# Patient Record
Sex: FEMALE | Race: WHITE | NOT HISPANIC OR LATINO
[De-identification: names, ages, dates, MRNs, and addresses within clinical notes are randomized per-mention and may not be internally consistent; named-entity substitution may affect disease eponyms.]

---

## 2017-02-10 ENCOUNTER — APPOINTMENT (OUTPATIENT)
Dept: PULMONOLOGY | Facility: CLINIC | Age: 77
End: 2017-02-10

## 2017-02-10 VITALS
DIASTOLIC BLOOD PRESSURE: 70 MMHG | HEART RATE: 52 BPM | SYSTOLIC BLOOD PRESSURE: 122 MMHG | WEIGHT: 207 LBS | HEIGHT: 59 IN | OXYGEN SATURATION: 94 % | BODY MASS INDEX: 41.73 KG/M2

## 2017-04-17 ENCOUNTER — APPOINTMENT (OUTPATIENT)
Dept: INTERNAL MEDICINE | Facility: CLINIC | Age: 77
End: 2017-04-17

## 2017-04-17 VITALS
RESPIRATION RATE: 14 BRPM | HEART RATE: 62 BPM | OXYGEN SATURATION: 95 % | SYSTOLIC BLOOD PRESSURE: 136 MMHG | DIASTOLIC BLOOD PRESSURE: 72 MMHG | WEIGHT: 212 LBS | TEMPERATURE: 98.2 F | HEIGHT: 59 IN | BODY MASS INDEX: 42.74 KG/M2

## 2017-04-17 DIAGNOSIS — Z83.1 FAMILY HISTORY OF OTHER INFECTIOUS AND PARASITIC DISEASES: ICD-10-CM

## 2017-04-17 DIAGNOSIS — Z82.0 FAMILY HISTORY OF EPILEPSY AND OTHER DISEASES OF THE NERVOUS SYSTEM: ICD-10-CM

## 2017-04-17 DIAGNOSIS — Z86.79 PERSONAL HISTORY OF OTHER DISEASES OF THE CIRCULATORY SYSTEM: ICD-10-CM

## 2017-04-17 DIAGNOSIS — Z87.891 PERSONAL HISTORY OF NICOTINE DEPENDENCE: ICD-10-CM

## 2017-04-18 LAB
25(OH)D3 SERPL-MCNC: 22.9 NG/ML
ALBUMIN SERPL ELPH-MCNC: 3.7 G/DL
ALP BLD-CCNC: 64 U/L
ALT SERPL-CCNC: 8 U/L
ANION GAP SERPL CALC-SCNC: 20 MMOL/L
AST SERPL-CCNC: 12 U/L
BILIRUB SERPL-MCNC: 0.4 MG/DL
BUN SERPL-MCNC: 15 MG/DL
CALCIUM SERPL-MCNC: 9.3 MG/DL
CHLORIDE SERPL-SCNC: 95 MMOL/L
CHOLEST SERPL-MCNC: 170 MG/DL
CHOLEST/HDLC SERPL: 2.9 RATIO
CO2 SERPL-SCNC: 25 MMOL/L
CREAT SERPL-MCNC: 0.69 MG/DL
FERRITIN SERPL-MCNC: 143.1 NG/ML
FOLATE SERPL-MCNC: 18.1 NG/ML
GLUCOSE SERPL-MCNC: 68 MG/DL
HBV SURFACE AG SER QL: NONREACTIVE
HCV AB SER QL: NONREACTIVE
HCV S/CO RATIO: 0.15 S/CO
HDLC SERPL-MCNC: 59 MG/DL
LDLC SERPL CALC-MCNC: 95 MG/DL
MAGNESIUM SERPL-MCNC: 2.1 MG/DL
POTASSIUM SERPL-SCNC: 4.4 MMOL/L
PROT SERPL-MCNC: 7.4 G/DL
SODIUM SERPL-SCNC: 140 MMOL/L
TRIGL SERPL-MCNC: 78 MG/DL
TSH SERPL-ACNC: 1.58 UIU/ML
URATE SERPL-MCNC: 4.2 MG/DL
VIT B12 SERPL-MCNC: 372 PG/ML

## 2017-04-19 LAB
APPEARANCE: ABNORMAL
BACTERIA: NEGATIVE
BILIRUBIN URINE: NEGATIVE
BLOOD URINE: NEGATIVE
CALCIUM OXALATE CRYSTALS: ABNORMAL
COLOR: YELLOW
CREAT SPEC-SCNC: 62 MG/DL
GLUCOSE QUALITATIVE U: NORMAL MG/DL
HBA1C MFR BLD HPLC: 5.7 %
HYALINE CASTS: 0 /LPF
KETONES URINE: NEGATIVE
LEUKOCYTE ESTERASE URINE: NEGATIVE
MICROALBUMIN 24H UR DL<=1MG/L-MCNC: 0.9 MG/DL
MICROALBUMIN/CREAT 24H UR-RTO: 14 UG/MG
MICROSCOPIC-UA: NORMAL
NITRITE URINE: NEGATIVE
PH URINE: 6
PROTEIN URINE: NEGATIVE MG/DL
RED BLOOD CELLS URINE: 1 /HPF
SPECIFIC GRAVITY URINE: 1.01
SQUAMOUS EPITHELIAL CELLS: 1 /HPF
UROBILINOGEN URINE: NORMAL MG/DL
WHITE BLOOD CELLS URINE: 3 /HPF

## 2017-04-20 LAB
BASOPHILS # BLD AUTO: 0.01 K/UL
BASOPHILS NFR BLD AUTO: 0.2 %
EOSINOPHIL # BLD AUTO: 0.13 K/UL
EOSINOPHIL NFR BLD AUTO: 2.5 %
HCT VFR BLD CALC: 33.5 %
HGB BLD-MCNC: 10.1 G/DL
IMM GRANULOCYTES NFR BLD AUTO: 0.2 %
LYMPHOCYTES # BLD AUTO: 1.29 K/UL
LYMPHOCYTES NFR BLD AUTO: 25.1 %
MAN DIFF?: NORMAL
MCHC RBC-ENTMCNC: 27.5 PG
MCHC RBC-ENTMCNC: 30.1 GM/DL
MCV RBC AUTO: 91.3 FL
MONOCYTES # BLD AUTO: 0.44 K/UL
MONOCYTES NFR BLD AUTO: 8.6 %
NEUTROPHILS # BLD AUTO: 3.25 K/UL
NEUTROPHILS NFR BLD AUTO: 63.4 %
PLATELET # BLD AUTO: 127 K/UL
RBC # BLD: 3.67 M/UL
RBC # FLD: 17.1 %
WBC # FLD AUTO: 5.13 K/UL

## 2017-05-08 ENCOUNTER — APPOINTMENT (OUTPATIENT)
Dept: PULMONOLOGY | Facility: CLINIC | Age: 77
End: 2017-05-08

## 2017-05-08 VITALS
OXYGEN SATURATION: 95 % | WEIGHT: 210 LBS | DIASTOLIC BLOOD PRESSURE: 68 MMHG | SYSTOLIC BLOOD PRESSURE: 134 MMHG | HEIGHT: 59 IN | BODY MASS INDEX: 42.33 KG/M2 | HEART RATE: 57 BPM | RESPIRATION RATE: 22 BRPM

## 2017-05-25 ENCOUNTER — OUTPATIENT (OUTPATIENT)
Dept: OUTPATIENT SERVICES | Facility: HOSPITAL | Age: 77
LOS: 1 days | Discharge: HOME | End: 2017-05-25

## 2017-05-25 DIAGNOSIS — D64.9 ANEMIA, UNSPECIFIED: ICD-10-CM

## 2017-05-25 DIAGNOSIS — Z98.89 OTHER SPECIFIED POSTPROCEDURAL STATES: Chronic | ICD-10-CM

## 2017-05-25 DIAGNOSIS — Z90.710 ACQUIRED ABSENCE OF BOTH CERVIX AND UTERUS: Chronic | ICD-10-CM

## 2017-05-30 ENCOUNTER — RESULT REVIEW (OUTPATIENT)
Age: 77
End: 2017-05-30

## 2017-05-30 ENCOUNTER — APPOINTMENT (OUTPATIENT)
Dept: HEMATOLOGY ONCOLOGY | Facility: CLINIC | Age: 77
End: 2017-05-30

## 2017-05-30 VITALS
OXYGEN SATURATION: 97 % | TEMPERATURE: 98 F | HEART RATE: 60 BPM | DIASTOLIC BLOOD PRESSURE: 94 MMHG | HEIGHT: 59.02 IN | SYSTOLIC BLOOD PRESSURE: 179 MMHG | RESPIRATION RATE: 16 BRPM | WEIGHT: 219.36 LBS | BODY MASS INDEX: 44.22 KG/M2

## 2017-05-30 DIAGNOSIS — D69.6 THROMBOCYTOPENIA, UNSPECIFIED: ICD-10-CM

## 2017-05-30 DIAGNOSIS — D64.9 ANEMIA, UNSPECIFIED: ICD-10-CM

## 2017-05-30 DIAGNOSIS — R31.9 HEMATURIA, UNSPECIFIED: ICD-10-CM

## 2017-05-30 DIAGNOSIS — R30.9 PAINFUL MICTURITION, UNSPECIFIED: ICD-10-CM

## 2017-05-30 LAB
APPEARANCE: CLEAR
BACTERIA: ABNORMAL
BASOPHILS # BLD AUTO: 0 K/UL — SIGNIFICANT CHANGE UP (ref 0–0.2)
BASOPHILS NFR BLD AUTO: 0.8 % — SIGNIFICANT CHANGE UP (ref 0–2)
BILIRUBIN URINE: NEGATIVE
BLOOD URINE: ABNORMAL
COLOR: YELLOW
EOSINOPHIL # BLD AUTO: 0.2 K/UL — SIGNIFICANT CHANGE UP (ref 0–0.5)
EOSINOPHIL NFR BLD AUTO: 2.9 % — SIGNIFICANT CHANGE UP (ref 0–6)
GLUCOSE QUALITATIVE U: NORMAL MG/DL
HCT VFR BLD CALC: 32.5 % — LOW (ref 34.5–45)
HGB BLD-MCNC: 10.9 G/DL — LOW (ref 11.5–15.5)
HYALINE CASTS: 5 /LPF
KETONES URINE: NEGATIVE
LEUKOCYTE ESTERASE URINE: ABNORMAL
LYMPHOCYTES # BLD AUTO: 0.9 K/UL — LOW (ref 1–3.3)
LYMPHOCYTES # BLD AUTO: 16.4 % — SIGNIFICANT CHANGE UP (ref 13–44)
MCHC RBC-ENTMCNC: 29.9 PG — SIGNIFICANT CHANGE UP (ref 27–34)
MCHC RBC-ENTMCNC: 33.4 G/DL — SIGNIFICANT CHANGE UP (ref 32–36)
MCV RBC AUTO: 89.5 FL — SIGNIFICANT CHANGE UP (ref 80–100)
MICROSCOPIC-UA: NORMAL
MONOCYTES # BLD AUTO: 0.6 K/UL — SIGNIFICANT CHANGE UP (ref 0–0.9)
MONOCYTES NFR BLD AUTO: 10.2 % — SIGNIFICANT CHANGE UP (ref 2–14)
NEUTROPHILS # BLD AUTO: 3.8 K/UL — SIGNIFICANT CHANGE UP (ref 1.8–7.4)
NEUTROPHILS NFR BLD AUTO: 69.8 % — SIGNIFICANT CHANGE UP (ref 43–77)
NITRITE URINE: NEGATIVE
PH URINE: 7
PLATELET # BLD AUTO: 113 K/UL — LOW (ref 150–400)
PROTEIN URINE: NEGATIVE MG/DL
RBC # BLD: 3.63 M/UL — LOW (ref 3.8–5.2)
RBC # FLD: 13.6 % — SIGNIFICANT CHANGE UP (ref 10.3–14.5)
RED BLOOD CELLS URINE: 2 /HPF
SPECIFIC GRAVITY URINE: 1.01
SQUAMOUS EPITHELIAL CELLS: 1 /HPF
UROBILINOGEN URINE: 1 MG/DL
WBC # BLD: 5.4 K/UL — SIGNIFICANT CHANGE UP (ref 3.8–10.5)
WBC # FLD AUTO: 5.4 K/UL — SIGNIFICANT CHANGE UP (ref 3.8–10.5)
WHITE BLOOD CELLS URINE: 62 /HPF

## 2017-06-01 LAB — BACTERIA UR CULT: ABNORMAL

## 2017-06-21 ENCOUNTER — OUTPATIENT (OUTPATIENT)
Dept: OUTPATIENT SERVICES | Facility: HOSPITAL | Age: 77
LOS: 1 days | Discharge: HOME | End: 2017-06-21

## 2017-06-21 DIAGNOSIS — C64.9 MALIGNANT NEOPLASM OF UNSPECIFIED KIDNEY, EXCEPT RENAL PELVIS: ICD-10-CM

## 2017-06-21 DIAGNOSIS — Z90.710 ACQUIRED ABSENCE OF BOTH CERVIX AND UTERUS: Chronic | ICD-10-CM

## 2017-06-21 DIAGNOSIS — Z98.89 OTHER SPECIFIED POSTPROCEDURAL STATES: Chronic | ICD-10-CM

## 2017-06-22 ENCOUNTER — APPOINTMENT (OUTPATIENT)
Dept: INTERNAL MEDICINE | Facility: CLINIC | Age: 77
End: 2017-06-22

## 2017-06-22 ENCOUNTER — LABORATORY RESULT (OUTPATIENT)
Age: 77
End: 2017-06-22

## 2017-06-22 VITALS
TEMPERATURE: 98.6 F | DIASTOLIC BLOOD PRESSURE: 76 MMHG | HEART RATE: 60 BPM | WEIGHT: 217 LBS | RESPIRATION RATE: 12 BRPM | SYSTOLIC BLOOD PRESSURE: 134 MMHG | BODY MASS INDEX: 43.75 KG/M2 | HEIGHT: 59 IN | OXYGEN SATURATION: 95 %

## 2017-06-22 DIAGNOSIS — S09.90XA UNSPECIFIED INJURY OF HEAD, INITIAL ENCOUNTER: ICD-10-CM

## 2017-06-22 DIAGNOSIS — R53.83 OTHER FATIGUE: ICD-10-CM

## 2017-06-22 DIAGNOSIS — H81.10 BENIGN PAROXYSMAL VERTIGO, UNSPECIFIED EAR: ICD-10-CM

## 2017-06-22 RX ORDER — SACUBITRIL AND VALSARTAN 24; 26 MG/1; MG/1
24-26 TABLET, FILM COATED ORAL
Refills: 0 | Status: DISCONTINUED | COMMUNITY
End: 2017-06-22

## 2017-06-23 LAB
25(OH)D3 SERPL-MCNC: 32.8 NG/ML
ANION GAP SERPL CALC-SCNC: 14 MMOL/L
BASOPHILS # BLD AUTO: 0.02 K/UL
BASOPHILS NFR BLD AUTO: 0.5 %
BUN SERPL-MCNC: 10 MG/DL
CALCIUM SERPL-MCNC: 9.6 MG/DL
CHLORIDE SERPL-SCNC: 81 MMOL/L
CO2 SERPL-SCNC: 33 MMOL/L
CREAT SERPL-MCNC: 0.7 MG/DL
EOSINOPHIL # BLD AUTO: 0.08 K/UL
EOSINOPHIL NFR BLD AUTO: 1.9 %
FERRITIN SERPL-MCNC: 187 NG/ML
FOLATE SERPL-MCNC: >20 NG/ML
GLUCOSE SERPL-MCNC: 82 MG/DL
HCT VFR BLD CALC: 32.4 %
HGB BLD-MCNC: 10.4 G/DL
IMM GRANULOCYTES NFR BLD AUTO: 0.5 %
LYMPHOCYTES # BLD AUTO: 0.72 K/UL
LYMPHOCYTES NFR BLD AUTO: 17.1 %
MAGNESIUM SERPL-MCNC: 2.1 MG/DL
MAN DIFF?: NORMAL
MCHC RBC-ENTMCNC: 28.5 PG
MCHC RBC-ENTMCNC: 32.1 GM/DL
MCV RBC AUTO: 88.8 FL
MONOCYTES # BLD AUTO: 0.35 K/UL
MONOCYTES NFR BLD AUTO: 8.3 %
NEUTROPHILS # BLD AUTO: 3.01 K/UL
NEUTROPHILS NFR BLD AUTO: 71.7 %
PLATELET # BLD AUTO: NORMAL
POTASSIUM SERPL-SCNC: 5.6 MMOL/L
RBC # BLD: 3.65 M/UL
RBC # FLD: 14.9 %
SODIUM SERPL-SCNC: 128 MMOL/L
TSH SERPL-ACNC: 1.62 UIU/ML
VIT B12 SERPL-MCNC: 484 PG/ML
WBC # FLD AUTO: 4.2 K/UL

## 2017-06-27 ENCOUNTER — APPOINTMENT (OUTPATIENT)
Dept: HEMATOLOGY ONCOLOGY | Facility: CLINIC | Age: 77
End: 2017-06-27

## 2017-07-10 ENCOUNTER — APPOINTMENT (OUTPATIENT)
Dept: PULMONOLOGY | Facility: CLINIC | Age: 77
End: 2017-07-10

## 2017-07-10 VITALS
HEART RATE: 59 BPM | HEIGHT: 59 IN | SYSTOLIC BLOOD PRESSURE: 148 MMHG | TEMPERATURE: 98.8 F | RESPIRATION RATE: 14 BRPM | OXYGEN SATURATION: 96 % | BODY MASS INDEX: 40.32 KG/M2 | DIASTOLIC BLOOD PRESSURE: 60 MMHG | WEIGHT: 200 LBS

## 2017-08-16 ENCOUNTER — LABORATORY RESULT (OUTPATIENT)
Age: 77
End: 2017-08-16

## 2017-08-16 ENCOUNTER — APPOINTMENT (OUTPATIENT)
Dept: INTERNAL MEDICINE | Facility: CLINIC | Age: 77
End: 2017-08-16
Payer: MEDICARE

## 2017-08-16 VITALS
HEIGHT: 59 IN | HEART RATE: 54 BPM | WEIGHT: 204 LBS | RESPIRATION RATE: 13 BRPM | DIASTOLIC BLOOD PRESSURE: 67 MMHG | SYSTOLIC BLOOD PRESSURE: 122 MMHG | TEMPERATURE: 98.1 F | BODY MASS INDEX: 41.12 KG/M2

## 2017-08-16 DIAGNOSIS — I25.10 ATHEROSCLEROTIC HEART DISEASE OF NATIVE CORONARY ARTERY W/OUT ANGINA PECTORIS: ICD-10-CM

## 2017-08-16 DIAGNOSIS — E87.1 HYPO-OSMOLALITY AND HYPONATREMIA: ICD-10-CM

## 2017-08-16 PROCEDURE — 99214 OFFICE O/P EST MOD 30 MIN: CPT

## 2017-08-16 RX ORDER — MECLIZINE HYDROCHLORIDE 12.5 MG/1
12.5 TABLET ORAL
Qty: 15 | Refills: 3 | Status: DISCONTINUED | COMMUNITY
Start: 2017-06-22 | End: 2017-08-16

## 2017-08-17 LAB
ANION GAP SERPL CALC-SCNC: 13 MMOL/L
BASOPHILS # BLD AUTO: 0.05 K/UL
BASOPHILS NFR BLD AUTO: 1 %
BUN SERPL-MCNC: 16 MG/DL
CALCIUM SERPL-MCNC: 9.1 MG/DL
CHLORIDE SERPL-SCNC: 91 MMOL/L
CO2 SERPL-SCNC: 33 MMOL/L
CREAT SERPL-MCNC: 0.92 MG/DL
EOSINOPHIL # BLD AUTO: 0.11 K/UL
EOSINOPHIL NFR BLD AUTO: 2.3 %
FERRITIN SERPL-MCNC: 246 NG/ML
GLUCOSE SERPL-MCNC: 72 MG/DL
HCT VFR BLD CALC: 31.6 %
HGB BLD-MCNC: 9.6 G/DL
IMM GRANULOCYTES NFR BLD AUTO: 0.2 %
LYMPHOCYTES # BLD AUTO: 0.92 K/UL
LYMPHOCYTES NFR BLD AUTO: 19.1 %
MAGNESIUM SERPL-MCNC: 2.2 MG/DL
MAN DIFF?: NORMAL
MCHC RBC-ENTMCNC: 26.9 PG
MCHC RBC-ENTMCNC: 30.4 GM/DL
MCV RBC AUTO: 88.5 FL
MONOCYTES # BLD AUTO: 0.47 K/UL
MONOCYTES NFR BLD AUTO: 9.8 %
NEUTROPHILS # BLD AUTO: 3.25 K/UL
NEUTROPHILS NFR BLD AUTO: 67.6 %
PLATELET # BLD AUTO: 135 K/UL
POTASSIUM SERPL-SCNC: 5.2 MMOL/L
RBC # BLD: 3.57 M/UL
RBC # FLD: 15.3 %
SODIUM SERPL-SCNC: 137 MMOL/L
WBC # FLD AUTO: 4.81 K/UL

## 2017-09-15 ENCOUNTER — RX RENEWAL (OUTPATIENT)
Age: 77
End: 2017-09-15

## 2017-09-20 ENCOUNTER — APPOINTMENT (OUTPATIENT)
Dept: INTERNAL MEDICINE | Facility: CLINIC | Age: 77
End: 2017-09-20

## 2017-10-30 ENCOUNTER — APPOINTMENT (OUTPATIENT)
Dept: PULMONOLOGY | Facility: CLINIC | Age: 77
End: 2017-10-30
Payer: MEDICARE

## 2017-10-30 VITALS
OXYGEN SATURATION: 97 % | WEIGHT: 191 LBS | HEART RATE: 51 BPM | SYSTOLIC BLOOD PRESSURE: 170 MMHG | RESPIRATION RATE: 14 BRPM | TEMPERATURE: 98.2 F | DIASTOLIC BLOOD PRESSURE: 66 MMHG | BODY MASS INDEX: 38.58 KG/M2

## 2017-10-30 PROCEDURE — 99214 OFFICE O/P EST MOD 30 MIN: CPT

## 2017-12-18 ENCOUNTER — APPOINTMENT (OUTPATIENT)
Dept: INTERNAL MEDICINE | Facility: CLINIC | Age: 77
End: 2017-12-18

## 2017-12-18 ENCOUNTER — APPOINTMENT (OUTPATIENT)
Dept: INTERNAL MEDICINE | Facility: CLINIC | Age: 77
End: 2017-12-18
Payer: MEDICARE

## 2017-12-18 PROCEDURE — G0008: CPT

## 2017-12-18 PROCEDURE — 90686 IIV4 VACC NO PRSV 0.5 ML IM: CPT

## 2017-12-19 ENCOUNTER — MED ADMIN CHARGE (OUTPATIENT)
Age: 77
End: 2017-12-19

## 2018-01-16 ENCOUNTER — APPOINTMENT (OUTPATIENT)
Dept: INTERNAL MEDICINE | Facility: CLINIC | Age: 78
End: 2018-01-16

## 2018-02-12 ENCOUNTER — APPOINTMENT (OUTPATIENT)
Dept: PULMONOLOGY | Facility: CLINIC | Age: 78
End: 2018-02-12
Payer: MEDICARE

## 2018-02-12 VITALS
OXYGEN SATURATION: 99 % | DIASTOLIC BLOOD PRESSURE: 70 MMHG | SYSTOLIC BLOOD PRESSURE: 148 MMHG | TEMPERATURE: 97.9 F | HEART RATE: 61 BPM | RESPIRATION RATE: 16 BRPM

## 2018-02-12 PROCEDURE — 99215 OFFICE O/P EST HI 40 MIN: CPT

## 2018-03-05 ENCOUNTER — APPOINTMENT (OUTPATIENT)
Dept: INTERNAL MEDICINE | Facility: CLINIC | Age: 78
End: 2018-03-05

## 2018-03-06 ENCOUNTER — APPOINTMENT (OUTPATIENT)
Dept: WOUND CARE | Facility: CLINIC | Age: 78
End: 2018-03-06
Payer: MEDICARE

## 2018-03-06 PROCEDURE — 11042 DBRDMT SUBQ TIS 1ST 20SQCM/<: CPT

## 2018-03-13 ENCOUNTER — APPOINTMENT (OUTPATIENT)
Dept: WOUND CARE | Facility: CLINIC | Age: 78
End: 2018-03-13
Payer: MEDICARE

## 2018-03-13 PROCEDURE — 11042 DBRDMT SUBQ TIS 1ST 20SQCM/<: CPT

## 2018-03-14 RX ORDER — MUPIROCIN 20 MG/G
2 OINTMENT TOPICAL
Qty: 60 | Refills: 3 | Status: COMPLETED | COMMUNITY
Start: 2018-03-14

## 2018-03-23 ENCOUNTER — APPOINTMENT (OUTPATIENT)
Dept: WOUND CARE | Facility: CLINIC | Age: 78
End: 2018-03-23
Payer: MEDICARE

## 2018-03-23 PROCEDURE — 11042 DBRDMT SUBQ TIS 1ST 20SQCM/<: CPT

## 2018-03-31 ENCOUNTER — INPATIENT (INPATIENT)
Facility: HOSPITAL | Age: 78
LOS: 11 days | Discharge: HOME | DRG: 189 | End: 2018-04-12
Attending: HOSPITALIST | Admitting: HOSPITALIST
Payer: MEDICARE

## 2018-03-31 VITALS
RESPIRATION RATE: 19 BRPM | WEIGHT: 199.96 LBS | HEART RATE: 71 BPM | DIASTOLIC BLOOD PRESSURE: 73 MMHG | OXYGEN SATURATION: 97 % | SYSTOLIC BLOOD PRESSURE: 166 MMHG

## 2018-03-31 DIAGNOSIS — E87.2 ACIDOSIS: ICD-10-CM

## 2018-03-31 DIAGNOSIS — J96.21 ACUTE AND CHRONIC RESPIRATORY FAILURE WITH HYPOXIA: ICD-10-CM

## 2018-03-31 DIAGNOSIS — R53.1 WEAKNESS: ICD-10-CM

## 2018-03-31 DIAGNOSIS — Z98.89 OTHER SPECIFIED POSTPROCEDURAL STATES: Chronic | ICD-10-CM

## 2018-03-31 DIAGNOSIS — I25.118 ATHEROSCLEROTIC HEART DISEASE OF NATIVE CORONARY ARTERY WITH OTHER FORMS OF ANGINA PECTORIS: ICD-10-CM

## 2018-03-31 DIAGNOSIS — I50.22 CHRONIC SYSTOLIC (CONGESTIVE) HEART FAILURE: ICD-10-CM

## 2018-03-31 DIAGNOSIS — E87.1 HYPO-OSMOLALITY AND HYPONATREMIA: ICD-10-CM

## 2018-03-31 DIAGNOSIS — Z90.710 ACQUIRED ABSENCE OF BOTH CERVIX AND UTERUS: Chronic | ICD-10-CM

## 2018-03-31 DIAGNOSIS — I48.91 UNSPECIFIED ATRIAL FIBRILLATION: ICD-10-CM

## 2018-03-31 DIAGNOSIS — D64.9 ANEMIA, UNSPECIFIED: ICD-10-CM

## 2018-03-31 DIAGNOSIS — D69.6 THROMBOCYTOPENIA, UNSPECIFIED: ICD-10-CM

## 2018-03-31 DIAGNOSIS — J84.10 PULMONARY FIBROSIS, UNSPECIFIED: ICD-10-CM

## 2018-03-31 LAB
ALBUMIN SERPL ELPH-MCNC: 3.9 G/DL — SIGNIFICANT CHANGE UP (ref 3.3–5)
ALP SERPL-CCNC: 61 U/L — SIGNIFICANT CHANGE UP (ref 40–120)
ALT FLD-CCNC: 10 U/L RC — SIGNIFICANT CHANGE UP (ref 10–45)
ANION GAP SERPL CALC-SCNC: 6 MMOL/L — SIGNIFICANT CHANGE UP (ref 5–17)
ANION GAP SERPL CALC-SCNC: 6 MMOL/L — SIGNIFICANT CHANGE UP (ref 5–17)
APPEARANCE UR: CLEAR — SIGNIFICANT CHANGE UP
APTT BLD: 29.3 SEC — SIGNIFICANT CHANGE UP (ref 27.5–37.4)
AST SERPL-CCNC: 30 U/L — SIGNIFICANT CHANGE UP (ref 10–40)
BASE EXCESS BLDV CALC-SCNC: 15.4 MMOL/L — HIGH (ref -2–2)
BASE EXCESS BLDV CALC-SCNC: 15.7 MMOL/L — HIGH (ref -2–2)
BASOPHILS # BLD AUTO: 0 K/UL — SIGNIFICANT CHANGE UP (ref 0–0.2)
BASOPHILS NFR BLD AUTO: 0.3 % — SIGNIFICANT CHANGE UP (ref 0–2)
BILIRUB SERPL-MCNC: 0.6 MG/DL — SIGNIFICANT CHANGE UP (ref 0.2–1.2)
BILIRUB UR-MCNC: NEGATIVE — SIGNIFICANT CHANGE UP
BUN SERPL-MCNC: 12 MG/DL — SIGNIFICANT CHANGE UP (ref 7–23)
BUN SERPL-MCNC: 12 MG/DL — SIGNIFICANT CHANGE UP (ref 7–23)
CA-I SERPL-SCNC: 1.14 MMOL/L — SIGNIFICANT CHANGE UP (ref 1.12–1.3)
CA-I SERPL-SCNC: 1.16 MMOL/L — SIGNIFICANT CHANGE UP (ref 1.12–1.3)
CALCIUM SERPL-MCNC: 9.4 MG/DL — SIGNIFICANT CHANGE UP (ref 8.4–10.5)
CALCIUM SERPL-MCNC: 9.4 MG/DL — SIGNIFICANT CHANGE UP (ref 8.4–10.5)
CHLORIDE BLDV-SCNC: 83 MMOL/L — LOW (ref 96–108)
CHLORIDE BLDV-SCNC: 84 MMOL/L — LOW (ref 96–108)
CHLORIDE SERPL-SCNC: 85 MMOL/L — LOW (ref 96–108)
CHLORIDE SERPL-SCNC: 86 MMOL/L — LOW (ref 96–108)
CK MB BLD-MCNC: 2.1 % — SIGNIFICANT CHANGE UP (ref 0–3.5)
CK MB CFR SERPL CALC: 1.5 NG/ML — SIGNIFICANT CHANGE UP (ref 0–3.8)
CK SERPL-CCNC: 72 U/L — SIGNIFICANT CHANGE UP (ref 25–170)
CO2 BLDV-SCNC: 48 MMOL/L — HIGH (ref 22–30)
CO2 BLDV-SCNC: 48 MMOL/L — HIGH (ref 22–30)
CO2 SERPL-SCNC: 38 MMOL/L — HIGH (ref 22–31)
CO2 SERPL-SCNC: 41 MMOL/L — HIGH (ref 22–31)
COLOR SPEC: ABNORMAL
CREAT SERPL-MCNC: 0.63 MG/DL — SIGNIFICANT CHANGE UP (ref 0.5–1.3)
CREAT SERPL-MCNC: 0.67 MG/DL — SIGNIFICANT CHANGE UP (ref 0.5–1.3)
DIFF PNL FLD: NEGATIVE — SIGNIFICANT CHANGE UP
EOSINOPHIL # BLD AUTO: 0.1 K/UL — SIGNIFICANT CHANGE UP (ref 0–0.5)
EOSINOPHIL NFR BLD AUTO: 1.3 % — SIGNIFICANT CHANGE UP (ref 0–6)
EPI CELLS # UR: SIGNIFICANT CHANGE UP /HPF
GAS PNL BLDA: SIGNIFICANT CHANGE UP
GAS PNL BLDV: 124 MMOL/L — LOW (ref 136–145)
GAS PNL BLDV: 126 MMOL/L — LOW (ref 136–145)
GAS PNL BLDV: SIGNIFICANT CHANGE UP
GLUCOSE BLDV-MCNC: 92 MG/DL — SIGNIFICANT CHANGE UP (ref 70–99)
GLUCOSE BLDV-MCNC: 97 MG/DL — SIGNIFICANT CHANGE UP (ref 70–99)
GLUCOSE SERPL-MCNC: 100 MG/DL — HIGH (ref 70–99)
GLUCOSE SERPL-MCNC: 93 MG/DL — SIGNIFICANT CHANGE UP (ref 70–99)
GLUCOSE UR QL: NEGATIVE — SIGNIFICANT CHANGE UP
HCO3 BLDV-SCNC: 45 MMOL/L — HIGH (ref 21–29)
HCO3 BLDV-SCNC: 45 MMOL/L — HIGH (ref 21–29)
HCT VFR BLD CALC: 32.9 % — LOW (ref 34.5–45)
HCT VFR BLDA CALC: 30 % — LOW (ref 39–50)
HCT VFR BLDA CALC: 30 % — LOW (ref 39–50)
HGB BLD CALC-MCNC: 9.7 G/DL — LOW (ref 11.5–15.5)
HGB BLD CALC-MCNC: 9.9 G/DL — LOW (ref 11.5–15.5)
HGB BLD-MCNC: 10.5 G/DL — LOW (ref 11.5–15.5)
HOROWITZ INDEX BLDV+IHG-RTO: SIGNIFICANT CHANGE UP
HYALINE CASTS # UR AUTO: ABNORMAL
INR BLD: 1.37 RATIO — HIGH (ref 0.88–1.16)
KETONES UR-MCNC: NEGATIVE — SIGNIFICANT CHANGE UP
LACTATE BLDV-MCNC: 1.1 MMOL/L — SIGNIFICANT CHANGE UP (ref 0.7–2)
LACTATE BLDV-MCNC: 1.2 MMOL/L — SIGNIFICANT CHANGE UP (ref 0.7–2)
LEUKOCYTE ESTERASE UR-ACNC: NEGATIVE — SIGNIFICANT CHANGE UP
LYMPHOCYTES # BLD AUTO: 0.8 K/UL — LOW (ref 1–3.3)
LYMPHOCYTES # BLD AUTO: 19.1 % — SIGNIFICANT CHANGE UP (ref 13–44)
MCHC RBC-ENTMCNC: 29.3 PG — SIGNIFICANT CHANGE UP (ref 27–34)
MCHC RBC-ENTMCNC: 32.1 GM/DL — SIGNIFICANT CHANGE UP (ref 32–36)
MCV RBC AUTO: 91.3 FL — SIGNIFICANT CHANGE UP (ref 80–100)
MONOCYTES # BLD AUTO: 0.3 K/UL — SIGNIFICANT CHANGE UP (ref 0–0.9)
MONOCYTES NFR BLD AUTO: 7.7 % — SIGNIFICANT CHANGE UP (ref 2–14)
NEUTROPHILS # BLD AUTO: 2.8 K/UL — SIGNIFICANT CHANGE UP (ref 1.8–7.4)
NEUTROPHILS NFR BLD AUTO: 71.6 % — SIGNIFICANT CHANGE UP (ref 43–77)
NITRITE UR-MCNC: NEGATIVE — SIGNIFICANT CHANGE UP
NT-PROBNP SERPL-SCNC: 1948 PG/ML — HIGH (ref 0–300)
PCO2 BLDV: 98 MMHG — HIGH (ref 35–50)
PCO2 BLDV: 98 MMHG — HIGH (ref 35–50)
PH BLDV: 7.28 — LOW (ref 7.35–7.45)
PH BLDV: 7.29 — LOW (ref 7.35–7.45)
PH UR: 6.5 — SIGNIFICANT CHANGE UP (ref 5–8)
PLAT MORPH BLD: NORMAL — SIGNIFICANT CHANGE UP
PLATELET # BLD AUTO: 98 K/UL — LOW (ref 150–400)
PO2 BLDV: 40 MMHG — SIGNIFICANT CHANGE UP (ref 25–45)
PO2 BLDV: 40 MMHG — SIGNIFICANT CHANGE UP (ref 25–45)
POTASSIUM BLDV-SCNC: 5.1 MMOL/L — HIGH (ref 3.5–5)
POTASSIUM BLDV-SCNC: 6.3 MMOL/L — CRITICAL HIGH (ref 3.5–5)
POTASSIUM SERPL-MCNC: 5.2 MMOL/L — SIGNIFICANT CHANGE UP (ref 3.5–5.3)
POTASSIUM SERPL-MCNC: 5.8 MMOL/L — HIGH (ref 3.5–5.3)
POTASSIUM SERPL-SCNC: 5.2 MMOL/L — SIGNIFICANT CHANGE UP (ref 3.5–5.3)
POTASSIUM SERPL-SCNC: 5.8 MMOL/L — HIGH (ref 3.5–5.3)
PROT SERPL-MCNC: 7.6 G/DL — SIGNIFICANT CHANGE UP (ref 6–8.3)
PROT UR-MCNC: 30 MG/DL
PROTHROM AB SERPL-ACNC: 14.9 SEC — HIGH (ref 9.8–12.7)
RBC # BLD: 3.6 M/UL — LOW (ref 3.8–5.2)
RBC # FLD: 13.8 % — SIGNIFICANT CHANGE UP (ref 10.3–14.5)
RBC BLD AUTO: SIGNIFICANT CHANGE UP
RBC CASTS # UR COMP ASSIST: SIGNIFICANT CHANGE UP /HPF (ref 0–2)
SAO2 % BLDV: 70 % — SIGNIFICANT CHANGE UP (ref 67–88)
SAO2 % BLDV: 70 % — SIGNIFICANT CHANGE UP (ref 67–88)
SODIUM SERPL-SCNC: 129 MMOL/L — LOW (ref 135–145)
SODIUM SERPL-SCNC: 133 MMOL/L — LOW (ref 135–145)
SP GR SPEC: 1.02 — SIGNIFICANT CHANGE UP (ref 1.01–1.02)
TROPONIN T SERPL-MCNC: <0.01 NG/ML — SIGNIFICANT CHANGE UP (ref 0–0.06)
UROBILINOGEN FLD QL: 2 MG/DL
WBC # BLD: 4 K/UL — SIGNIFICANT CHANGE UP (ref 3.8–10.5)
WBC # FLD AUTO: 4 K/UL — SIGNIFICANT CHANGE UP (ref 3.8–10.5)
WBC UR QL: SIGNIFICANT CHANGE UP /HPF (ref 0–5)

## 2018-03-31 PROCEDURE — 99285 EMERGENCY DEPT VISIT HI MDM: CPT | Mod: 25,GC

## 2018-03-31 PROCEDURE — 93010 ELECTROCARDIOGRAM REPORT: CPT

## 2018-03-31 PROCEDURE — 99223 1ST HOSP IP/OBS HIGH 75: CPT

## 2018-03-31 PROCEDURE — 70450 CT HEAD/BRAIN W/O DYE: CPT | Mod: 26

## 2018-03-31 PROCEDURE — 71045 X-RAY EXAM CHEST 1 VIEW: CPT | Mod: 26

## 2018-03-31 RX ORDER — LOSARTAN POTASSIUM 100 MG/1
100 TABLET, FILM COATED ORAL DAILY
Qty: 0 | Refills: 0 | Status: DISCONTINUED | OUTPATIENT
Start: 2018-03-31 | End: 2018-04-12

## 2018-03-31 RX ORDER — RANOLAZINE 500 MG/1
500 TABLET, FILM COATED, EXTENDED RELEASE ORAL
Qty: 0 | Refills: 0 | Status: DISCONTINUED | OUTPATIENT
Start: 2018-03-31 | End: 2018-04-12

## 2018-03-31 RX ORDER — CARVEDILOL PHOSPHATE 80 MG/1
12.5 CAPSULE, EXTENDED RELEASE ORAL EVERY 12 HOURS
Qty: 0 | Refills: 0 | Status: DISCONTINUED | OUTPATIENT
Start: 2018-03-31 | End: 2018-04-12

## 2018-03-31 RX ORDER — SODIUM CHLORIDE 9 MG/ML
3 INJECTION INTRAMUSCULAR; INTRAVENOUS; SUBCUTANEOUS ONCE
Qty: 0 | Refills: 0 | Status: COMPLETED | OUTPATIENT
Start: 2018-03-31 | End: 2018-03-31

## 2018-03-31 RX ORDER — SODIUM CHLORIDE 9 MG/ML
500 INJECTION INTRAMUSCULAR; INTRAVENOUS; SUBCUTANEOUS
Qty: 0 | Refills: 0 | Status: DISCONTINUED | OUTPATIENT
Start: 2018-03-31 | End: 2018-04-01

## 2018-03-31 RX ORDER — APIXABAN 2.5 MG/1
2.5 TABLET, FILM COATED ORAL EVERY 12 HOURS
Qty: 0 | Refills: 0 | Status: DISCONTINUED | OUTPATIENT
Start: 2018-03-31 | End: 2018-04-12

## 2018-03-31 RX ADMIN — SODIUM CHLORIDE 3 MILLILITER(S): 9 INJECTION INTRAMUSCULAR; INTRAVENOUS; SUBCUTANEOUS at 10:10

## 2018-03-31 RX ADMIN — APIXABAN 2.5 MILLIGRAM(S): 2.5 TABLET, FILM COATED ORAL at 18:17

## 2018-03-31 RX ADMIN — SODIUM CHLORIDE 75 MILLILITER(S): 9 INJECTION INTRAMUSCULAR; INTRAVENOUS; SUBCUTANEOUS at 18:19

## 2018-03-31 RX ADMIN — SODIUM CHLORIDE 75 MILLILITER(S): 9 INJECTION INTRAMUSCULAR; INTRAVENOUS; SUBCUTANEOUS at 14:07

## 2018-03-31 RX ADMIN — CARVEDILOL PHOSPHATE 12.5 MILLIGRAM(S): 80 CAPSULE, EXTENDED RELEASE ORAL at 18:17

## 2018-03-31 RX ADMIN — RANOLAZINE 500 MILLIGRAM(S): 500 TABLET, FILM COATED, EXTENDED RELEASE ORAL at 18:17

## 2018-03-31 NOTE — H&P ADULT - NSHPLABSRESULTS_GEN_ALL_CORE
Labs and imaging data personally reviewed. Pertinent findings include:   - normocytic anemia w/ H&H = 10.5/32.9  - thrombocytopenia = 98  - hyponatremia = 129 --> 133  - hyperkalemia = 5.8 --> 5.2  - serum Cl = 85 --> 86  - serum bicarb = 38 --> 41  - mildly elevated coag panel as pt on NOAC  - WNL LFTs  - trops < 0.01  - pBNP = 1948  - VBG: pH = 7.29, pCO2 = 98, bicarb = 45  - UA/micro bland  - CXR 2 Views as reported: Small left pleural effusion with adjacent airspace disease; superimposed pneumonia not excluded. Mild right basilar opacities.  - CT Head w/o contrast as reported: No acute intracranial hemorrhage, mass effect, or evidence of acute territorial infarct. Foci of decreased attenuation throughout the cerebral white matter are nonspecific. Statistically, moderate to severe white matter microvascular ischemic disease is favored. 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

## 2018-03-31 NOTE — ED PROVIDER NOTE - OBJECTIVE STATEMENT
77F with PMH of CHF EF20% s/p AICD, afib on Eliquis, pulmonary fibrosis presenting with weakness 77F with PMH of CHF EF19% s/p AICD, afib on Eliquis, pulmonary fibrosis on 2.5L O2 at home presenting with weakness. States that she could not lift herself up this morning and slid to the floor. Endorses recent visit to cardiologist 2 weeks ago for routine follow up. Takes lasix 80mg at home for her CHF and may have taken extra doses for her edema. Denies fever, chills, cp, sob, n/v/d, dizziness, headache, dysuria 77F with PMH of CHF EF19% s/p AICD, afib on Eliquis, pulmonary fibrosis on 2.5L O2 at home presenting with weakness. States that she could not lift herself up this morning and slid to the floor. Endorses recent visit to cardiologist 2 weeks ago for routine follow up. Takes lasix 80mg at home for her CHF with no recent changes to dose. Denies fever, chills, cp, sob, n/v/d, dizziness, headache, dysuria

## 2018-03-31 NOTE — H&P ADULT - NEGATIVE NEUROLOGICAL SYMPTOMS
no transient paralysis/no loss of sensation/no difficulty walking/no confusion/no syncope/no vertigo/no loss of consciousness/no hemiparesis

## 2018-03-31 NOTE — ED PROVIDER NOTE - MEDICAL DECISION MAKING DETAILS
77F presenting with weakness, likely 2/2 over diuresis and deconditioning. No complaints of cp or worse sob from baseline, to suggest acute decompensation of CHF with lungs CTAB. No fever, or associated symptoms to suggest infectious etiology. No CP or ekg changes to suggest ACS. Will order cbc, cmp, bnp, vbg, trop, cxr, ua, uc and reassess

## 2018-03-31 NOTE — H&P ADULT - PROBLEM SELECTOR PROBLEM 6
Chronic systolic heart failure Coronary artery disease of native artery of native heart with stable angina pectoris Thrombocytopenia

## 2018-03-31 NOTE — ED PROVIDER NOTE - PMH
AICD (automatic cardioverter/defibrillator) present    Atrial fibrillation    CHF (congestive heart failure)    Pulmonary fibrosis    Stented coronary artery

## 2018-03-31 NOTE — H&P ADULT - PROBLEM SELECTOR PLAN 1
- unclear etiology, no focal neurologic deficits, CT Head unremarkable  - ?deconditioning in the setting of recent LLE ulcer management and decreased activity level vs. d/t aberrations explained below  - pt states she is feeling better than she did when she arrived  - PT consult - unclear etiology, no focal neurologic deficits, CT Head unremarkable  - will interrogate AICD by cardiology fellow  - ?deconditioning in the setting of recent LLE ulcer management and decreased activity level vs. d/t aberrations explained below  - pt states she is feeling better than she did when she arrived  - PT consult - unclear etiology, no focal neurologic deficits, CT Head unremarkable, ECG w/ rate controlled Afib  - will interrogate AICD by cardiology fellow  - ?deconditioning in the setting of recent LLE ulcer management and decreased activity level vs. d/t aberrations explained below  - pt states she is feeling better than she did when she arrived  - PT consult

## 2018-03-31 NOTE — ED ADULT NURSE NOTE - OBJECTIVE STATEMENT
Came in with c/o weakness for 2 weeks. s/p cellulitis of lower extremities treatment. Pt's lower extremities discolored and swollen. Daughter at bedside. Pt with home oxygen. Dyspnea on exertion noted. Pt anxious. Emotional support offered. Skin warm and dry to touch. No chills. No diaphoresis.

## 2018-03-31 NOTE — ED PROVIDER NOTE - GENITOURINARY NEGATIVE STATEMENT, MLM
Clinical:  Shortness of breath.

 

Comparison:  06/09/2016.

 

Findings:

There is a moderate left pleural effusion with left basilar and left upper

lobe/lingular atelectasis.  Cardiomegaly with mild pulmonary venous congestion is

appreciated.  No pericardial effusion.  No pneumothorax.  Tracheobronchial tree

is patent.  Limited evaluation of the upper abdomen demonstrates ascites.

Surrounding musculoskeletal structures demonstrate age-related degenerative

changes without focal osseous abnormality.

 

Impression:

Moderate left effusion with left upper lobe/lingular and basilar atelectasis.

Cardiomegaly and pulmonary venous congestion.

Upper abdominal ascites.

 

 

Signed by

Cameron Chang MD 11/27/2016 02:11 P no dysuria, no frequency, and no hematuria.

## 2018-03-31 NOTE — H&P ADULT - PROBLEM SELECTOR PLAN 9
- rate controlled  - will continue anticoagulation w/ Eliquis 2.5mg BID (as per pt, she tends to bleed, and was then placed on reduced dosing, despite no restrictions based on age/weight/renal function)  - continue beta blocker as above

## 2018-03-31 NOTE — H&P ADULT - NEGATIVE ENMT SYMPTOMS
no vertigo/no throat pain/no hearing difficulty/no sinus symptoms/no nasal discharge/no nasal congestion

## 2018-03-31 NOTE — ED PROVIDER NOTE - ATTENDING CONTRIBUTION TO CARE
****ATTENDING**** 76yo f hx listed BIB daughter for increasing progressive weakness. States she feels that she does not have any energy and today couldn't lift her self up. Denies any recent illness. Compliant w meds. Decreased appetite and diet. Pt treated for L foot ulcer completed augmented 10 d ago and healing.   On exam, Patient is awake,alert,oriented x 3. Patient is well appearing and in no acute distress. Patient's chest decreased BS at bases.+s1s2. Abdomen is soft nd/nt +BS. Extremity with 2+ edema b/l. L foot dorsum, healing wound. Pt moving all 4 extremities.  Check labs, UA, CT head and xray. Possibly dehydrated to diuresis, ro acs, infection.

## 2018-03-31 NOTE — H&P ADULT - PROBLEM SELECTOR PLAN 2
- on home O2 - 2.5L (as per pt, she notices feeling dizzy when her SpO2 = 100%)  - as per family, she is a chronic CO2 retainer, with her levels usually in the 50s  - as per family, she was instructed to use NIV (unclear if CPAP or BiPAP, pt never had a sleep study), prescribed by her pulmonologist, Dr. Arciniega, but she is very noncompliant as the machine exacerbates her dry mouth and she finds it intolerable  - will continue O2 supplementation via NC, and will contact pulmonologist re: NIV settings, etc before initiation, and have them see the pt during hospitalization - on home O2 - 2.5L (as per pt, she notices feeling dizzy when her SpO2 = 100%)  - as per family, she is a chronic CO2 retainer, with her levels usually in the 50s  - as per family, she was instructed to use NIV (unclear if CPAP or BiPAP, pt never had a sleep study), prescribed by her pulmonologist, Dr. Arciniega, but she is very noncompliant as the machine exacerbates her dry mouth and she finds it intolerable  - will continue O2 supplementation via NC, and will contact pulmonologist re: NIV settings, etc before initiation, and have them see the pt during hospitalization    ADDENDUM: Spoke with on-call pulmonologist, at [phone], the partner w/ Dr. 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 - on home O2 - 2.5L (as per pt, she notices feeling dizzy when her SpO2 = 100%)  - as per family, she is a chronic CO2 retainer, with her levels usually in the 50s  - as per family, she was instructed to use NIV (unclear if CPAP or BiPAP, pt never had a sleep study), prescribed by her pulmonologist, Dr. Arciniega, but she is very noncompliant as the machine exacerbates her dry mouth and she finds it intolerable  - will initiate BiPAP with serial ABG monitoring to assess for improvement of hypercarbia; will contact pulmonologist re: NIV settings, etc before initiation, and have them see the pt during hospitalization    ADDENDUM: Spoke with on-call pulmonologist, at [phone], the partner w/ Dr. 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 Patient and daughter in agreement with this plan as well.

## 2018-03-31 NOTE — H&P ADULT - PROBLEM SELECTOR PLAN 3
- etiology could be related to chronic lung disease vs. excessive supplemental O2 administration in chronic hypercarbic respiratory failure  - will obtain collateral from pulmonologist  - will obtain ABG and monitor closely

## 2018-03-31 NOTE — H&P ADULT - HISTORY OF PRESENT ILLNESS
77yoF w/ PMHx significant for HFrEF (LVEF = 10%) s/p AICD, CAD s/p stents w/ stable angina (pt on beta blocker and Ranolazine), Afib (on reduced dose Eliquis 2/2 "propensity for bleeding" as per family), pulmonary fibrosis on home O2 (2.5L via NC; and intermittent NIV, unclear whether CPAP vs. BiPAP, though pt noncompliant), AAA s/p repair, who presents from home where she lives with her , w/ complaints of generalized weakness. As per the pt, she woke up early in the AM to use the bathroom but was unable to lift herself up, and instead slid off of her bed and onto the floor. When she woke up later, she again, was feeling weak, and called her son-in-law, who recommended presenting to the ED. Patient denies associated dizziness/vertigo, LOC, falling/hitting her head, CP, palpitations, or focal neurologic deficits. She also denies recent illness/sick contacts, fevers/chills, cough, ZAMAN, abd pain/n/v/d, or have urinary complaints, though she does endorse dry mouth, chronic post nasal drip, and recent constipation relieved w/ bowel regimen w/ last BM yesterday. Of note, pt is under current treatment w/ regular wound care and s/p 10d course of Augmentin for LLE cellulitis/ulceration.     ED Presenting Vitals: 97F, 71bpm, 166/73, 19br/m, 95% on O2 supp  ED Course: labs, CXR 2 views, and CT Head performed; s/p 500cc NS at 75cc/hr 77yoF w/ PMHx significant for HFrEF (LVEF = 19%) s/p AICD, CAD s/p stents w/ stable angina (pt on beta blocker and Ranolazine), Afib (on reduced dose Eliquis 2/2 "propensity for bleeding" as per family), pulmonary fibrosis on home O2 (2.5L via NC; and intermittent NIV, unclear whether CPAP vs. BiPAP, though pt noncompliant), AAA s/p repair, who presents from home where she lives with her , w/ complaints of generalized weakness. As per the pt, she woke up early in the AM to use the bathroom but was unable to lift herself up, and instead slid off of her bed and onto the floor. When she woke up later, she again, was feeling weak, and called her son-in-law, who recommended presenting to the ED. Patient denies associated dizziness/vertigo, LOC, falling/hitting her head, CP, palpitations, or focal neurologic deficits. She also denies recent illness/sick contacts, fevers/chills, cough, ZAMAN, abd pain/n/v/d, or have urinary complaints, though she does endorse dry mouth, chronic post nasal drip, and recent constipation relieved w/ bowel regimen w/ last BM yesterday. Of note, pt is under current treatment w/ regular wound care and s/p 10d course of Augmentin for LLE cellulitis/ulceration.     ED Presenting Vitals: 89F, 71bpm, 166/73, 19br/m, 95% on O2 supp  ED Course: labs, CXR 2 views, and CT Head performed; s/p 500cc NS at 75cc/hr

## 2018-03-31 NOTE — H&P ADULT - PROBLEM SELECTOR PLAN 6
- pt does not appear to be in decompensated state, despite LE edema B/L  - will hold Lasix given signs/sx of hypovolemia as above but will closely monitor fluid status, renal function, and electrolytes  - continue home Losartan 100mg daily, and Carvedilol 12.5mg BID - continue home ARB, beta blocker as above, and Ranexa 500mg BID  - pt states she does not need to be on a statin despite hx of PCI w/ stents to RCA  - will obtain collateral from cardiologist, Dr. Vidal - lower than baseline of 110-130s  - no signs/sx of bleeding/bruising  - will continue to monitor

## 2018-03-31 NOTE — CHART NOTE - NSCHARTNOTEFT_GEN_A_CORE
ELECTROPHYSIOLOGY  Device Interrogation Performed                                   :         Hansel Sanderson                Model:      Teligen    100 [id number]              Mode:         VVI                  Lower Rate: 50        Total /BIV pacing:    Atrial Lead:  P wave amplitude:              0.6 mv          Impedence:      258 Ohms      Threshold:         Unable to obtain in a fib    Ventricular Lead(s):  RV Lead: R wave amplitude:              3.8mv          Impedence:    461 Ohms      Threshold:       0.8 V@ 0.5ms     Battery Status:                     Good       (3 years remaining)    Underlying Rhythm:   atrial fibrillation    Events/Observation: No events noted since previous interrogation in February     Impression/Plan:  Normal PPM / ICD function.   Normal sensing and pacing via iterative testing. Good battery status. Excellent threshold capture.  No reprogramming.

## 2018-03-31 NOTE — H&P ADULT - PROBLEM SELECTOR PLAN 5
- - pt does not appear to be in decompensated state, despite LE edema B/L  - will hold Lasix given signs/sx of hypovolemia as above but will closely monitor fluid status, renal function, and electrolytes  - continue home Losartan 100mg daily, and Carvedilol 12.5mg BID - chronic and at baseline as per review of HIE (Hgb in 9-10s)

## 2018-03-31 NOTE — H&P ADULT - PROBLEM SELECTOR PROBLEM 8
Atrial fibrillation Coronary artery disease of native artery of native heart with stable angina pectoris

## 2018-03-31 NOTE — H&P ADULT - PROBLEM SELECTOR PLAN 7
- continue home ARB, beta blocker as above, and Ranexa 500mg BID  - pt states she does not need to be on a statin despite hx of PCI w/ stents to RCA  - will obtain collateral from cardiologist, Dr. Vidal - rate controlled  - will continue anticoagulation w/ Eliquis 2.5mg BID (as per pt, she tends to bleed, and was then placed on reduced dosing, despite no restrictions based on age/weight/renal function)  - continue beta blocker as above - pt does not appear to be in decompensated state, despite LE edema B/L  - will hold Lasix given signs/sx of hypovolemia as above but will closely monitor fluid status, renal function, and electrolytes  - continue home Losartan 100mg daily, and Carvedilol 12.5mg BID

## 2018-03-31 NOTE — H&P ADULT - NSHPPHYSICALEXAM_GEN_ALL_CORE
Vital Signs Last 24 Hrs  T(F): 97.6 (31 Mar 2018 14:30), Max: 97.6 (31 Mar 2018 14:30)  HR: 88 (31 Mar 2018 14:30) (71 - 88)  BP: 121/76 (31 Mar 2018 14:30) (121/76 - 166/73)  RR: 24 (31 Mar 2018 14:30) (19 - 24)  SpO2: 95% (31 Mar 2018 14:30) (95% - 97%) Vital Signs Last 24 Hrs  T(F): 97.6 (31 Mar 2018 14:30), Max: 97.6 (31 Mar 2018 14:30)  HR: 88 (31 Mar 2018 14:30) (71 - 88)  BP: 121/76 (31 Mar 2018 14:30) (121/76 - 166/73)  RR: 24 (31 Mar 2018 14:30) (19 - 24)  SpO2: 95% (31 Mar 2018 14:30) (95% - 97%)    GEN: elderly obese female, sitting up in bed, in NAD   SKIN: intact, no e/o rash, except for LLE ulceration (healing w/ clear discharge, bandaged)  EYES: PERRL, anicteric  HEAD: NC/AT  NECK: supple, no e/o elevated JVP appreciated  RESPI: no accessory muscle use, pt able to speak in full sentences on 2L O2 via NC; B/L air entry, CTAB  CARDIO: irregular rhythm, B/L LE edema (2+, pitting)  ABD: soft, NT, ND, obese, +BS  NEURO: no focal deficits appreciated  EXT: pt able to move all extremities spontaneously  VASC: peripheral pulses palpated B/L

## 2018-03-31 NOTE — H&P ADULT - PROBLEM SELECTOR PROBLEM 7
Coronary artery disease of native artery of native heart with stable angina pectoris Atrial fibrillation Chronic systolic heart failure

## 2018-03-31 NOTE — H&P ADULT - NSHPSOCIALHISTORY_GEN_ALL_CORE
Patient lives at home with her , typically able to perform all ADLs and IADLs independently, though she has been asked to remain off of her feet as her LLE ulcer heals (as per the pt, per wound care physician's instructions).

## 2018-03-31 NOTE — H&P ADULT - PROBLEM SELECTOR PLAN 4
- likely hypovolemic hyponatremia given endorsed symptoms of dry mouth, and clinical findings  - uptrending w/ IVF administration in ED  - will hold diuretics and IVF at this time, and encourage PO intake of fluids w/ close monitoring of fluid status and Na trend

## 2018-03-31 NOTE — H&P ADULT - PROBLEM SELECTOR PLAN 8
- rate controlled  - will continue anticoagulation w/ Eliquis 2.5mg BID (as per pt, she tends to bleed, and was then placed on reduced dosing, despite no restrictions based on age/weight/renal function)  - continue beta blocker as above - continue home ARB, beta blocker as above, and Ranexa 500mg BID  - pt states she does not need to be on a statin despite hx of PCI w/ stents to RCA  - will obtain collateral from cardiologist, Dr. Vidal - continue home ARB, beta blocker as above, and Ranexa 500mg BID  - pt states she does not need to be on a statin despite hx of PCI w/ stents to RCA  - will obtain collateral from cardiologist, Dr. Vidal    ADDENDUM: Spoke with on-call cardiologist, Dr. Menjivar, at [phone], whose group (?Dr. Russell) will see the patient in the AM. She is in agreement with plan for AICD interrogation with determination for need for telemetry monitoring thereafter. Patient and her daughters are also in agreement with this plan. Hospital  [place], [place] [phone] made aware. - continue home ARB, beta blocker as above, and Ranexa 500mg BID  - pt states she does not need to be on a statin despite hx of PCI w/ stents to RCA  - will obtain collateral from cardiologist, Dr. Vidal    ADDENDUM: Spoke with on-call cardiologist, Dr. Menjivar, at [phone], whose group (?Dr. Russell) will see the patient in the AM. She is in agreement with plan for AICD interrogation with determination for need for telemetry monitoring thereafter. Patient and her daughters are also in agreement with this plan. Hospital  [place] [phone] made aware, as well as overnight NP.

## 2018-03-31 NOTE — H&P ADULT - ASSESSMENT
77yoF w/ PMHx significant for HFrEF (LVEF = 10%) s/p AICD, CAD s/p stents w/ stable angina (pt on beta blocker and Ranolazine), Afib (on reduced dose Eliquis 2/2 "propensity for bleeding" as per family), pulmonary fibrosis on home O2 (2.5L via NC; and intermittent NIV, unclear whether CPAP vs. BiPAP, though pt noncompliant), AAA s/p repair, who presents from home where she lives with her , w/ complaints of generalized weakness. She is being admitted for her complaints, along w/ acute on chronic respiratory failure / respiratory acidosis, and hyponatremia.

## 2018-03-31 NOTE — H&P ADULT - NEGATIVE OPHTHALMOLOGIC SYMPTOMS
no discharge R/no diplopia/no discharge L/no pain L/no pain R/no loss of vision L/no loss of vision R

## 2018-04-01 LAB
ANION GAP SERPL CALC-SCNC: 8 MMOL/L — SIGNIFICANT CHANGE UP (ref 5–17)
BASOPHILS # BLD AUTO: 0 K/UL — SIGNIFICANT CHANGE UP (ref 0–0.2)
BASOPHILS NFR BLD AUTO: 0 % — SIGNIFICANT CHANGE UP (ref 0–2)
BUN SERPL-MCNC: 10 MG/DL — SIGNIFICANT CHANGE UP (ref 7–23)
CALCIUM SERPL-MCNC: 9.4 MG/DL — SIGNIFICANT CHANGE UP (ref 8.4–10.5)
CHLORIDE SERPL-SCNC: 84 MMOL/L — LOW (ref 96–108)
CO2 SERPL-SCNC: 37 MMOL/L — HIGH (ref 22–31)
CREAT SERPL-MCNC: 0.62 MG/DL — SIGNIFICANT CHANGE UP (ref 0.5–1.3)
CULTURE RESULTS: NO GROWTH — SIGNIFICANT CHANGE UP
EOSINOPHIL # BLD AUTO: 0 K/UL — SIGNIFICANT CHANGE UP (ref 0–0.5)
EOSINOPHIL NFR BLD AUTO: 0 % — SIGNIFICANT CHANGE UP (ref 0–6)
GLUCOSE SERPL-MCNC: 177 MG/DL — HIGH (ref 70–99)
HCT VFR BLD CALC: 31.4 % — LOW (ref 34.5–45)
HGB BLD-MCNC: 9.4 G/DL — LOW (ref 11.5–15.5)
LYMPHOCYTES # BLD AUTO: 0.31 K/UL — LOW (ref 1–3.3)
LYMPHOCYTES # BLD AUTO: 8 % — LOW (ref 13–44)
MAGNESIUM SERPL-MCNC: 1.9 MG/DL — SIGNIFICANT CHANGE UP (ref 1.6–2.6)
MCHC RBC-ENTMCNC: 26.9 PG — LOW (ref 27–34)
MCHC RBC-ENTMCNC: 29.9 GM/DL — LOW (ref 32–36)
MCV RBC AUTO: 90 FL — SIGNIFICANT CHANGE UP (ref 80–100)
MONOCYTES # BLD AUTO: 0.16 K/UL — SIGNIFICANT CHANGE UP (ref 0–0.9)
MONOCYTES NFR BLD AUTO: 4 % — SIGNIFICANT CHANGE UP (ref 2–14)
NEUTROPHILS # BLD AUTO: 3.41 K/UL — SIGNIFICANT CHANGE UP (ref 1.8–7.4)
NEUTROPHILS NFR BLD AUTO: 88 % — HIGH (ref 43–77)
PHOSPHATE SERPL-MCNC: 2.8 MG/DL — SIGNIFICANT CHANGE UP (ref 2.5–4.5)
PLATELET # BLD AUTO: 93 K/UL — LOW (ref 150–400)
POTASSIUM SERPL-MCNC: 4.2 MMOL/L — SIGNIFICANT CHANGE UP (ref 3.5–5.3)
POTASSIUM SERPL-SCNC: 4.2 MMOL/L — SIGNIFICANT CHANGE UP (ref 3.5–5.3)
RBC # BLD: 3.49 M/UL — LOW (ref 3.8–5.2)
RBC # FLD: 15.2 % — HIGH (ref 10.3–14.5)
SODIUM SERPL-SCNC: 129 MMOL/L — LOW (ref 135–145)
SPECIMEN SOURCE: SIGNIFICANT CHANGE UP
WBC # BLD: 3.88 K/UL — SIGNIFICANT CHANGE UP (ref 3.8–10.5)
WBC # FLD AUTO: 3.88 K/UL — SIGNIFICANT CHANGE UP (ref 3.8–10.5)

## 2018-04-01 PROCEDURE — 99233 SBSQ HOSP IP/OBS HIGH 50: CPT

## 2018-04-01 RX ORDER — SODIUM CHLORIDE 9 MG/ML
500 INJECTION INTRAMUSCULAR; INTRAVENOUS; SUBCUTANEOUS
Qty: 0 | Refills: 0 | Status: DISCONTINUED | OUTPATIENT
Start: 2018-04-01 | End: 2018-04-01

## 2018-04-01 RX ADMIN — CARVEDILOL PHOSPHATE 12.5 MILLIGRAM(S): 80 CAPSULE, EXTENDED RELEASE ORAL at 06:34

## 2018-04-01 RX ADMIN — RANOLAZINE 500 MILLIGRAM(S): 500 TABLET, FILM COATED, EXTENDED RELEASE ORAL at 06:34

## 2018-04-01 RX ADMIN — APIXABAN 2.5 MILLIGRAM(S): 2.5 TABLET, FILM COATED ORAL at 06:34

## 2018-04-01 RX ADMIN — CARVEDILOL PHOSPHATE 12.5 MILLIGRAM(S): 80 CAPSULE, EXTENDED RELEASE ORAL at 18:53

## 2018-04-01 RX ADMIN — RANOLAZINE 500 MILLIGRAM(S): 500 TABLET, FILM COATED, EXTENDED RELEASE ORAL at 18:53

## 2018-04-01 RX ADMIN — APIXABAN 2.5 MILLIGRAM(S): 2.5 TABLET, FILM COATED ORAL at 18:53

## 2018-04-01 RX ADMIN — LOSARTAN POTASSIUM 100 MILLIGRAM(S): 100 TABLET, FILM COATED ORAL at 06:34

## 2018-04-01 NOTE — PROGRESS NOTE ADULT - ASSESSMENT
77yoF w/ PMHx significant for HFrEF (LVEF = 10%) s/p AICD, CAD s/p stents w/ stable angina (pt on beta blocker and Ranolazine), Afib (on reduced dose Eliquis 2/2 "propensity for bleeding" as per family), pulmonary fibrosis on home O2 (2.5L via NC; and intermittent NIV, unclear whether CPAP vs. BiPAP, though pt noncompliant), AAA s/p repair, who presents from home where she lives with her , w/ complaints of generalized weakness. She is being admitted for her complaints, along w/ acute on chronic respiratory failure / respiratory acidosis, and hyponatremia. 77yoF w/ PMHx significant for HFrEF (LVEF = 19%) s/p AICD, CAD s/p stents w/ stable angina (pt on beta blocker and Ranolazine), Afib (on reduced dose Eliquis 2/2 "propensity for bleeding" as per family), interstitial fibrosis d/t Amiodarone toxicity, on home O2 (2.5L via NC; and intermittent NIV, unclear whether CPAP vs. BiPAP, though pt noncompliant), AAA s/p repair, who presents from home where she lives with her , w/ complaints of generalized weakness. She is being admitted for her complaints, along w/ acute on chronic respiratory failure / respiratory acidosis, and hyponatremia.

## 2018-04-01 NOTE — PROGRESS NOTE ADULT - PROBLEM SELECTOR PLAN 3
- improved with NIV as above  - etiology could be related to chronic lung disease vs. excessive supplemental O2 administration in chronic hypercarbic respiratory failure  - will obtain ABGs and monitor closely as above

## 2018-04-01 NOTE — PROGRESS NOTE ADULT - PROBLEM SELECTOR PLAN 2
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 Arciniega, but she is very noncompliant as the machine exacerbates her dry mouth and she finds it intolerable  - will continue AVAPS as serial ABG monitoring with improvements in pCO2 and pH; will attempt to wean daytime use with continued encouragement for adherence to nighttime use  - pulmonology's recommendations appreciated

## 2018-04-01 NOTE — PROGRESS NOTE ADULT - PROBLEM SELECTOR PLAN 7
- pt does not appear to be in decompensated state, despite LE edema B/L  - will hold Lasix given signs/sx of hypovolemia as above but will closely monitor fluid status (strict Is/Os, daily weights, PEx), renal function, and electrolytes  - continue home Losartan 100mg daily, and Carvedilol 12.5mg BID

## 2018-04-01 NOTE — PROGRESS NOTE ADULT - PROBLEM SELECTOR PLAN 4
- likely hypovolemic hyponatremia given endorsed symptoms of dry mouth, and clinical findings  - uptrending w/ IVF administration in ED; will monitor trend w/ AM BMP  - will hold diuretics and IVF at this time, and encourage PO intake of fluids w/ close monitoring of fluid status and Na trend

## 2018-04-01 NOTE — PROGRESS NOTE ADULT - PROBLEM SELECTOR PLAN 1
- unclear etiology, no focal neurologic deficits, CT Head unremarkable, ECG w/ rate controlled Afib  - AICD by cardiology fellow overnight; no events since last interrogation in February  - ?d/t hypercarbia as below +/- deconditioning in the setting of recent LLE ulcer management and decreased activity level  - pt states she is feeling significantly better this AM  - awaiting PT consult

## 2018-04-01 NOTE — PROVIDER CONTACT NOTE (CRITICAL VALUE NOTIFICATION) - ACTION/TREATMENT ORDERED:
Provider aware, spoke with respiratory . Will continue to monitor for safety and comfort
PA aware and will/ visited pt. Respiratory made aware and at bedside

## 2018-04-01 NOTE — CHART NOTE - NSCHARTNOTEFT_GEN_A_CORE
Pt noted to be hypercarbic on initial ABG's, range of 81-85, in respiratory acidosis.   Pt was initially on BiPAP, transitioned to AVAPS, & settings adjusted accordingly.  Last ABG on AVAPS FiO2 28% is Blood Gas Arterial, Lactate (04.01.18 @ 04:54)    Blood Gas Arterial, Lactate: 0.5  Blood Gas Profile - Arterial (04.01.18 @ 04:54)    pH, Arterial: 7.43    pCO2, Arterial: 62 mmHg    pO2, Arterial: 70 mmHg    HCO3, Arterial: 40 mmol/L    Base Excess, Arterial: 14.3 mmol/L    Oxygen Saturation, Arterial: 96 %    Total CO2, Arterial: 42 mmoL/L    FIO2, Arterial: 28    Blood Gas Source Arterial: Arterial  Pt seen & examined, reported feeling well, having no cp, sob, dizziness, n/v or palpitations.   PHYSICAL EXAM:      Constitutional: WD, NAD  Respiratory: Decreased BS charlie, no wheezing or rales appreciated  Cardiovascular: S1 S2, no murmurs  Gastrointestinal: Soft +BS, non tender, non distended  Extremities: Pedal pulses palpable charlie; Charlie. 2-3 + pitting edema noted  Neurological: A & O  Musculoskeletal: MARTINEZ charlie.    Assessment & Plan:  77yoF w/ PMHx significant for HFrEF (LVEF = 10%) s/p AICD, CAD s/p stents w/ stable angina (pt on beta blocker and Ranolazine), Afib (on reduced dose Eliquis 2/2 "propensity for bleeding" as per family), pulmonary fibrosis on home O2 (2.5L via NC; and intermittent NIV, unclear whether CPAP vs. BiPAP, though pt noncompliant), AAA s/p repair, who presents from home where she lives with her , w/ complaints of generalized weakness. She is being admitted for her complaints, along w/ acute on chronic respiratory failure / respiratory acidosis, and hyponatremia.   Pt begun on AVAPS overnight for hypercarbia, now has resolved.    PLAN:  Continue AVAPS on current settings  Will endorse to day team    Follow up with Attending in VIVIEN Laws PA-C  #[id number]

## 2018-04-01 NOTE — PROGRESS NOTE ADULT - PROBLEM SELECTOR PLAN 8
- continue home ARB, beta blocker as above, and Ranexa 500mg BID  - pt states she does not need to be on a statin despite hx of PCI w/ stents to RCA  - cardiology consulted on admission

## 2018-04-01 NOTE — PROGRESS NOTE ADULT - SUBJECTIVE AND OBJECTIVE BOX
Patient is a 77y old  Female who presents with a chief complaint of generalized weakness (31 Mar 2018 15:30)    SUBJECTIVE / OVERNIGHT EVENTS: Patient seen and examined. Overnight as serial ABGs revealed uptrending pCO2 while pt was on BiPAP, respiratory mode was switched to AVAPS with resultant decrease pCO2, and improved tolerance of NIV as endorsed by patient, and marked improvement in pt's energy level this morning. Additionally, patient's AICD was interrogated by cardiology revealing no events since previous interrogation in February, underlying rhythm: atrial fibrillation, normal sensing and pacing, good battery status, excellent threshold capture. Patient does not endorse any complaints this morning.     MEDICATIONS  (STANDING):  apixaban 2.5 milliGRAM(s) Oral every 12 hours  carvedilol 12.5 milliGRAM(s) Oral every 12 hours  losartan 100 milliGRAM(s) Oral daily  ranolazine 500 milliGRAM(s) Oral two times a day    Vital Signs Last 24 Hrs  T(F): 98 (2018 06:00), Max: 98.2 (31 Mar 2018 16:47)  HR: 97 (2018 06:45) (60 - 97)  BP: 131/75 (2018 06:00) (121/76 - 178/78)  RR: 22 (2018 06:00) (18 - 24)  SpO2: 92% (2018 06:45) (83% - 99%)    I&O's Summary  31 Mar 2018 07:01  -  2018 07:00  --------------------------------------------------------  IN: 705 mL / OUT: 100 mL / NET: 605 mL    PHYSICAL EXAM:      LABS:                     10.5   4.0   )-----------( 98       ( 31 Mar 2018 10:16 )             32.9     03-31    133<L>  |  86<L>  |  12  ----------------------------<  93  5.2   |  41<H>  |  0.67    Ca    9.4      31 Mar 2018 12:24    TPro  7.6  /  Alb  3.9  /  TBili  0.6  /  DBili  x   /  AST  30  /  ALT  10  /  AlkPhos  61  03-31    PT/INR - ( 31 Mar 2018 10:16 )   PT: 14.9 sec;   INR: 1.37 ratio    PTT - ( 31 Mar 2018 10:16 )  PTT:29.3 sec    CARDIAC MARKERS ( 31 Mar 2018 10:16 )  x     / <0.01 ng/mL / 72 U/L / x     / 1.5 ng/mL    Urinalysis Basic - ( 31 Mar 2018 13:18 )  Color: Joy / Appearance: Clear / S.019 / pH: x  Gluc: x / Ketone: Negative  / Bili: Negative / Urobili: 2 mg/dL   Blood: x / Protein: 30 mg/dL / Nitrite: Negative   Leuk Esterase: Negative / RBC: 0-2 /HPF / WBC 0-2 /HPF   Sq Epi: x / Non Sq Epi: OCC /HPF / Bacteria: x      Consultant(s) Notes Reviewed:  Pulmonology Patient is a 77y old  Female who presents with a chief complaint of generalized weakness (31 Mar 2018 15:30)    SUBJECTIVE / OVERNIGHT EVENTS: Patient seen and examined. Overnight as serial ABGs revealed uptrending pCO2 while pt was on BiPAP, respiratory mode was switched to AVAPS with resultant decrease pCO2, and improved tolerance of NIV as endorsed by patient, and marked improvement in pt's energy level this morning. Additionally, patient's AICD was interrogated by cardiology revealing no events since previous interrogation in February, underlying rhythm: atrial fibrillation, normal sensing and pacing, good battery status, excellent threshold capture. Patient does not endorse any complaints this morning.     MEDICATIONS  (STANDING):  apixaban 2.5 milliGRAM(s) Oral every 12 hours  carvedilol 12.5 milliGRAM(s) Oral every 12 hours  losartan 100 milliGRAM(s) Oral daily  ranolazine 500 milliGRAM(s) Oral two times a day    Vital Signs Last 24 Hrs  T(F): 98 (01 Apr 2018 06:00), Max: 98.2 (31 Mar 2018 16:47)  HR: 97 (01 Apr 2018 06:45) (60 - 97)  BP: 131/75 (01 Apr 2018 06:00) (121/76 - 178/78)  RR: 22 (01 Apr 2018 06:00) (18 - 24)  SpO2: 92% (01 Apr 2018 06:45) (83% - 99%)    I&O's Summary  31 Mar 2018 07:01  -  01 Apr 2018 07:00  --------------------------------------------------------  IN: 705 mL / OUT: 100 mL / NET: 605 mL    PHYSICAL EXAM:  GEN: elderly obese female, sitting up in bed, in NAD   SKIN: intact, no e/o rash, except for LLE ulceration (healing w/ clear discharge, bandaged)  NECK: supple, no e/o elevated JVP appreciated  RESPI: no accessory muscle use, pt able to speak in full sentences back on 2L O2 via NC awaiting breakfast; B/L air entry, CTAB  CARDIO: irregular rhythm, B/L LE edema (2+, pitting)  ABD: soft, NT, ND, obese, +BS  NEURO: no focal deficits appreciated  PSYCH: A&Ox3  EXT: pt able to move all extremities spontaneously  VASC: peripheral pulses palpated B/L    LABS:          Pending CBC / BMP for 4/1/18.    Blood Gas Profile - Arterial (04.01.18 @ 04:54)    pH, Arterial: 7.43    pCO2, Arterial: 62 mmHg    pO2, Arterial: 70 mmHg    HCO3, Arterial: 40 mmol/L    Base Excess, Arterial: 14.3 mmol/L    Oxygen Saturation, Arterial: 96 %    Total CO2, Arterial: 42 mmoL/L    FIO2, Arterial: 28    Blood Gas Source Arterial: Arterial    Blood Gas Profile - Arterial (04.01.18 @ 03:14)    pH, Arterial: 7.31    pCO2, Arterial: 85 mmHg    pO2, Arterial: 116 mmHg    HCO3, Arterial: 41 mmol/L    Base Excess, Arterial: 13.1 mmol/L    Oxygen Saturation, Arterial: 99 %    Total CO2, Arterial: 44 mmoL/L    Blood Gas Source Arterial: Arterial    Blood Gas Profile - Arterial (03.31.18 @ 22:43)    pH, Arterial: 7.33    pCO2, Arterial: 81 mmHg    pO2, Arterial: 111 mmHg    HCO3, Arterial: 41 mmol/L    Base Excess, Arterial: 13.4 mmol/L    Oxygen Saturation, Arterial: 99 %    Total CO2, Arterial: 44 mmoL/L    FIO2, Arterial: 40    Consultant(s) Notes Reviewed:  Pulmonology

## 2018-04-01 NOTE — CONSULT NOTE ADULT - SUBJECTIVE AND OBJECTIVE BOX
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 Mild PRN productive cough,without chest pain, fever or hemoptysis.    PAST MEDICAL & SURGICAL HISTORY:  Pulmonary fibrosis  CHF (congestive heart failure)  AICD (automatic cardioverter/defibrillator) present  Atrial fibrillation  Stented coronary artery  H/O hernia repair  H/O abdominal hysterectomy  Admitted for hyponatremia last year      FAMILY HISTORY:  No pertinent family history in first degree relatives      SOCIAL HISTORY:  Smoking: [ x] Never Smoked [ ] Former Smoker (__ packs x ___ years) [ ] Current Smoker  (__ packs x ___ years)  Substance Use: [x ] Never Used [ ] Used ____  EtOH Use: None  Marital Status: [ ] Single [x ]  [ ]  [ ]       Allergies    latex (Unknown)  No Known Drug Allergies          REVIEW OF SYSTEMS:  Constitutional: [ ] fevers [ ] chills [ ] weight loss [ ] weight gain  HEENT: [ ] dry eyes [ ] eye irritation [ ] postnasal drip [ ] nasal congestion  CV: [ ] chest pain [ ] orthopnea [ ] palpitations [x ] murmur  Resp: [x ] cough [ ] shortness of breath [ ] dyspnea [ ] wheezing [x ] sputum [ ] hemoptysis  GI: [ ] nausea [ ] vomiting [ ] diarrhea [ ] constipation [ ] abd pain [ ] dysphagia   : [ ] dysuria [ ] nocturia [ ] hematuria [ ] increased urinary frequency  Musculoskeletal: [ ] back pain [ ] myalgias [ ] arthralgias [ ] fracture  Skin: [ ] rash [ ] itch  Neurological: [ ] headache [ ] dizziness [ ] syncope [ x] weakness [ ] numbness  Psychiatric: [ ] anxiety [ ] depression  Endocrine: [ ] diabetes [ ] thyroid problem    OBJECTIVE:  T(C): 36.7 (2018 06:00), Max: 36.8 (31 Mar 2018 16:47)  T(F): 98 (2018 06:00), Max: 98.2 (31 Mar 2018 16:47)  HR: 97 (2018 06:45) (60 - 97)  BP: 131/75 (2018 06:00) (121/76 - 178/78)  RR: 22 (2018 06:00) (18 - 24)  SpO2: 92% (2018 06:45) (83% - 99%)         @ 07:01  -  04-01 @ 07:00  --------------------------------------------------------  IN: 225 mL / OUT: 100 mL / NET: 125 mL            PHYSICAL EXAM:  General: A&O  NAD sitting up on nasal O2  Neck: Supple JVD present  Respiratory: Mild inspiratory crackles at bases  Cardiovascular: Irr R&R  2/6 DC  Abdomen: obese non tender  Extremities: pitting edema with chronic stasis changes  Neurological: no focal findings      HOSPITAL MEDICATIONS:  MEDICATIONS  (STANDING):  apixaban 2.5 milliGRAM(s) Oral every 12 hours  carvedilol 12.5 milliGRAM(s) Oral every 12 hours  losartan 100 milliGRAM(s) Oral daily  ranolazine 500 milliGRAM(s) Oral two times a day  sodium chloride 0.9%. 500 milliLiter(s) (75 mL/Hr) IV Continuous <Continuous>    MEDICATIONS  (PRN):      LABS:                        10.5   4.0   )-----------( 98       ( 31 Mar 2018 10:16 )             32.9     Hgb Trend: 10.5<--      133<L>  |  86<L>  |  12  ----------------------------<  93  5.2   |  41<H>  |  0.67    Ca    9.4      31 Mar 2018 12:24    TPro  7.6  /  Alb  3.9  /  TBili  0.6  /  DBili  x   /  AST  30  /  ALT  10  /  AlkPhos  61  31    Creatinine Trend: 0.67<--, 0.63<--  PT/INR - ( 31 Mar 2018 10:16 )   PT: 14.9 sec;   INR: 1.37 ratio         PTT - ( 31 Mar 2018 10:16 )  PTT:29.3 sec  Urinalysis Basic - ( 31 Mar 2018 13:18 )    Color: Joy / Appearance: Clear / S.019 / pH: x  Gluc: x / Ketone: Negative  / Bili: Negative / Urobili: 2 mg/dL   Blood: x / Protein: 30 mg/dL / Nitrite: Negative   Leuk Esterase: Negative / RBC: 0-2 /HPF / WBC 0-2 /HPF   Sq Epi: x / Non Sq Epi: OCC /HPF / Bacteria: x      Arterial Blood Gas:   @ 04:54  7.43/62/70/40/96/14.3  ABG lactate: --  Arterial Blood Gas:   @ 03:14  7.31/85/116/41/99/13.1  ABG lactate: --  Arterial Blood Gas:   @ 22:43  7.33/81/111/41/99/13.4  ABG lactate: --    Venous Blood Gas:   @ 12:43  7.28/98/40/45/70  VBG Lactate: 1.2  Venous Blood Gas:   @ 10:16  7.29/98/40/45/70  VBG Lactate: 1.1      RADIOLOGY:  [ x] Reviewed and interpreted by me

## 2018-04-02 LAB
ANION GAP SERPL CALC-SCNC: 5 MMOL/L — SIGNIFICANT CHANGE UP (ref 5–17)
BUN SERPL-MCNC: 10 MG/DL — SIGNIFICANT CHANGE UP (ref 7–23)
CALCIUM SERPL-MCNC: 9.1 MG/DL — SIGNIFICANT CHANGE UP (ref 8.4–10.5)
CHLORIDE SERPL-SCNC: 85 MMOL/L — LOW (ref 96–108)
CO2 SERPL-SCNC: 39 MMOL/L — HIGH (ref 22–31)
CREAT SERPL-MCNC: 0.63 MG/DL — SIGNIFICANT CHANGE UP (ref 0.5–1.3)
GLUCOSE SERPL-MCNC: 86 MG/DL — SIGNIFICANT CHANGE UP (ref 70–99)
MAGNESIUM SERPL-MCNC: 2 MG/DL — SIGNIFICANT CHANGE UP (ref 1.6–2.6)
POTASSIUM SERPL-MCNC: 4.3 MMOL/L — SIGNIFICANT CHANGE UP (ref 3.5–5.3)
POTASSIUM SERPL-SCNC: 4.3 MMOL/L — SIGNIFICANT CHANGE UP (ref 3.5–5.3)
SODIUM SERPL-SCNC: 129 MMOL/L — LOW (ref 135–145)

## 2018-04-02 PROCEDURE — 99233 SBSQ HOSP IP/OBS HIGH 50: CPT | Mod: GC

## 2018-04-02 RX ORDER — FUROSEMIDE 40 MG
40 TABLET ORAL
Qty: 0 | Refills: 0 | Status: DISCONTINUED | OUTPATIENT
Start: 2018-04-02 | End: 2018-04-03

## 2018-04-02 RX ORDER — FUROSEMIDE 40 MG
40 TABLET ORAL ONCE
Qty: 0 | Refills: 0 | Status: COMPLETED | OUTPATIENT
Start: 2018-04-02 | End: 2018-04-02

## 2018-04-02 RX ADMIN — APIXABAN 2.5 MILLIGRAM(S): 2.5 TABLET, FILM COATED ORAL at 21:55

## 2018-04-02 RX ADMIN — Medication 40 MILLIGRAM(S): at 18:25

## 2018-04-02 RX ADMIN — LOSARTAN POTASSIUM 100 MILLIGRAM(S): 100 TABLET, FILM COATED ORAL at 05:03

## 2018-04-02 RX ADMIN — Medication 40 MILLIGRAM(S): at 09:06

## 2018-04-02 RX ADMIN — CARVEDILOL PHOSPHATE 12.5 MILLIGRAM(S): 80 CAPSULE, EXTENDED RELEASE ORAL at 21:54

## 2018-04-02 RX ADMIN — CARVEDILOL PHOSPHATE 12.5 MILLIGRAM(S): 80 CAPSULE, EXTENDED RELEASE ORAL at 05:03

## 2018-04-02 RX ADMIN — RANOLAZINE 500 MILLIGRAM(S): 500 TABLET, FILM COATED, EXTENDED RELEASE ORAL at 05:03

## 2018-04-02 RX ADMIN — APIXABAN 2.5 MILLIGRAM(S): 2.5 TABLET, FILM COATED ORAL at 05:03

## 2018-04-02 RX ADMIN — RANOLAZINE 500 MILLIGRAM(S): 500 TABLET, FILM COATED, EXTENDED RELEASE ORAL at 21:55

## 2018-04-02 NOTE — PHYSICAL THERAPY INITIAL EVALUATION ADULT - IMPAIRMENTS FOUND, PT EVAL
ventilation and respiration/gas exchange/joint integrity and mobility/muscle strength/edema le's mod ; pulse ox drop during amb with RW w/assist 84 % on 2 L nc o2 with rest 96% on 2 L rn Sofie informed/gait, locomotion, and balance/aerobic capacity/endurance

## 2018-04-02 NOTE — CONSULT NOTE ADULT - SUBJECTIVE AND OBJECTIVE BOX
CHIEF COMPLAINT:Patient is a 77y old  Female who presents with a chief complaint of generalized weakness (31 Mar 2018 15:30)      HISTORY OF PRESENT ILLNESS:HPI:  77yoF w/ PMHx significant for HFrEF (LVEF = 19%) s/p AICD, CAD s/p stents w/ stable angina (pt on beta blocker and Ranolazine), Afib (on reduced dose Eliquis 2/2 "propensity for bleeding" as per family), pulmonary fibrosis on home O2 (2.5L via NC; and intermittent NIV, unclear whether CPAP vs. BiPAP, though pt noncompliant), AAA s/p repair, who presents from home where she lives with her , w/ complaints of generalized weakness. As per the pt, she woke up early in the AM to use the bathroom but was unable to lift herself up, and instead slid off of her bed and onto the floor. When she woke up later, she again, was feeling weak, and called her son-in-law, who recommended presenting to the ED. Patient denies associated dizziness/vertigo, LOC, falling/hitting her head, CP, palpitations, or focal neurologic deficits. She also denies recent illness/sick contacts, fevers/chills, cough, ZAMAN, abd pain/n/v/d, or have urinary complaints, though she does endorse dry mouth, chronic post nasal drip, and recent constipation relieved w/ bowel regimen w/ last BM yesterday. Of note, pt is under current treatment w/ regular wound care and s/p 10d course of Augmentin for LLE cellulitis/ulceration.     ED Presenting Vitals: 97F, 71bpm, 166/73, 19br/m, 95% on O2 supp  ED Course: labs, CXR 2 views, and CT Head performed; s/p 500cc NS at 75cc/hr (31 Mar 2018 15:30)      PAST MEDICAL & SURGICAL HISTORY:  Pulmonary fibrosis  CHF (congestive heart failure)  AICD (automatic cardioverter/defibrillator) present  Atrial fibrillation  Stented coronary artery  H/O hernia repair  H/O abdominal hysterectomy          MEDICATIONS:  apixaban 2.5 milliGRAM(s) Oral every 12 hours  carvedilol 12.5 milliGRAM(s) Oral every 12 hours  furosemide   Injectable 40 milliGRAM(s) IV Push two times a day  losartan 100 milliGRAM(s) Oral daily  ranolazine 500 milliGRAM(s) Oral two times a day        FAMILY HISTORY:  No pertinent family history in first degree relatives      Non-contributory    SOCIAL HISTORY:    not a smoker    Allergies    latex (Unknown)  No Known Drug Allergies    Intolerances    	    REVIEW OF SYSTEMS:  CONSTITUTIONAL: No fever  EYES: No eye pain, visual disturbances, or discharge  ENMT:  No difficulty hearing, tinnitus  NECK: No pain or stiffness  RESPIRATORY: No cough, wheezing,  CARDIOVASCULAR: No chest pain, palpitations, passing out, dizziness, + worsening leg swelling  GASTROINTESTINAL:  No nausea, vomiting, diarrhea or constipation. No melena.  GENITOURINARY: No dysuria, hematuria  NEUROLOGICAL: No stroke like symptoms  SKIN: No burning or lesions   ENDOCRINE: No heat or cold intolerance  MUSCULOSKELETAL: No joint pain or swelling  PSYCHIATRIC: No  anxiety, mood swings  HEME/LYMPH: No bleeding gums  ALLERY AND IMMUNOLOGIC: No hives or eczema	    All other ROS negative    PHYSICAL EXAM:  T(C): 36.8 (04-02-18 @ 08:31), Max: 37 (04-01-18 @ 19:48)  HR: 62 (04-02-18 @ 08:31) (54 - 99)  BP: 150/75 (04-02-18 @ 08:31) (120/63 - 150/75)  RR: 20 (04-02-18 @ 08:31) (18 - 20)  SpO2: 96% (04-02-18 @ 08:31) (90% - 96%)  Wt(kg): --  I&O's Summary    01 Apr 2018 07:01  -  02 Apr 2018 07:00  --------------------------------------------------------  IN: 1080 mL / OUT: 600 mL / NET: 480 mL    02 Apr 2018 07:01  -  02 Apr 2018 12:57  --------------------------------------------------------  IN: 60 mL / OUT: 550 mL / NET: -490 mL        Appearance: Normal	  HEENT:   Normal oral mucosa, EOMI	  Lymphatic: No lymphadenopathy  Cardiovascular: Normal S1 S2, No JVD, No murmurs, No edema  Respiratory: Lungs clear to auscultation	  Psychiatry: Alert, Mood & affect appropriate  Gastrointestinal:  Soft, Non-tender, + BS	  Skin: No rashes, No ecchymoses, No cyanosis	  Neurologic: Non-focal  Extremities:  + wheeping edema and chronic changes  Vascular: Peripheral pulses palpable 2+ bilaterally  	    	  	  CARDIAC MARKERS:  Troponin T, Serum: <0.01 ng/mL (03-31 @ 10:16)                                  9.4    3.88  )-----------( 93       ( 01 Apr 2018 11:45 )             31.4     04-02    129<L>  |  85<L>  |  10  ----------------------------<  86  4.3   |  39<H>  |  0.63    Ca    9.1      02 Apr 2018 06:49  Phos  2.8     04-01  Mg     2.0     04-02      proBNP: Serum Pro-Brain Natriuretic Peptide: 1948 pg/mL (03-31 @ 10:16)    Lipid Profile:   HgA1c:   TSH:     Xray Chest 1 View AP/PA (03.31.18 @ 10:58) >  IMPRESSION:  Small left pleural effusion with adjacentairspace disease; superimposed   pneumonia not excluded. Mild right basilar opacities.      EKG:      Assessment and Plan:   · Assessment		  77yoF w/ PMHx significant for HFrEF (LVEF = 19%) s/p AICD, CAD s/p stents w/ stable angina (pt on beta blocker and Ranolazine), Afib (on reduced dose Eliquis 2/2 "propensity for bleeding" as per family), interstitial fibrosis d/t Amiodarone toxicity, on home O2 (2.5L via NC; and intermittent NIV, unclear whether CPAP vs. BiPAP, though pt noncompliant), AAA s/p repair, who presents from home where she lives with her , w/ complaints of generalized weakness. She is being admitted for her complaints, along w/ acute on chronic respiratory failure / respiratory acidosis, and hyponatremia.      Problem/Plan - 1:  ·  Problem: Generalized weakness.  Plan: - unclear etiology, was likely multifactorial from ? Co2 retention, ?CHF exacerbation;   no focal neurologic deficits, CT Head unremarkable, ECG w/ rate controlled Afib  - c/w Oxygen and NIV as needed, serial ABGS with co2 monitoring  - Appreciate pulmonary recommendations.   - Appreciate cardiology recommendations. Patient will need transfer to Telemetry for congestive heart failure optimization.      Problem/Plan - 2:  ·  Problem: Acute on chronic respiratory failure with hypoxia and hypercapnia.  Plan: - on home O2 - 2.5L -  - pt chronic CO2 retainer with bicarb of 35 as outpt   - as per family, she was instructed to use NIV (BiPAP at night, I/E:12/6 at a rate of 12br/m for interstitial fibrosis related to Amiodarone toxicity), prescribed by her pulmonologist, Dr. Arciniega, but she is very noncompliant as the machine exacerbates her dry mouth and she finds it intolerable  - will continue AVAPS as needed.  -serial ABG monitoring with improvements in pCO2 and pH;   -will attempt to wean daytime use with continued encouragement for adherence to nighttime use  - pulmonology's recommendations appreciated.      Problem/Plan - 3:  ·  Problem: Respiratory acidosis.  Plan: - improved with NIV as above  - etiology could be related to chronic lung disease vs. excessive supplemental O2 administration in chronic hypercarbic respiratory failure  - will obtain ABGs and monitor closely as above.      Problem/Plan - 4:  ·  Problem: Hyponatremia.  Plan: Hypervolemic hyponatremia  C/w Diuresis.      Problem/Plan - 5:  ·  Problem: Normocytic anemia.  Plan: - chronic and at baseline as per review of HIE (Hgb in 9-10s).      Problem/Plan - 6:  Problem: Thrombocytopenia. Plan: - lower than baseline of 110-130s  - no signs/sx of bleeding/bruising  - will continue to monitor.     Problem/Plan - 7:  ·  Problem: Chronic systolic heart failure.  Plan: - Appreciate cardiology consult  - Start Lasix 40mg IVP  - closely monitor fluid status (strict Is/Os, daily weights, PEx), renal function, and electrolytes  - continue home Losartan 100mg daily, and Carvedilol 12.5mg BID.      Problem/Plan - 8:  ·  Problem: Coronary artery disease of native artery of native heart with stable angina pectoris.  Plan: - continue home ARB, beta blocker as above, and Ranexa 500mg BID  - pt states she does not need to be on a statin despite hx of PCI w/ stents to RCA  - cardiology consulted on admission.      Problem/Plan - 9:  ·  Problem: Atrial fibrillation.  Plan: - rate controlled  - will continue anticoagulation w/ Eliquis 2.5mg BID (as per pt, she tends to bleed, and was then placed on reduced dosing, despite no restrictions based on age/weight/renal function)  - continue beta blocker as above.           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 BiPAP, though pt noncompliant), AAA s/p repair, who presents from home where she lives with her , w/ complaints of generalized weakness. As per the pt, she woke up early in the AM to use the bathroom but was unable to lift herself up, and instead slid off of her bed and onto the floor. When she woke up later, she again, was feeling weak, and called her son-in-law, who recommended presenting to the ED. Patient denies associated dizziness/vertigo, LOC, falling/hitting her head, CP, palpitations, or focal neurologic deficits. She also denies recent illness/sick contacts, fevers/chills, cough, ZAMAN, abd pain/n/v/d, or have urinary complaints, though she does endorse dry mouth, chronic post nasal drip, and recent constipation relieved w/ bowel regimen w/ last BM yesterday. Of note, pt is under current treatment w/ regular wound care and s/p 10d course of Augmentin for LLE cellulitis/ulceration.     ED Presenting Vitals: 97F, 71bpm, 166/73, 19br/m, 95% on O2 supp  ED Course: labs, CXR 2 views, and CT Head performed; s/p 500cc NS at 75cc/hr (31 Mar 2018 15:30)      PAST MEDICAL & SURGICAL HISTORY:  Pulmonary fibrosis  CHF (congestive heart failure)  AICD (automatic cardioverter/defibrillator) present  Atrial fibrillation  Stented coronary artery  H/O hernia repair  H/O abdominal hysterectomy          MEDICATIONS:  apixaban 2.5 milliGRAM(s) Oral every 12 hours  carvedilol 12.5 milliGRAM(s) Oral every 12 hours  furosemide   Injectable 40 milliGRAM(s) IV Push two times a day  losartan 100 milliGRAM(s) Oral daily  ranolazine 500 milliGRAM(s) Oral two times a day        FAMILY HISTORY:  No pertinent family history in first degree relatives      Non-contributory    SOCIAL HISTORY:    not a smoker    Allergies    latex (Unknown)  No Known Drug Allergies    Intolerances    	    REVIEW OF SYSTEMS:  CONSTITUTIONAL: No fever  EYES: No eye pain, visual disturbances, or discharge  ENMT:  No difficulty hearing, tinnitus  NECK: No pain or stiffness  RESPIRATORY: No cough, wheezing,  CARDIOVASCULAR: No chest pain, palpitations, passing out, dizziness, + worsening leg swelling  GASTROINTESTINAL:  No nausea, vomiting, diarrhea or constipation. No melena.  GENITOURINARY: No dysuria, hematuria  NEUROLOGICAL: No stroke like symptoms  SKIN: No burning or lesions   ENDOCRINE: No heat or cold intolerance  MUSCULOSKELETAL: No joint pain or swelling  PSYCHIATRIC: No  anxiety, mood swings  HEME/LYMPH: No bleeding gums  ALLERY AND IMMUNOLOGIC: No hives or eczema	    All other ROS negative    PHYSICAL EXAM:  T(C): 36.8 (04-02-18 @ 08:31), Max: 37 (04-01-18 @ 19:48)  HR: 62 (04-02-18 @ 08:31) (54 - 99)  BP: 150/75 (04-02-18 @ 08:31) (120/63 - 150/75)  RR: 20 (04-02-18 @ 08:31) (18 - 20)  SpO2: 96% (04-02-18 @ 08:31) (90% - 96%)  Wt(kg): --  I&O's Summary    01 Apr 2018 07:01  -  02 Apr 2018 07:00  --------------------------------------------------------  IN: 1080 mL / OUT: 600 mL / NET: 480 mL    02 Apr 2018 07:01  -  02 Apr 2018 12:57  --------------------------------------------------------  IN: 60 mL / OUT: 550 mL / NET: -490 mL        Appearance: Normal	  HEENT:   Normal oral mucosa, EOMI	  Lymphatic: No lymphadenopathy  Cardiovascular: Normal S1 S2, No JVD, No murmurs, No edema  Respiratory: Lungs clear to auscultation	  Psychiatry: Alert, Mood & affect appropriate  Gastrointestinal:  Soft, Non-tender, + BS	  Skin: No rashes, No ecchymoses, No cyanosis	  Neurologic: Non-focal  Extremities:  + wheeping edema and chronic changes  Vascular: Peripheral pulses palpable 2+ bilaterally  	    	  	  CARDIAC MARKERS:  Troponin T, Serum: <0.01 ng/mL (03-31 @ 10:16)                                  9.4    3.88  )-----------( 93       ( 01 Apr 2018 11:45 )             31.4     04-02    129<L>  |  85<L>  |  10  ----------------------------<  86  4.3   |  39<H>  |  0.63    Ca    9.1      02 Apr 2018 06:49  Phos  2.8     04-01  Mg     2.0     04-02      proBNP: Serum Pro-Brain Natriuretic Peptide: 1948 pg/mL (03-31 @ 10:16)    Lipid Profile:   HgA1c:   TSH:     Xray Chest 1 View AP/PA (03.31.18 @ 10:58) >  IMPRESSION:  Small left pleural effusion with adjacentairspace disease; superimposed   pneumonia not excluded. Mild right basilar opacities.      EKG:      Assessment and Plan:   · Assessment		  77yoF w/ PMHx significant for HFrEF (LVEF = 19%) s/p AICD, CAD s/p stents w/ stable angina (pt on beta blocker and Ranolazine), Afib (on reduced dose Eliquis 2/2 "propensity for bleeding" as per family), interstitial fibrosis d/t Amiodarone toxicity, on home O2 (2.5L via NC; and intermittent NIV, unclear whether CPAP vs. BiPAP, though pt noncompliant), AAA s/p repair, who presents from home where she lives with her , w/ complaints of generalized weakness. She is being admitted for her complaints, along w/ acute on chronic respiratory failure / respiratory acidosis, and hyponatremia.      Problem/Plan - 1:  ·  Problem: Chronic systolic heart failure.  Plan: -   - Start Lasix 40mg IV BID  - closely monitor fluid status (strict Is/Os, daily weights, PEx), renal function, and electrolytes  - continue home Losartan 100mg daily, and Carvedilol 12.5mg BID.   - Transfer to tele       Problem/Plan - 2:  ·  Problem: Acute on chronic respiratory failure with hypoxia and hypercapnia.  Plan: - on home O2 - 2.5L -  - pt chronic CO2 retainer with bicarb of 35 as outpt one month ago  - pt appears grossly volume overloaded  - Trial of diuresis with Lasix 40mg IV BID     Problem/Plan - 3:  ·  Problem: Hyponatremia.  Plan: likely Hypervolemic hyponatremia  C/w Diuresis and trend Na.   - renal consulted Dr Sylvester       Problem/Plan - 4:  ·  Problem: Coronary artery disease of native artery of native heart with stable angina pectoris.  Plan: - continue home ARB, beta blocker as above, and Ranexa 500mg BID       Problem/Plan - 5:  ·  Problem: Atrial fibrillation.  Plan: - rate controlled  - will continue anticoagulation w/ Eliquis 2.5mg BID (home dose)  - continue beta blocker as above.   - ICD interrogated with no events           Gamaliel Russell DO [facility]  [phone]

## 2018-04-02 NOTE — PHYSICAL THERAPY INITIAL EVALUATION ADULT - IMPAIRMENTS CONTRIBUTING TO GAIT DEVIATIONS, PT EVAL
decreased strength/decrease endurance , slow intermittent min unsteady , fear of falling ,pt given emotional support/decreased sensation/impaired postural control/impaired balance/impaired sensory feedback

## 2018-04-02 NOTE — PHYSICAL THERAPY INITIAL EVALUATION ADULT - GENERAL OBSERVATIONS, REHAB EVAL
pt received in bedside recliner wheels locked call bell,phone and table , dtr Ilana present at b/s ; le's mod edema brownish discoloration lower legs and feet ; dressing L LE dorsal ankle and top foot c,d I (podiatry following per note wound healed with scab and dressing applied )pt on 2 L nc o2 humidified  pulse ox at rest 96%

## 2018-04-02 NOTE — PROGRESS NOTE ADULT - PROBLEM SELECTOR PLAN 8
- continue home ARB, beta blocker as above, and Ranexa 500mg BID  - pt states she does not need to be on a statin despite hx of PCI w/ stents to RCA  - cardiology consulted on admission - continue home ARB, beta blocker as above, and Ranexa 500mg BID  - pt states she does not need to be on a statin despite hx of PCI w/ stents to RCA  - cardiology follow up

## 2018-04-02 NOTE — PROGRESS NOTE ADULT - SUBJECTIVE AND OBJECTIVE BOX
ILAN JAIMES  MRN-[id number]    Chief Complaint: Patient is a 77y old  Female who presents with a chief complaint of generalized weakness (31 Mar 2018 15:30)      SUBJECTIVE / OVERNIGHT EVENTS:    Review of Systems: ***  14 point ROS negative in detail except for the above.  Unable to evaluate ROS due to: cognitive deficits / altered mental status    MEDICATIONS  (STANDING):  apixaban 2.5 milliGRAM(s) Oral every 12 hours  carvedilol 12.5 milliGRAM(s) Oral every 12 hours  furosemide   Injectable 40 milliGRAM(s) IV Push two times a day  losartan 100 milliGRAM(s) Oral daily  ranolazine 500 milliGRAM(s) Oral two times a day    MEDICATIONS  (PRN):      T(C): 36.8 (18 @ 08:31), Max: 37 (18 @ 19:48)  HR: 62 (18 @ 08:31) (54 - 99)  BP: 150/75 (18 @ 08:31) (120/63 - 150/75)  RR: 20 (18 @ 08:31) (18 - 20)  SpO2: 96% (18 @ 08:31) (90% - 96%)    CAPILLARY BLOOD GLUCOSE          I&O's Summary    2018 07:  -  2018 07:00  --------------------------------------------------------  IN: 1080 mL / OUT: 600 mL / NET: 480 mL    2018 07:  -  2018 10:39  --------------------------------------------------------  IN: 0 mL / OUT: 400 mL / NET: -400 mL        Allergies    latex (Unknown)  No Known Drug Allergies    Intolerances        PHYSICAL EXAM:  GENERAL: Obese female, sitting on a chair, in no acute distress  HEAD:  Atraumatic, Normocephalic  EYES: EOMI, PERRLA, conjunctiva and sclera clear  NECK: Supple, No JVD  CHEST/LUNG: CTAB   HEART: Irregular rythm, b/l  extremity edema   ABDOMEN: Soft, Nontender, Nondistended; Bowel sounds present  EXTREMITIES: b/l extremity edema   PSYCH: Normal mood and affect, alert and oriented  NEUROLOGY: No Focal neurological deficit  SKIN: No rashes or lesions    LABS:                        9.4    3.88  )-----------( 93       ( 2018 11:45 )             31.4     04-02    129<L>  |  85<L>  |  10  ----------------------------<  86  4.3   |  39<H>  |  0.63    Ca    9.1      2018 06:49  Phos  2.8     04-  Mg     2.0     -              Urinalysis Basic - ( 31 Mar 2018 13:18 )    Color: Joy / Appearance: Clear / S.019 / pH: x  Gluc: x / Ketone: Negative  / Bili: Negative / Urobili: 2 mg/dL   Blood: x / Protein: 30 mg/dL / Nitrite: Negative   Leuk Esterase: Negative / RBC: 0-2 /HPF / WBC 0-2 /HPF   Sq Epi: x / Non Sq Epi: OCC /HPF / Bacteria: x      Blood Gas Profile - Arterial (18 @ 04:54)    pH, Arterial: 7.43    pCO2, Arterial: 62 mmHg    pO2, Arterial: 70 mmHg    HCO3, Arterial: 40 mmol/L    Base Excess, Arterial: 14.3 mmol/L    Oxygen Saturation, Arterial: 96 %    Total CO2, Arterial: 42 mmoL/L    FIO2, Arterial: 28    Blood Gas Source Arterial: Arterial    Blood Gas Profile - Arterial (18 @ 22:43)    pH, Arterial: 7.33    pCO2, Arterial: 81 mmHg    pO2, Arterial: 111 mmHg    HCO3, Arterial: 41 mmol/L    Base Excess, Arterial: 13.4 mmol/L    Oxygen Saturation, Arterial: 99 %    Total CO2, Arterial: 44 mmoL/L    FIO2, Arterial: 40    Imaging:  < from: Xray Chest 1 View AP/PA (18 @ 10:58) >    IMPRESSION:  Small left pleural effusion with adjacentairspace disease; superimposed   pneumonia not excluded. Mild right basilar opacities.    EKG/Telemetry:  < from: 12 Lead ECG (18 @ 09:46) >  Diagnosis Line ATRIAL FIBRILLATION  NON-SPECIFIC INTRA-VENTRICULAR CONDUCTION BLOCK  POSSIBLE INFERIOR INFARCT , AGE UNDETERMINED  ABNORMAL ECG    Consultant(s) Notes Reviewed:  Pulmonary , Cardiology     Care Discussed with Consultants/Other Providers:  YES   The above recommendations were discussed with the patient/family. The patient/family had all questions answered and expressed understanding of the plan. ILAN JAIMES  MRN-[id number]    Chief Complaint: Patient is a 77y old  Female who presents with a chief complaint of generalized weakness (31 Mar 2018 15:30)  Reported feeling weak and tired.     SUBJECTIVE / OVERNIGHT EVENTS:    Review of Systems:   14 point ROS negative in detail except for the above.  Unable to evaluate ROS due to: cognitive deficits / altered mental status    MEDICATIONS  (STANDING):  apixaban 2.5 milliGRAM(s) Oral every 12 hours  carvedilol 12.5 milliGRAM(s) Oral every 12 hours  furosemide   Injectable 40 milliGRAM(s) IV Push two times a day  losartan 100 milliGRAM(s) Oral daily  ranolazine 500 milliGRAM(s) Oral two times a day    MEDICATIONS  (PRN):      T(C): 36.8 (18 @ 08:31), Max: 37 (18 @ 19:48)  HR: 62 (18 @ 08:31) (54 - 99)  BP: 150/75 (18 @ 08:31) (120/63 - 150/75)  RR: 20 (18 @ 08:31) (18 - 20)  SpO2: 96% (18 @ 08:31) (90% - 96%)    CAPILLARY BLOOD GLUCOSE          I&O's Summary    2018 07:  -  2018 07:00  --------------------------------------------------------  IN: 1080 mL / OUT: 600 mL / NET: 480 mL    2018 07:  -  2018 10:39  --------------------------------------------------------  IN: 0 mL / OUT: 400 mL / NET: -400 mL        Allergies    latex (Unknown)  No Known Drug Allergies    Intolerances        PHYSICAL EXAM:  GENERAL: Obese female, sitting on a chair, in no acute distress  HEAD:  Atraumatic, Normocephalic  EYES: EOMI, PERRLA, conjunctiva and sclera clear  NECK: Supple, No JVD  CHEST/LUNG: CTAB, diminished  HEART: Irregular rythm, b/l  extremity edema   ABDOMEN: Soft, Nontender, Nondistended; Bowel sounds present  EXTREMITIES: b/l extremity edema   PSYCH: Normal mood and affect, alert and oriented  NEUROLOGY: No Focal neurological deficit  SKIN: No rashes or lesions    LABS:                        9.4    3.88  )-----------( 93       ( 2018 11:45 )             31.4     04-02    129<L>  |  85<L>  |  10  ----------------------------<  86  4.3   |  39<H>  |  0.63    Ca    9.1      2018 06:49  Phos  2.8     04-  Mg     2.0     -      Urinalysis Basic - ( 31 Mar 2018 13:18 )    Color: Joy / Appearance: Clear / S.019 / pH: x  Gluc: x / Ketone: Negative  / Bili: Negative / Urobili: 2 mg/dL   Blood: x / Protein: 30 mg/dL / Nitrite: Negative   Leuk Esterase: Negative / RBC: 0-2 /HPF / WBC 0-2 /HPF   Sq Epi: x / Non Sq Epi: OCC /HPF / Bacteria: x      Blood Gas Profile - Arterial (18 @ 04:54)    pH, Arterial: 7.43    pCO2, Arterial: 62 mmHg    pO2, Arterial: 70 mmHg    HCO3, Arterial: 40 mmol/L    Base Excess, Arterial: 14.3 mmol/L    Oxygen Saturation, Arterial: 96 %    Total CO2, Arterial: 42 mmoL/L    FIO2, Arterial: 28    Blood Gas Source Arterial: Arterial    Blood Gas Profile - Arterial (18 @ 22:43)    pH, Arterial: 7.33    pCO2, Arterial: 81 mmHg    pO2, Arterial: 111 mmHg    HCO3, Arterial: 41 mmol/L    Base Excess, Arterial: 13.4 mmol/L    Oxygen Saturation, Arterial: 99 %    Total CO2, Arterial: 44 mmoL/L    FIO2, Arterial: 40    Imaging:  < from: Xray Chest 1 View AP/PA (18 @ 10:58) >    IMPRESSION:  Small left pleural effusion with adjacentairspace disease; superimposed   pneumonia not excluded. Mild right basilar opacities.    EKG/Telemetry:  < from: 12 Lead ECG (18 @ 09:46) >  Diagnosis Line ATRIAL FIBRILLATION  NON-SPECIFIC INTRA-VENTRICULAR CONDUCTION BLOCK  POSSIBLE INFERIOR INFARCT , AGE UNDETERMINED  ABNORMAL ECG    Consultant(s) Notes Reviewed:  Pulmonary , Cardiology     Care Discussed with Consultants/Other Providers:  YES   The above recommendations were discussed with the patient/family. The patient/family had all questions answered and expressed understanding of the plan. ILAN JAIMES  MRN-[id number]    Chief Complaint: Patient is a 77y old  Female who presents with a chief complaint of generalized weakness (31 Mar 2018 15:30)  Reported feeling weak and tired.     SUBJECTIVE / OVERNIGHT EVENTS: Patient seen and examined at bedside - patient doing well, feels better, no longer having respiratory distress, still feels weak, hard for her to get into the chair    Review of Systems:   14 point ROS negative in detail except for the above.    MEDICATIONS  (STANDING):  apixaban 2.5 milliGRAM(s) Oral every 12 hours  carvedilol 12.5 milliGRAM(s) Oral every 12 hours  furosemide   Injectable 40 milliGRAM(s) IV Push two times a day  losartan 100 milliGRAM(s) Oral daily  ranolazine 500 milliGRAM(s) Oral two times a day    MEDICATIONS  (PRN):      T(C): 36.8 (18 @ 08:31), Max: 37 (18 @ 19:48)  HR: 62 (18 @ 08:31) (54 - 99)  BP: 150/75 (18 @ 08:31) (120/63 - 150/75)  RR: 20 (18 @ 08:31) (18 - 20)  SpO2: 96% (18 @ 08:31) (90% - 96%)    CAPILLARY BLOOD GLUCOSE          I&O's Summary    2018 07:  -  2018 07:00  --------------------------------------------------------  IN: 1080 mL / OUT: 600 mL / NET: 480 mL    2018 07:  -  2018 10:39  --------------------------------------------------------  IN: 0 mL / OUT: 400 mL / NET: -400 mL        Allergies    latex (Unknown)  No Known Drug Allergies    Intolerances        PHYSICAL EXAM:  GENERAL: Obese female, sitting on a chair, in no acute distress  HEAD:  Atraumatic, Normocephalic  EYES: EOMI, PERRLA, conjunctiva and sclera clear  NECK: Supple, No JVD  CHEST/LUNG: CTAB, diminished  HEART: Irregular rythm, b/l  extremity edema   ABDOMEN: Soft, Nontender, Nondistended; Bowel sounds present  EXTREMITIES: b/l 2+ extremity edema   PSYCH: Normal mood and affect, alert and oriented  NEUROLOGY: No Focal neurological deficit  SKIN: No rashes or lesions    LABS:                        9.4    3.88  )-----------( 93       ( 2018 11:45 )             31.4     04-02    129<L>  |  85<L>  |  10  ----------------------------<  86  4.3   |  39<H>  |  0.63    Ca    9.1      2018 06:49  Phos  2.8     04-01  Mg     2.0     04-02      Urinalysis Basic - ( 31 Mar 2018 13:18 )    Color: Joy / Appearance: Clear / S.019 / pH: x  Gluc: x / Ketone: Negative  / Bili: Negative / Urobili: 2 mg/dL   Blood: x / Protein: 30 mg/dL / Nitrite: Negative   Leuk Esterase: Negative / RBC: 0-2 /HPF / WBC 0-2 /HPF   Sq Epi: x / Non Sq Epi: OCC /HPF / Bacteria: x      Blood Gas Profile - Arterial (18 @ 04:54)    pH, Arterial: 7.43    pCO2, Arterial: 62 mmHg    pO2, Arterial: 70 mmHg    HCO3, Arterial: 40 mmol/L    Base Excess, Arterial: 14.3 mmol/L    Oxygen Saturation, Arterial: 96 %    Total CO2, Arterial: 42 mmoL/L    FIO2, Arterial: 28    Blood Gas Source Arterial: Arterial    Blood Gas Profile - Arterial (18 @ 22:43)    pH, Arterial: 7.33    pCO2, Arterial: 81 mmHg    pO2, Arterial: 111 mmHg    HCO3, Arterial: 41 mmol/L    Base Excess, Arterial: 13.4 mmol/L    Oxygen Saturation, Arterial: 99 %    Total CO2, Arterial: 44 mmoL/L    FIO2, Arterial: 40    Imaging:  < from: Xray Chest 1 View AP/PA (18 @ 10:58) >    IMPRESSION:  Small left pleural effusion with adjacentairspace disease; superimposed   pneumonia not excluded. Mild right basilar opacities.    EKG/Telemetry:  < from: 12 Lead ECG (18 @ 09:46) >  Diagnosis Line ATRIAL FIBRILLATION  NON-SPECIFIC INTRA-VENTRICULAR CONDUCTION BLOCK  POSSIBLE INFERIOR INFARCT , AGE UNDETERMINED  ABNORMAL ECG    Consultant(s) Notes Reviewed:  Pulmonary , Cardiology     Care Discussed with Consultants/Other Providers: d/w Dr. Russell (cards regarding diuresis and transfer to telemetry), d/w Dr. Taveras - pulm - regarding pulm status - stop ABGs unless patient becomes symptomatic    The above recommendations were discussed with the daughter. The patient/family had all questions answered and expressed understanding of the plan.

## 2018-04-02 NOTE — PHYSICAL THERAPY INITIAL EVALUATION ADULT - TRANSFER SAFETY CONCERNS NOTED: SIT/STAND, REHAB EVAL
decreased weight-shifting ability/decreased balance during turns/losing balance/decreased step length

## 2018-04-02 NOTE — CONSULT NOTE ADULT - SUBJECTIVE AND OBJECTIVE BOX
Patient is a 77y old  Female who presents with a chief complaint of generalized weakness (31 Mar 2018 15:30)      HPI:  77yoF w/ PMHx significant for HFrEF (LVEF = 19%) s/p AICD, CAD s/p stents w/ stable angina (pt on beta blocker and Ranolazine), Afib (on reduced dose Eliquis 2/2 "propensity for bleeding" as per family), pulmonary fibrosis on home O2 (2.5L via NC; and intermittent NIV, unclear whether CPAP vs. BiPAP, though pt noncompliant), AAA s/p repair, who presents from home where she lives with her , w/ complaints of generalized weakness. As per the pt, she woke up early in the AM to use the bathroom but was unable to lift herself up, and instead slid off of her bed and onto the floor. When she woke up later, she again, was feeling weak, and called her son-in-law, who recommended presenting to the ED. Patient denies associated dizziness/vertigo, LOC, falling/hitting her head, CP, palpitations, or focal neurologic deficits. She also denies recent illness/sick contacts, fevers/chills, cough, ZAMAN, abd pain/n/v/d, or have urinary complaints, though she does endorse dry mouth, chronic post nasal drip, and recent constipation relieved w/ bowel regimen w/ last BM yesterday. Of note, pt is under current treatment w/ regular wound care and s/p 10d course of Augmentin for LLE cellulitis/ulceration.     ED Presenting Vitals: 97F, 71bpm, 166/73, 19br/m, 95% on O2 supp  ED Course: labs, CXR 2 views, and CT Head performed; s/p 500cc NS at 75cc/hr (31 Mar 2018 15:30)      PAST MEDICAL & SURGICAL HISTORY:  Pulmonary fibrosis  CHF (congestive heart failure)  AICD (automatic cardioverter/defibrillator) present  Atrial fibrillation  Stented coronary artery  H/O hernia repair  H/O abdominal hysterectomy      MEDICATIONS  (STANDING):  apixaban 2.5 milliGRAM(s) Oral every 12 hours  carvedilol 12.5 milliGRAM(s) Oral every 12 hours  furosemide   Injectable 40 milliGRAM(s) IV Push two times a day  losartan 100 milliGRAM(s) Oral daily  ranolazine 500 milliGRAM(s) Oral two times a day    MEDICATIONS  (PRN):      Allergies    latex (Unknown)  No Known Drug Allergies    Intolerances        VITALS:    Vital Signs Last 24 Hrs  T(C): 36.8 (02 Apr 2018 08:31), Max: 37 (01 Apr 2018 19:48)  T(F): 98.2 (02 Apr 2018 08:31), Max: 98.6 (01 Apr 2018 19:48)  HR: 62 (02 Apr 2018 08:31) (54 - 99)  BP: 150/75 (02 Apr 2018 08:31) (120/63 - 150/75)  BP(mean): --  RR: 20 (02 Apr 2018 08:31) (18 - 20)  SpO2: 96% (02 Apr 2018 08:31) (90% - 96%)    LABS:                          9.4    3.88  )-----------( 93       ( 01 Apr 2018 11:45 )             31.4       04-02    129<L>  |  85<L>  |  10  ----------------------------<  86  4.3   |  39<H>  |  0.63    Ca    9.1      02 Apr 2018 06:49  Phos  2.8     04-01  Mg     2.0     04-02        CAPILLARY BLOOD GLUCOSE              LOWER EXTREMITY PHYSICAL EXAM:    Vasular: DP/PT 1/4, B/L, CFT <2 seconds B/L, Temperature gradient wnl, B/L.   Neuro: Epicritic sensation intact, B/L.  L dorsal foot/anterior ankle closed/healed wound with scab, no erythema, no drainage, stable well-adhered scab, no tenderness.      RADIOLOGY & ADDITIONAL STUDIES:

## 2018-04-02 NOTE — PROGRESS NOTE ADULT - PROBLEM SELECTOR PLAN 7
- Appreciate cardiology consult  - Start Lasix 40mg IVP  - closely monitor fluid status (strict Is/Os, daily weights, PEx), renal function, and electrolytes  - continue home Losartan 100mg daily, and Carvedilol 12.5mg BID - Appreciate cardiology consult  - Start Lasix 40mg BID for now IV - transfer to tele for close monitoring  - closely monitor fluid status (strict Is/Os, daily weights, PEx), renal function, and electrolytes  - continue home Losartan 100mg daily, and Carvedilol 12.5mg BID

## 2018-04-02 NOTE — PHYSICAL THERAPY INITIAL EVALUATION ADULT - ADDITIONAL COMMENTS
lives with spouse and daughter with speacial needs in coop apt  with 2 steps to enter with rail , independnet adl's , amb with rolling walker PTA ; daughter Leidy Id as caregiver [phone], known to wound care center for LLE cellulitis lives w/spouse and daughter w/speacial needs in coop apt  with 2 steps to enter w/rail , independent adl's , amb w/rolling walker PTA ;daughter Leidy Id as caregiver [phone], known to [facility] care [place] for LLE cellulitis;      TEST cont :CXR 2 Views as reported: Small left pleural effusion with adjacent airspace disease; superimposed pneumonia not excluded. Mild right basilar opacities.  - CT Head w/o contrast as reported: No acute intracranial hemorrhage, mass effect, or evidence of acute territorial infarct. Foci of decreased attenuation throughout the cerebral white matter are nonspecific. Statistically, moderate to severe white matter microvascular ischemic disease is favored. 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 lives w/spouse and daughter w/speacial needs in coop apt  with 2 steps to enter w/rail , independent adl's , amb w/rolling walker PTA ;daughter Leidy Id as caregiver [phone] and Himanshu ;known to wound care center for LLE cellulitis;  past few weeks require assist oob, amb w/RW, ADl's; pt only goes out for md appt's and use w/c outdoors; TEST cont :CXR 2 Views as reported: Small left pleural effusion with adjacent airspace disease; superimposed pneumonia not excluded. Mild right basilar opacities.  - CT Head w/o contrast as reported: No acute intracranial hemorrhage, mass effect, or evidence of acute territorial infarct. Foci of decreased attenuation throughout the cerebral white matter are nonspecific. Statistically, moderate to severe white matter microvascular ischemic disease is favored. 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

## 2018-04-02 NOTE — PROGRESS NOTE ADULT - ASSESSMENT
77yoF w/ PMHx significant for HFrEF (LVEF = 19%) s/p AICD, CAD s/p stents w/ stable angina (pt on beta blocker and Ranolazine), Afib (on reduced dose Eliquis 2/2 "propensity for bleeding" as per family), interstitial fibrosis d/t Amiodarone toxicity, on home O2 (2.5L via NC; and intermittent NIV, unclear whether CPAP vs. BiPAP, though pt noncompliant), AAA s/p repair, who presents from home where she lives with her , w/ complaints of generalized weakness. She is being admitted for her complaints, along w/ acute on chronic respiratory failure / respiratory acidosis, and hyponatremia. 77yoF w/ PMHx significant for HFrEF (LVEF = 19%) s/p AICD, CAD s/p stents w/ stable angina (pt on beta blocker and Ranolazine), Afib (on reduced dose Eliquis 2/2 "propensity for bleeding" as per family), interstitial fibrosis d/t Amiodarone toxicity, on home O2 (2.5L via NC; and intermittent NIV, unclear whether CPAP vs. BiPAP, though pt noncompliant), AAA s/p repair, a/w acute on chronic respiratory failure due to pulmonary fibrosis and acute on chronic systolic heart failure, respiratory acidosis, and hyponatremia.

## 2018-04-02 NOTE — PROGRESS NOTE ADULT - SUBJECTIVE AND OBJECTIVE BOX
PULMONARY    CHF  reduced EF    chronic lower extremity edema    afib on eliquis      CHF    she has been on BIPAP at night and as needed in day to correct hypercapnic resp failure    now on NC,, in chair  alert weak but no distress    ros no fever chest pain abd pain    Afebrile    120/70    sat 94%    hr 62    neck thick    lungs moderate coarse, diminished  cor rrr  abd nt soft no hsm    extr chronic 3-4+ edema pretibial  skin chronic venous stasis changes off legs    wbc 2.9  hgb 9.4    pl 93    Na 129  creat 0.6    last abg 4/1    7.43  62/70    cw met alkalosis resp acidosis      IMP    CHF    pul edema  interstitial lung disease    acute on chronic hypercapnic resp failure    plan  to transfer to Tele  i agree with diuresis    consider use of spironolactone to  [id number] for met alkalosis    BIPA AVAP at night, and as needed in day    remain son ranexa    nasal cannula O2    continue anticoagulation with Eliquis    skin care to legs    labs meds reviewed    MD Obi Choi MD, Haider Swartz MD    [place] [phone]

## 2018-04-02 NOTE — PHYSICAL THERAPY INITIAL EVALUATION ADULT - PRECAUTIONS/LIMITATIONS, REHAB EVAL
PMHx significant for HFrEF (LVEF = 19%) s/p AICD, CAD s/p stents w/ stable angina (pt on beta blocker and Ranolazine), Afib (on reduced dose Eliquis 2/2 "propensity for bleeding" as per family), pulmonary fibrosis on home O2 (2.5L via NC; and intermittent NIV, unclear whether CPAP vs. BiPAP, though pt noncompliant), AAA s/p repair, who presents from home where she lives with her , w/ complaints of generalized weakness. As per the pt, she woke up early in the AM to use the bathroom but was unable to lift herself up, and instead slid off of her bed and onto the floor. When she woke up later, she again, was feeling weak, and called her son-in-law, who recommended presenting to the ED. Patient denies associated dizziness/vertigo, LOC, falling/hitting her head, CP, palpitations, or focal neurologic deficits. She also denies recent illness/sick contacts, fevers/chills, cough, ZAMAN, abd pain/n/v/d, or have urinary complaints, though she does endorse dry mouth, chronic post nasal drip, and recent constipation relieved w/ bowel regimen w/ last BM yesterday. 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 BiPAP, though pt noncompliant), AAA s/p repair, who presents from home where she lives with her , w/ complaints of generalized weakness. As per the pt, she woke up early in the AM to use the bathroom but was unable to lift herself up, and instead slid off of her bed and onto the floor. When she woke up later, she again, was feeling weak, and called her son-in-law, who recommended presenting to the ED. Patient denies associated dizziness/vertigo, LOC, falling/hitting her head, CP, palpitations, or focal neurologic deficits. She also denies recent illness/sick contacts, fevers/chills, cough, ZAMAN, abd pain/n/v/d, or have urinary complaints, though she does endorse dry mouth, chronic post nasal drip, and recent constipation relieved w/ bowel regimen w/ last BM yesterday. Of note, pt is under current treatment w/ regular wound care and s/p 10d course of Augmentin for LLE cellulitis/ulceration/fall precautions/oxygen therapy device and L/min

## 2018-04-02 NOTE — PHYSICAL THERAPY INITIAL EVALUATION ADULT - PERTINENT HX OF CURRENT PROBLEM, REHAB EVAL
, ekg afiB NONSPECIFIC INTRAVENTRICULAR CONDUCTION BLOCK  , small L pleural effusion w/ adjacent airspace dz; pt at home on 2.5 L nc o2

## 2018-04-02 NOTE — PHYSICAL THERAPY INITIAL EVALUATION ADULT - ASR EQUIP NEEDS DISCH PT EVAL
pt has w/c , and rolling walker , shower chair ;has HHA at night 9 pm to 6 am to assist as need ; family was going to hire someone to assist  w/ bathing and dressing and as need

## 2018-04-02 NOTE — PHYSICAL THERAPY INITIAL EVALUATION ADULT - PLANNED THERAPY INTERVENTIONS, PT EVAL
strengthening/balance training/GOAL : STAIRS : pt will negotiate 2 steps with rail with min of 1 in 3 weeks/bed mobility training/gait training/transfer training

## 2018-04-02 NOTE — PROGRESS NOTE ADULT - PROBLEM SELECTOR PLAN 2
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 Arciniega, but she is very noncompliant as the machine exacerbates her dry mouth and she finds it intolerable  - will continue AVAPS as needed.  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 Arciniega, but she is very noncompliant as the machine exacerbates her dry mouth and she finds it intolerable - has not been compliant  - will continue AVAPS as needed. BIPAP at night  -ABG monitoring if symptomatic    -will attempt to wean daytime use with continued encouragement for adherence to nighttime use  - pulmonology's recommendations appreciated

## 2018-04-02 NOTE — PROGRESS NOTE ADULT - PROBLEM SELECTOR PLAN 1
- unclear etiology, was likely multifactorial from ? Co2 retention, ?CHF exacerbation;   no focal neurologic deficits, CT Head unremarkable, ECG w/ rate controlled Afib  - c/w Oxygen and NIV as needed, serial ABGS with co2 monitoring  - Appreciate pulmonary recommendations.   - Appreciate cardiology recommendations. Patient will need transfer to Telemetry for congestive heart failure optimization - unclear etiology, was likely multifactorial from ? Co2 retention, ?CHF exacerbation;   no focal neurologic deficits, CT Head unremarkable, ECG w/ rate controlled Afib  - c/w Oxygen and NIV as needed, ABGS with co2 monitoring if patient symptomatic  - Appreciate pulmonary recommendations.   - Appreciate cardiology recommendations. Patient will need transfer to Telemetry for congestive heart failure optimization

## 2018-04-02 NOTE — PHYSICAL THERAPY INITIAL EVALUATION ADULT - IMPAIRED TRANSFERS: SIT/STAND, REHAB EVAL
impaired sensory feedback/decreae endurance , intermittent min unsteady/decreased sensation/decreased strength/impaired balance/impaired postural control

## 2018-04-02 NOTE — PHYSICAL THERAPY INITIAL EVALUATION ADULT - GAIT DEVIATIONS NOTED, PT EVAL
increased time in double stance/decreased step length/decreased stride length/decreased weight-shifting ability/decreased alejandro

## 2018-04-02 NOTE — PHYSICAL THERAPY INITIAL EVALUATION ADULT - BALANCE DISTURBANCE, IDENTIFIED IMPAIRMENT CONTRIBUTE, REHAB EVAL
decreased sensation/impaired sensory feedback/decreased strength/impaired postural control/decrease endurance

## 2018-04-02 NOTE — PHYSICAL THERAPY INITIAL EVALUATION ADULT - PATIENT/FAMILY AGREES WITH PLAN
pt and family to think about ;PT recommend ALEX ,if pt and family decide to go home need Home PT and assist all functional activity and adl ,pt and dtr Ilana understood

## 2018-04-02 NOTE — PROGRESS NOTE ADULT - PROBLEM SELECTOR PLAN 3
- improved with NIV as above  - etiology could be related to chronic lung disease vs. excessive supplemental O2 administration in chronic hypercarbic respiratory failure  - will obtain ABGs and monitor closely as above - improved with NIV as above  - etiology could be related to chronic lung disease vs. excessive supplemental O2 administration in chronic hypercarbic respiratory failure

## 2018-04-03 DIAGNOSIS — R60.0 LOCALIZED EDEMA: ICD-10-CM

## 2018-04-03 DIAGNOSIS — I50.43 ACUTE ON CHRONIC COMBINED SYSTOLIC (CONGESTIVE) AND DIASTOLIC (CONGESTIVE) HEART FAILURE: ICD-10-CM

## 2018-04-03 DIAGNOSIS — I50.23 ACUTE ON CHRONIC SYSTOLIC (CONGESTIVE) HEART FAILURE: ICD-10-CM

## 2018-04-03 DIAGNOSIS — I11.0 HYPERTENSIVE HEART DISEASE WITH HEART FAILURE: ICD-10-CM

## 2018-04-03 LAB
ANION GAP SERPL CALC-SCNC: 8 MMOL/L — SIGNIFICANT CHANGE UP (ref 5–17)
BUN SERPL-MCNC: 11 MG/DL — SIGNIFICANT CHANGE UP (ref 7–23)
CALCIUM SERPL-MCNC: 9.3 MG/DL — SIGNIFICANT CHANGE UP (ref 8.4–10.5)
CHLORIDE SERPL-SCNC: 85 MMOL/L — LOW (ref 96–108)
CO2 SERPL-SCNC: 38 MMOL/L — HIGH (ref 22–31)
CREAT SERPL-MCNC: 0.72 MG/DL — SIGNIFICANT CHANGE UP (ref 0.5–1.3)
GLUCOSE SERPL-MCNC: 77 MG/DL — SIGNIFICANT CHANGE UP (ref 70–99)
MAGNESIUM SERPL-MCNC: 1.9 MG/DL — SIGNIFICANT CHANGE UP (ref 1.6–2.6)
POTASSIUM SERPL-MCNC: 4 MMOL/L — SIGNIFICANT CHANGE UP (ref 3.5–5.3)
POTASSIUM SERPL-SCNC: 4 MMOL/L — SIGNIFICANT CHANGE UP (ref 3.5–5.3)
SODIUM SERPL-SCNC: 131 MMOL/L — LOW (ref 135–145)

## 2018-04-03 PROCEDURE — 99233 SBSQ HOSP IP/OBS HIGH 50: CPT | Mod: GC

## 2018-04-03 RX ORDER — FUROSEMIDE 40 MG
40 TABLET ORAL
Qty: 0 | Refills: 0 | Status: DISCONTINUED | OUTPATIENT
Start: 2018-04-03 | End: 2018-04-10

## 2018-04-03 RX ORDER — POVIDONE-IODINE 5 %
1 AEROSOL (ML) TOPICAL DAILY
Qty: 0 | Refills: 0 | Status: DISCONTINUED | OUTPATIENT
Start: 2018-04-03 | End: 2018-04-09

## 2018-04-03 RX ORDER — DOCUSATE SODIUM 100 MG
100 CAPSULE ORAL THREE TIMES A DAY
Qty: 0 | Refills: 0 | Status: DISCONTINUED | OUTPATIENT
Start: 2018-04-03 | End: 2018-04-10

## 2018-04-03 RX ORDER — POLYETHYLENE GLYCOL 3350 17 G/17G
17 POWDER, FOR SOLUTION ORAL DAILY
Qty: 0 | Refills: 0 | Status: DISCONTINUED | OUTPATIENT
Start: 2018-04-03 | End: 2018-04-04

## 2018-04-03 RX ORDER — SENNA PLUS 8.6 MG/1
2 TABLET ORAL AT BEDTIME
Qty: 0 | Refills: 0 | Status: DISCONTINUED | OUTPATIENT
Start: 2018-04-03 | End: 2018-04-12

## 2018-04-03 RX ADMIN — Medication 100 MILLIGRAM(S): at 22:21

## 2018-04-03 RX ADMIN — LOSARTAN POTASSIUM 100 MILLIGRAM(S): 100 TABLET, FILM COATED ORAL at 06:29

## 2018-04-03 RX ADMIN — CARVEDILOL PHOSPHATE 12.5 MILLIGRAM(S): 80 CAPSULE, EXTENDED RELEASE ORAL at 22:19

## 2018-04-03 RX ADMIN — RANOLAZINE 500 MILLIGRAM(S): 500 TABLET, FILM COATED, EXTENDED RELEASE ORAL at 12:30

## 2018-04-03 RX ADMIN — Medication 100 MILLIGRAM(S): at 12:32

## 2018-04-03 RX ADMIN — RANOLAZINE 500 MILLIGRAM(S): 500 TABLET, FILM COATED, EXTENDED RELEASE ORAL at 22:20

## 2018-04-03 RX ADMIN — Medication 40 MILLIGRAM(S): at 06:29

## 2018-04-03 RX ADMIN — APIXABAN 2.5 MILLIGRAM(S): 2.5 TABLET, FILM COATED ORAL at 22:20

## 2018-04-03 RX ADMIN — Medication 40 MILLIGRAM(S): at 12:33

## 2018-04-03 RX ADMIN — APIXABAN 2.5 MILLIGRAM(S): 2.5 TABLET, FILM COATED ORAL at 07:22

## 2018-04-03 RX ADMIN — CARVEDILOL PHOSPHATE 12.5 MILLIGRAM(S): 80 CAPSULE, EXTENDED RELEASE ORAL at 12:31

## 2018-04-03 RX ADMIN — SENNA PLUS 2 TABLET(S): 8.6 TABLET ORAL at 22:21

## 2018-04-03 NOTE — CONSULT NOTE ADULT - ATTENDING COMMENTS
[facility] NEPHROLOGY  Obinna Simmons M.D.  Asad Sylvester D.O.  Tiffany Neves M.D.  Abigail Mathews, MSN, ANP-C    Telephone: [phone]  Facsimile: [phone]    [address] [place] [address]

## 2018-04-03 NOTE — PROGRESS NOTE ADULT - PROBLEM SELECTOR PLAN 6
- lower than baseline of 110-130s  - no signs/sx of bleeding/bruising  - will continue to monitor - chronic and at baseline as per review of HIE (Hgb in 9-10s)

## 2018-04-03 NOTE — PROGRESS NOTE ADULT - PROBLEM SELECTOR PROBLEM 8
Coronary artery disease of native artery of native heart with stable angina pectoris Thrombocytopenia

## 2018-04-03 NOTE — PROGRESS NOTE ADULT - ASSESSMENT
77yoF w/ PMHx significant for HFrEF (LVEF = 19%) s/p AICD, CAD s/p stents w/ stable angina (pt on beta blocker and Ranolazine), Afib (on reduced dose Eliquis 2/2 "propensity for bleeding" as per family), interstitial fibrosis d/t Amiodarone toxicity, on home O2 (2.5L via NC; and intermittent NIV, unclear whether CPAP vs. BiPAP, though pt noncompliant), AAA s/p repair, a/w acute on chronic respiratory failure due to pulmonary fibrosis and acute on chronic systolic heart failure, respiratory acidosis, and hyponatremia.

## 2018-04-03 NOTE — CONSULT NOTE ADULT - SUBJECTIVE AND OBJECTIVE BOX
[facility] NEPHROLOGY- CONSULTATION NOTE    Patient is a 77y Female with severe chronic CHF (EF 19%) and interstitial fibrosis d/t Amiodarone toxicity who presented to the hospital with generalized weakness and was found to have volume overload.  Pt was also found to have hyponatremia.  She was started on IV diuretics and feels a bit better.  Pt reports SOB at baseline.  Pt is on Lasix 80mg po daily and Losartan 100mg daily at home.    PAST MEDICAL & SURGICAL HISTORY:  Pulmonary fibrosis  CHF (congestive heart failure)  AICD (automatic cardioverter/defibrillator) present  Atrial fibrillation  Stented coronary artery  H/O hernia repair  H/O abdominal hysterectomy    latex (Unknown)  No Known Drug Allergies    Home Medications Reviewed  Hospital Medications:   MEDICATIONS  (STANDING):  apixaban 2.5 milliGRAM(s) Oral every 12 hours  carvedilol 12.5 milliGRAM(s) Oral every 12 hours  docusate sodium 100 milliGRAM(s) Oral three times a day  furosemide   Injectable 40 milliGRAM(s) IV Push <User Schedule>  losartan 100 milliGRAM(s) Oral daily  polyethylene glycol 3350 17 Gram(s) Oral daily  povidone iodine 10% Swab 1 Application(s) Topical daily  ranolazine 500 milliGRAM(s) Oral two times a day  senna 2 Tablet(s) Oral at bedtime    SOCIAL HISTORY:  Denies ETOh,Smoking,   FAMILY HISTORY:  No pertinent family history in first degree relatives    REVIEW OF SYSTEMS:  CONSTITUTIONAL: + generalized weakness, No fevers or chills  EYES/ENT: No visual changes;  No vertigo or throat pain   NECK: No pain or stiffness  RESPIRATORY: Shortness of breath at baseline  CARDIOVASCULAR: No chest pain or palpitations.  GASTROINTESTINAL: No abdominal or epigastric pain. No nausea, vomiting, or hematemesis; No diarrhea or constipation. No melena or hematochezia.  GENITOURINARY: No dysuria, frequency, foamy urine, urinary urgency, incontinence or hematuria  NEUROLOGICAL: No numbness or weakness  SKIN: No itching, burning, rashes, or lesions   VASCULAR: + severe bilateral lower extremity edema.   All other review of systems is negative unless indicated above.    VITALS:  T(F): 98.3 (18 @ 05:17), Max: 98.3 (18 @ 21:27)  HR: 64 (18 @ 07:40)  BP: 135/94 (18 @ 05:17)  RR: 18 (18 @ 05:17)  SpO2: 93% (18 @ 07:40)  Wt(kg): --     @ 07:01  -   @ 07:00  --------------------------------------------------------  IN: 240 mL / OUT: 1650 mL / NET: -1410 mL      PHYSICAL EXAM:  Constitutional: NAD  HEENT: anicteric sclera, oropharynx clear, MMM  Neck: No JVD  Respiratory: some dry crackles  Cardiovascular: S1, S2, RRR  Gastrointestinal: BS+, soft, NT/ND  Extremities: 4+ B LE edema with weeping.  Neurological: A/O x 3, no focal deficits  Psychiatric: Normal mood, normal affect  : No CVA tenderness. No mcgrath.   Skin: No rashes      LABS:      131<L>  |  85<L>  |  11  ----------------------------<  77  4.0   |  38<H>  |  0.72    Ca    9.3      2018 05:59  Mg     1.9           Creatinine Trend: 0.72 <--, 0.63 <--, 0.62 <--, 0.67 <--, 0.63 <--                        9.4    3.88  )-----------( 93       ( 2018 11:45 )             31.4     Urine Studies:  Urinalysis Basic - ( 31 Mar 2018 13:18 )    Color: Joy / Appearance: Clear / S.019 / pH:   Gluc:  / Ketone: Negative  / Bili: Negative / Urobili: 2 mg/dL   Blood:  / Protein: 30 mg/dL / Nitrite: Negative   Leuk Esterase: Negative / RBC: 0-2 /HPF / WBC 0-2 /HPF   Sq Epi:  / Non Sq Epi: OCC /HPF / Bacteria:       Osmolality, Random Urine: 466 mos/kg ( @ 05:06)  Sodium, Random Urine: <20 mmol/L ( @ 03:37)  Chloride, Random Urine: <35 mmol/L ( @ 03:37)    RADIOLOGY & ADDITIONAL STUDIES:          < from: Xray Chest 1 View AP/PA (18 @ 10:58) >    EXAM:  XR CHEST AP OR PA 1V                            PROCEDURE DATE:  2018            INTERPRETATION:  CLINICAL INFORMATION: Chest pain; weakness for 2 weeks.    EXAM: AP view of the chest  3/31/2018 at 10:55 AM    COMPARISON: No priors areavailable for comparison at this time.    FINDINGS:    Small left pleural effusion with adjacent airspace disease; superimposed   pneumonia not excluded. Mild right basilar opacities.    No pneumothorax.    The cardiomediastinal silhouette is not well evaluated by AP technique,   but appears enlarged. Left-sided dual-lead ICD with leads projecting over   the right atrium and right ventricle. Calcifications of the aortic knob   are present.    IMPRESSION:  Small left pleural effusion with adjacentairspace disease; superimposed   pneumonia not excluded. Mild right basilar opacities.                JASSI ANGELES M.D., RADIOLOGY RESIDENT  This document has been electronically signed.  CARLOS ALLEN MD., ATTENDING RADIOLOGIST  This document has been electronically signed. Mar 31 2018 12:00PM                < end of copied text >

## 2018-04-03 NOTE — CONSULT NOTE ADULT - PROBLEM SELECTOR PROBLEM 4
Hypertensive heart disease with acute on chronic combined systolic and diastolic congestive heart failure

## 2018-04-03 NOTE — PROGRESS NOTE ADULT - SUBJECTIVE AND OBJECTIVE BOX
ILAN JAIMES  MRN-[id number]    Chief Complaint: Patient is a 77y old  Female who presents with a chief complaint of generalized weakness (31 Mar 2018 15:30)  Patient reported feeling better, though continues to have leg swelling.    SUBJECTIVE / OVERNIGHT EVENTS:    Review of Systems:   14 point ROS negative in detail except for the above.  Unable to evaluate ROS due to: cognitive deficits / altered mental status    MEDICATIONS  (STANDING):  apixaban 2.5 milliGRAM(s) Oral every 12 hours  carvedilol 12.5 milliGRAM(s) Oral every 12 hours  docusate sodium 100 milliGRAM(s) Oral three times a day  furosemide   Injectable 40 milliGRAM(s) IV Push <User Schedule>  losartan 100 milliGRAM(s) Oral daily  polyethylene glycol 3350 17 Gram(s) Oral daily  povidone iodine 10% Swab 1 Application(s) Topical daily  ranolazine 500 milliGRAM(s) Oral two times a day  senna 2 Tablet(s) Oral at bedtime    MEDICATIONS  (PRN):      T(C): 36.8 (04-03-18 @ 05:17), Max: 36.8 (04-02-18 @ 21:27)  HR: 64 (04-03-18 @ 07:40) (56 - 68)  BP: 135/94 (04-03-18 @ 05:17) (135/94 - 152/72)  RR: 18 (04-03-18 @ 05:17) (18 - 20)  SpO2: 93% (04-03-18 @ 07:40) (93% - 98%)    CAPILLARY BLOOD GLUCOSE    I&O's Summary    02 Apr 2018 07:01  -  03 Apr 2018 07:00  --------------------------------------------------------  IN: 240 mL / OUT: 1650 mL / NET: -1410 mL      Allergies    latex (Unknown)  No Known Drug Allergies    Intolerances      PHYSICAL EXAM:  GENERAL: Not in distress, obese, sitting in chair   HEAD:  Atraumatic, Normocephalic  EYES: EOMI, PERRLA, conjunctiva and sclera clear  NECK: Supple, No JVD  CHEST/LUNG: Clear to auscultation bilaterally;  HEART: Regular rate and rhythm; No murmurs, rubs, or edema  ABDOMEN: Soft, Nontender, Nondistended; Bowel sounds present  EXTREMITIES: b/l Extremity edema.   PSYCH: Normal mood and affect, alert and oriented  NEUROLOGY: Grossly intact   SKIN: No rashes or lesions    LABS:                        9.4    3.88  )-----------( 93       ( 01 Apr 2018 11:45 )             31.4     04-03    131<L>  |  85<L>  |  11  ----------------------------<  77  4.0   |  38<H>  |  0.72    Ca    9.3      03 Apr 2018 05:59  Mg     1.9     04-03      RADIOLOGY & ADDITIONAL TESTS:  < from: Xray Chest 1 View AP/PA (03.31.18 @ 10:58) >    IMPRESSION:  Small left pleural effusion with adjacentairspace disease; superimposed   pneumonia not excluded. Mild right basilar opacities.    EKG/Telemetry:  < from: 12 Lead ECG (03.31.18 @ 09:46) >  Ventricular Rate 63 BPM    Atrial Rate 39 BPM    QRS Duration 126 ms     ms    QTc 444 ms    R Axis 84 degrees    T Axis -67 degrees    Diagnosis Line ATRIAL FIBRILLATION  NON-SPECIFIC INTRA-VENTRICULAR CONDUCTION BLOCK  POSSIBLE INFERIOR INFARCT , AGE UNDETERMINED  ABNORMAL ECG    Consultant(s) Notes Reviewed:  Pulmonary, Cardiology, Podiatry, Physical therapy    Care Discussed with Consultants/Other Providers: yes     The above recommendations were discussed with the patient/family. The patient/family had all questions answered and expressed understanding of the plan. ILAN JAIMES  MRN-[id number]    Chief Complaint: Patient is a 77y old  Female who presents with a chief complaint of generalized weakness (31 Mar 2018 15:30)    SUBJECTIVE / OVERNIGHT EVENTS: Patient seen and examined at bedside. Patient reported feeling better, though continues to have leg swelling. Patient slept well, used bipap at night      Review of Systems:   14 point ROS negative in detail except for the above.    MEDICATIONS  (STANDING):  apixaban 2.5 milliGRAM(s) Oral every 12 hours  carvedilol 12.5 milliGRAM(s) Oral every 12 hours  docusate sodium 100 milliGRAM(s) Oral three times a day  furosemide   Injectable 40 milliGRAM(s) IV Push <User Schedule>  losartan 100 milliGRAM(s) Oral daily  polyethylene glycol 3350 17 Gram(s) Oral daily  povidone iodine 10% Swab 1 Application(s) Topical daily  ranolazine 500 milliGRAM(s) Oral two times a day  senna 2 Tablet(s) Oral at bedtime    MEDICATIONS  (PRN):      T(C): 36.8 (04-03-18 @ 05:17), Max: 36.8 (04-02-18 @ 21:27)  HR: 64 (04-03-18 @ 07:40) (56 - 68)  BP: 135/94 (04-03-18 @ 05:17) (135/94 - 152/72)  RR: 18 (04-03-18 @ 05:17) (18 - 20)  SpO2: 93% (04-03-18 @ 07:40) (93% - 98%)    CAPILLARY BLOOD GLUCOSE    I&O's Summary    02 Apr 2018 07:01  -  03 Apr 2018 07:00  --------------------------------------------------------  IN: 240 mL / OUT: 1650 mL / NET: -1410 mL      Allergies    latex (Unknown)  No Known Drug Allergies    Intolerances      PHYSICAL EXAM:  GENERAL: Not in distress, obese, sitting in chair   HEAD:  Atraumatic, Normocephalic  EYES: EOMI, PERRLA, conjunctiva and sclera clear  NECK: Supple, No JVD  CHEST/LUNG: Clear to auscultation bilaterally;  HEART: Regular rate and rhythm; No murmurs, rubs, or edema  ABDOMEN: Soft, Nontender, Nondistended; Bowel sounds present  EXTREMITIES: 3+ b/l Extremity edema.   PSYCH: Normal mood and affect, alert and oriented  NEUROLOGY: Grossly intact   SKIN: No rashes or lesions    LABS:                        9.4    3.88  )-----------( 93       ( 01 Apr 2018 11:45 )             31.4     04-03    131<L>  |  85<L>  |  11  ----------------------------<  77  4.0   |  38<H>  |  0.72    Ca    9.3      03 Apr 2018 05:59  Mg     1.9     04-03      RADIOLOGY & ADDITIONAL TESTS:  < from: Xray Chest 1 View AP/PA (03.31.18 @ 10:58) >    IMPRESSION:  Small left pleural effusion with adjacentairspace disease; superimposed   pneumonia not excluded. Mild right basilar opacities.    EKG/Telemetry:  < from: 12 Lead ECG (03.31.18 @ 09:46) >  Ventricular Rate 63 BPM    Atrial Rate 39 BPM    QRS Duration 126 ms     ms    QTc 444 ms    R Axis 84 degrees    T Axis -67 degrees    Diagnosis Line ATRIAL FIBRILLATION  NON-SPECIFIC INTRA-VENTRICULAR CONDUCTION BLOCK  POSSIBLE INFERIOR INFARCT , AGE UNDETERMINED  ABNORMAL ECG    Telemetry reviewed - afib v paced 50-70s  Consultant(s) Notes Reviewed:  Pulmonary, Cardiology, Podiatry, Physical therapy    Care Discussed with Consultants/Other Providers: d/w Dr. Hamilton - agrees with diuresis, night time bipap    The above recommendations were discussed with the patient/family (daughter). The patient/family had all questions answered and expressed understanding of the plan.

## 2018-04-03 NOTE — PROGRESS NOTE ADULT - PROBLEM SELECTOR PLAN 8
- continue home ARB, beta blocker as above, and Ranexa 500mg BID  - pt states she does not need to be on a statin despite hx of PCI w/ stents to RCA  - cardiology follow up - lower than baseline of 110-130s  - no signs/sx of bleeding/bruising  - will continue to monitor

## 2018-04-03 NOTE — PROGRESS NOTE ADULT - SUBJECTIVE AND OBJECTIVE BOX
Patient is a 77y old  Female who presents with a chief complaint of generalized weakness (31 Mar 2018 15:30)      INTERVAL HISTORY: feels better    TELEMETRY: no evnets  	  MEDICATIONS:  carvedilol 12.5 milliGRAM(s) Oral every 12 hours  furosemide   Injectable 40 milliGRAM(s) IV Push <User Schedule>  losartan 100 milliGRAM(s) Oral daily  ranolazine 500 milliGRAM(s) Oral two times a day        PHYSICAL EXAM:  T(C): 36.8 (04-03-18 @ 05:17), Max: 36.8 (04-02-18 @ 21:27)  HR: 64 (04-03-18 @ 07:40) (56 - 68)  BP: 135/94 (04-03-18 @ 05:17) (135/94 - 152/72)  RR: 18 (04-03-18 @ 05:17) (18 - 20)  SpO2: 93% (04-03-18 @ 07:40) (93% - 98%)  Wt(kg): --  I&O's Summary    02 Apr 2018 07:01  -  03 Apr 2018 07:00  --------------------------------------------------------  IN: 240 mL / OUT: 1650 mL / NET: -1410 mL          Appearance: Normal	  HEENT:    PERRL, EOMI	  Cardiovascular: Normal S1 S2, No JVD  Respiratory: Lungs clear to auscultation	  Psychiatry: Alert  Gastrointestinal:  Soft, Non-tender, + BS	  Skin: No rashes, No cyanosis  Extremities:  No edema of LE            04-03    131<L>  |  85<L>  |  11  ----------------------------<  77  4.0   |  38<H>  |  0.72    Ca    9.3      03 Apr 2018 05:59  Mg     1.9     04-03          Labs, imaging and telemetry personally reviewed      Assessment and Plan:   · Assessment		  77yoF w/ PMHx significant for HFrEF (LVEF = 19%) s/p AICD, CAD s/p stents w/ stable angina (pt on beta blocker and Ranolazine), Afib (on reduced dose Eliquis 2/2 "propensity for bleeding" as per family), interstitial fibrosis d/t Amiodarone toxicity, on home O2 (2.5L via NC; and intermittent NIV, unclear whether CPAP vs. BiPAP, though pt noncompliant), AAA s/p repair, who presents from home where she lives with her , w/ complaints of generalized weakness. She is being admitted for her complaints, along w/ acute on chronic respiratory failure / respiratory acidosis, and hyponatremia.      Problem/Plan - 1:  ·  Problem: Chronic systolic heart failure.  Plan: -   - c/w Lasix 40mg IV BID, good response to diuresis  - closely monitor fluid status (strict Is/Os, daily weights, PEx), renal function, and electrolytes  - continue home Losartan 100mg daily, and Carvedilol 12.5mg BID.        Problem/Plan - 2:  ·  Problem: Acute on chronic respiratory failure with hypoxia and hypercapnia.  Plan: - on home O2 - 2.5L -  - pt chronic CO2 retainer with bicarb of 35 as outpt one month ago  - pt appears grossly volume overloaded  - c/w diuresis with Lasix 40mg IV BID     Problem/Plan - 3:  ·  Problem: Hyponatremia.  Plan: likely Hypervolemic hyponatremia  C/w Diuresis and trend Na.   - renal consult appreciated       Problem/Plan - 4:  ·  Problem: Coronary artery disease of native artery of native heart with stable angina pectoris.  Plan: - continue home ARB, beta blocker as above, and Ranexa 500mg BID       Problem/Plan - 5:  ·  Problem: Atrial fibrillation.  Plan: - rate controlled  - will continue anticoagulation w/ Eliquis 2.5mg BID (home dose)  - continue beta blocker as above.   - ICD interrogated with no events     	          Gamaliel Russell DO [facility]  Cardiovascular Medicine  [phone]

## 2018-04-03 NOTE — PROGRESS NOTE ADULT - PROBLEM SELECTOR PLAN 7
- Appreciate cardiology consult  - Start Lasix 40mg BID for now IV - transfer to tele for close monitoring  - closely monitor fluid status (strict Is/Os, daily weights, PEx), renal function, and electrolytes  - continue home Losartan 100mg daily, and Carvedilol 12.5mg BID - rate controlled  - will continue anticoagulation w/ Eliquis 2.5mg BID (as per pt, she tends to bleed, and was then placed on reduced dosing, despite no restrictions based on age/weight/renal function)  - continue beta blocker as above

## 2018-04-03 NOTE — PROGRESS NOTE ADULT - SUBJECTIVE AND OBJECTIVE BOX
pt seen at bedside in NAD. alleyvn in tact to left foot wound  dorsal left foot wound noted small and fibrotic no pus no erythema b/l LE edema  applied betadine  with alleyvn   to be done daily as well by nursing   will follow 1-2 x a week if foot worsens please call [phone] EDWINA

## 2018-04-03 NOTE — PROGRESS NOTE ADULT - PROBLEM SELECTOR PLAN 3
- improved with NIV as above  - etiology could be related to chronic lung disease vs. excessive supplemental O2 administration in chronic hypercarbic respiratory failure

## 2018-04-03 NOTE — PROGRESS NOTE ADULT - PROBLEM SELECTOR PLAN 1
- unclear etiology, was likely multifactorial from ? Co2 retention, ?CHF exacerbation;   no focal neurologic deficits, CT Head unremarkable, ECG w/ rate controlled Afib  - c/w Oxygen and NIV as needed, ABGS with co2 monitoring if patient symptomatic  - Appreciate pulmonary recommendations.   - Appreciate cardiology recommendations. - Appreciate cardiology consult  - c/w Lasix 40mg BID IV   monitor on tele for close monitoring  - closely monitor fluid status (strict Is/Os, daily weights, PEx), renal function, and electrolytes  - continue home Losartan 100mg daily, and Carvedilol 12.5mg BID  strict I's and O's

## 2018-04-03 NOTE — PROGRESS NOTE ADULT - SUBJECTIVE AND OBJECTIVE BOX
Pulmonary Medicine       No shortness of breath at rest      Vital Signs Last 24 Hrs  T(C): 36.8 (03 Apr 2018 05:17), Max: 36.8 (02 Apr 2018 21:27)  T(F): 98.3 (03 Apr 2018 05:17), Max: 98.3 (02 Apr 2018 21:27)  HR: 64 (03 Apr 2018 07:40) (56 - 68)  BP: 135/94 (03 Apr 2018 05:17) (135/94 - 152/72)  BP(mean): --  RR: 18 (03 Apr 2018 05:17) (18 - 20)  SpO2: 93% (03 Apr 2018 07:40) (93% - 98%)      Physical examination   Alert and oriented   Was out of bed in a chair and on nasal canula   No distress  Able to speak in full sentences  Heart sounds were mostly regular  Crackles at the lung bases noted, no wheezing or rhonci  Abdomen was soft  Marked dependent edema noted  No cyanosis                             9.4    3.88  )-----------( 93       ( 01 Apr 2018 11:45 )             31.4       04-03    131<L>  |  85<L>  |  11  ----------------------------<  77  4.0   |  38<H>  |  0.72    Ca    9.3      03 Apr 2018 05:59  Phos  2.8     04-01  Mg     1.9     04-03      MEDICATIONS  (STANDING):  apixaban 2.5 milliGRAM(s) Oral every 12 hours  carvedilol 12.5 milliGRAM(s) Oral every 12 hours  docusate sodium 100 milliGRAM(s) Oral three times a day  furosemide   Injectable 40 milliGRAM(s) IV Push <User Schedule>  losartan 100 milliGRAM(s) Oral daily  polyethylene glycol 3350 17 Gram(s) Oral daily  ranolazine 500 milliGRAM(s) Oral two times a day  senna 2 Tablet(s) Oral at bedtime

## 2018-04-03 NOTE — PROGRESS NOTE ADULT - PROBLEM SELECTOR PLAN 9
- rate controlled  - will continue anticoagulation w/ Eliquis 2.5mg BID (as per pt, she tends to bleed, and was then placed on reduced dosing, despite no restrictions based on age/weight/renal function)  - continue beta blocker as above - continue home ARB, beta blocker as above, and Ranexa 500mg BID  - pt states she does not need to be on a statin despite hx of PCI w/ stents to RCA  - cardiology follow up

## 2018-04-03 NOTE — PROGRESS NOTE ADULT - PROBLEM SELECTOR PLAN 4
Hypervolemic hyponatremia, improving   C/w Diuresis. - likely multifactorial from respiratory failure Co2 retention, and acute on chronic CHF exacerbation;   no focal neurologic deficits, CT Head unremarkable, ECG w/ rate controlled Afib  - c/w Oxygen and NIV as needed, ABGS with co2 monitoring if patient symptomatic  - Appreciate pulmonary recommendations.   - Appreciate cardiology recommendations.

## 2018-04-03 NOTE — PROGRESS NOTE ADULT - PROBLEM SELECTOR PLAN 2
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 Arciniega, but she is very noncompliant as the machine exacerbates her dry mouth and she finds it intolerable - has not been compliant  - will continue AVAPS as needed. BIPAP at night  -ABG monitoring if symptomatic    -will attempt to wean daytime use with continued encouragement for adherence to nighttime use  - pulmonology' s recommendations appreciated

## 2018-04-03 NOTE — PROGRESS NOTE ADULT - ASSESSMENT
Impression :   CHF  Mild interstitial lung disease presumed to be due to previous amiodarone use        Recommend :   Continue diuresis as per Cardiology and primary team  Maintain on oxygen during the day and NIV at night      Obi Arciniega MD, FCCP  [facility]  Pulmonary Medicine

## 2018-04-03 NOTE — CONSULT NOTE ADULT - ASSESSMENT
77F L dorsal foot/anterior ankle healed ulcer with scab  - Pt seen and evaluated  - No acute findings on exam, wound is now healed and closed with well-adhered scab  - No acute SOI  - Cover with allevyn dressing to protect from friction/sheer  - No pod sx intervention at this time, reconsult as needed  - F/u out pt with Dr. Arango  - d/w attending
78 yo female with significant cardiopulmonary dz with "weakness" on admission; acute on chronic hypercapnic resp failure; improved ABG post BIPAP use  Biventricular failure  Chest xray without acute infiltrate      Decrease BIPAP use as tolerated; must use at night  Supplemental O2  As per cardiology  F/u labs    Discussed with pt's daughter at bedside    Will follow,  Thanks,    Haider Swartz MD [facility]    [phone]
77 year-old woman with hyponatremia in the setting of hypervolemia. CHF acute on chronic systolic and diastolic.  Severe B LE edema.  HTN with BP controlled.

## 2018-04-04 DIAGNOSIS — K59.00 CONSTIPATION, UNSPECIFIED: ICD-10-CM

## 2018-04-04 LAB
ANION GAP SERPL CALC-SCNC: 5 MMOL/L — SIGNIFICANT CHANGE UP (ref 5–17)
BUN SERPL-MCNC: 13 MG/DL — SIGNIFICANT CHANGE UP (ref 7–23)
CALCIUM SERPL-MCNC: 9.5 MG/DL — SIGNIFICANT CHANGE UP (ref 8.4–10.5)
CHLORIDE SERPL-SCNC: 87 MMOL/L — LOW (ref 96–108)
CO2 SERPL-SCNC: 41 MMOL/L — HIGH (ref 22–31)
CREAT SERPL-MCNC: 0.72 MG/DL — SIGNIFICANT CHANGE UP (ref 0.5–1.3)
GLUCOSE SERPL-MCNC: 79 MG/DL — SIGNIFICANT CHANGE UP (ref 70–99)
HCT VFR BLD CALC: 31 % — LOW (ref 34.5–45)
HGB BLD-MCNC: 9.6 G/DL — LOW (ref 11.5–15.5)
MCHC RBC-ENTMCNC: 26.7 PG — LOW (ref 27–34)
MCHC RBC-ENTMCNC: 31 GM/DL — LOW (ref 32–36)
MCV RBC AUTO: 86.4 FL — SIGNIFICANT CHANGE UP (ref 80–100)
NRBC # BLD: 0 /100 WBCS — SIGNIFICANT CHANGE UP (ref 0–0)
PLATELET # BLD AUTO: 97 K/UL — LOW (ref 150–400)
POTASSIUM SERPL-MCNC: 3.8 MMOL/L — SIGNIFICANT CHANGE UP (ref 3.5–5.3)
POTASSIUM SERPL-SCNC: 3.8 MMOL/L — SIGNIFICANT CHANGE UP (ref 3.5–5.3)
RBC # BLD: 3.59 M/UL — LOW (ref 3.8–5.2)
RBC # FLD: 15.2 % — HIGH (ref 10.3–14.5)
SODIUM SERPL-SCNC: 133 MMOL/L — LOW (ref 135–145)
WBC # BLD: 4.5 K/UL — SIGNIFICANT CHANGE UP (ref 3.8–10.5)
WBC # FLD AUTO: 4.5 K/UL — SIGNIFICANT CHANGE UP (ref 3.8–10.5)

## 2018-04-04 PROCEDURE — 99233 SBSQ HOSP IP/OBS HIGH 50: CPT | Mod: GC

## 2018-04-04 RX ORDER — POLYETHYLENE GLYCOL 3350 17 G/17G
17 POWDER, FOR SOLUTION ORAL
Qty: 0 | Refills: 0 | Status: DISCONTINUED | OUTPATIENT
Start: 2018-04-04 | End: 2018-04-12

## 2018-04-04 RX ORDER — SODIUM CHLORIDE 9 MG/ML
3 INJECTION INTRAMUSCULAR; INTRAVENOUS; SUBCUTANEOUS
Qty: 0 | Refills: 0 | Status: DISCONTINUED | OUTPATIENT
Start: 2018-04-04 | End: 2018-04-12

## 2018-04-04 RX ORDER — SPIRONOLACTONE 25 MG/1
25 TABLET, FILM COATED ORAL DAILY
Qty: 0 | Refills: 0 | Status: DISCONTINUED | OUTPATIENT
Start: 2018-04-04 | End: 2018-04-12

## 2018-04-04 RX ORDER — POLYETHYLENE GLYCOL 3350 17 G/17G
17 POWDER, FOR SOLUTION ORAL ONCE
Qty: 0 | Refills: 0 | Status: COMPLETED | OUTPATIENT
Start: 2018-04-04 | End: 2018-04-04

## 2018-04-04 RX ADMIN — Medication 40 MILLIGRAM(S): at 15:08

## 2018-04-04 RX ADMIN — POLYETHYLENE GLYCOL 3350 17 GRAM(S): 17 POWDER, FOR SOLUTION ORAL at 09:57

## 2018-04-04 RX ADMIN — RANOLAZINE 500 MILLIGRAM(S): 500 TABLET, FILM COATED, EXTENDED RELEASE ORAL at 09:06

## 2018-04-04 RX ADMIN — Medication 40 MILLIGRAM(S): at 06:33

## 2018-04-04 RX ADMIN — Medication 1 APPLICATION(S): at 11:57

## 2018-04-04 RX ADMIN — LOSARTAN POTASSIUM 100 MILLIGRAM(S): 100 TABLET, FILM COATED ORAL at 06:33

## 2018-04-04 RX ADMIN — Medication 100 MILLIGRAM(S): at 06:33

## 2018-04-04 RX ADMIN — RANOLAZINE 500 MILLIGRAM(S): 500 TABLET, FILM COATED, EXTENDED RELEASE ORAL at 21:20

## 2018-04-04 RX ADMIN — CARVEDILOL PHOSPHATE 12.5 MILLIGRAM(S): 80 CAPSULE, EXTENDED RELEASE ORAL at 21:11

## 2018-04-04 RX ADMIN — POLYETHYLENE GLYCOL 3350 17 GRAM(S): 17 POWDER, FOR SOLUTION ORAL at 09:06

## 2018-04-04 RX ADMIN — CARVEDILOL PHOSPHATE 12.5 MILLIGRAM(S): 80 CAPSULE, EXTENDED RELEASE ORAL at 09:06

## 2018-04-04 RX ADMIN — SODIUM CHLORIDE 3 MILLILITER(S): 9 INJECTION INTRAMUSCULAR; INTRAVENOUS; SUBCUTANEOUS at 22:00

## 2018-04-04 RX ADMIN — APIXABAN 2.5 MILLIGRAM(S): 2.5 TABLET, FILM COATED ORAL at 06:33

## 2018-04-04 RX ADMIN — APIXABAN 2.5 MILLIGRAM(S): 2.5 TABLET, FILM COATED ORAL at 17:29

## 2018-04-04 RX ADMIN — SPIRONOLACTONE 25 MILLIGRAM(S): 25 TABLET, FILM COATED ORAL at 13:17

## 2018-04-04 NOTE — PROGRESS NOTE ADULT - PROBLEM SELECTOR PLAN 5
Hypervolemic hyponatremia, improving   C/w Diuresis. - chronic and at baseline as per review of HIE (Hgb in 9-10s)

## 2018-04-04 NOTE — PROGRESS NOTE ADULT - PROBLEM SELECTOR PLAN 3
- improved with NIV as above  - etiology could be related to chronic lung disease vs. excessive supplemental O2 administration in chronic hypercarbic respiratory failure - likely multifactorial from respiratory failure Co2 retention, and acute on chronic CHF exacerbation, deconditioning   no focal neurologic deficits, CT Head unremarkable, ECG w/ rate controlled Afib  - c/w Oxygen and NIV as needed, ABGS with co2 monitoring if patient symptomatic

## 2018-04-04 NOTE — PROGRESS NOTE ADULT - PROBLEM SELECTOR PLAN 9
- continue home ARB, beta blocker as above, and Ranexa 500mg BID  - pt states she does not need to be on a statin despite hx of PCI w/ stents to RCA  - cardiology follow up C/w Colace. Miralax x1 dose, if no BM by tonight will give dulcolax, if no BM by tomorrow will need suppository

## 2018-04-04 NOTE — PROGRESS NOTE ADULT - PROBLEM SELECTOR PLAN 2
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 Arciniega, but she is very noncompliant as the machine exacerbates her dry mouth and she finds it intolerable - has not been compliant  - will continue AVAPS as needed. 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 Arciniega  - will continue AVAPS as needed. BIPAP at night  -ABG monitoring if symptomatic    - pulm f/u

## 2018-04-04 NOTE — PROGRESS NOTE ADULT - SUBJECTIVE AND OBJECTIVE BOX
ILAN JAIMES  MRN-[id number]    Chief Complaint: Patient is a 77y old  Female who presents with a chief complaint of generalized weakness (31 Mar 2018 15:30)  Patient reports feeling better overall.     SUBJECTIVE / OVERNIGHT EVENTS:    Review of Systems:   14 point ROS negative in detail except for the above.  Unable to evaluate ROS due to: cognitive deficits / altered mental status    MEDICATIONS  (STANDING):  apixaban 2.5 milliGRAM(s) Oral every 12 hours  carvedilol 12.5 milliGRAM(s) Oral every 12 hours  docusate sodium 100 milliGRAM(s) Oral three times a day  furosemide   Injectable 40 milliGRAM(s) IV Push <User Schedule>  losartan 100 milliGRAM(s) Oral daily  polyethylene glycol 3350 17 Gram(s) Oral daily  polyethylene glycol 3350 17 Gram(s) Oral once  povidone iodine 10% Swab 1 Application(s) Topical daily  ranolazine 500 milliGRAM(s) Oral two times a day  senna 2 Tablet(s) Oral at bedtime    MEDICATIONS  (PRN):      T(C): 36.4 (04-04-18 @ 04:50), Max: 36.7 (04-03-18 @ 12:55)  HR: 66 (04-04-18 @ 05:30) (54 - 66)  BP: 140/66 (04-04-18 @ 06:00) (114/78 - 159/75)  RR: 18 (04-04-18 @ 04:50) (18 - 19)  SpO2: 96% (04-04-18 @ 05:30) (95% - 97%)    CAPILLARY BLOOD GLUCOSE    I&O's Summary    03 Apr 2018 07:01  -  04 Apr 2018 07:00  --------------------------------------------------------  IN: 500 mL / OUT: 2400 mL / NET: -1900 mL    04 Apr 2018 07:01  -  04 Apr 2018 09:12  --------------------------------------------------------  IN: 0 mL / OUT: 400 mL / NET: -400 mL    Allergies    latex (Unknown)  No Known Drug Allergies    Intolerances    PHYSICAL EXAM:  GENERAL: Obese, elderly female  HEAD:  Atraumatic, Normocephalic  EYES: EOMI, PERRLA, conjunctiva and sclera clear  NECK: Supple, No JVD  CHEST/LUNG: Clear to auscultation bilaterally; No wheeze  HEART: Regular rate and rhythm; No murmurs, rubs, or edema  ABDOMEN: Soft, Nontender, Nondistended; Bowel sounds present  EXTREMITIES: Marked edema, dorsal left foot wound   PSYCH: Normal mood and affect, alert and oriented  NEUROLOGY: Grossly intact   SKIN: No rashes or lesions    LABS:    04-04    133<L>  |  87<L>  |  13  ----------------------------<  79  3.8   |  41<H>  |  0.72    Ca    9.5      04 Apr 2018 06:24  Mg     1.9     04-03                Blood Cultures        RADIOLOGY & ADDITIONAL TESTS:    Imaging:    < from: Xray Chest 1 View AP/PA (03.31.18 @ 10:58) >  IMPRESSION:  Small left pleural effusion with adjacentairspace disease; superimposed   pneumonia not excluded. Mild right basilar opacities.    EKG/Telemetry:  < from: 12 Lead ECG (03.31.18 @ 09:46) >    Ventricular Rate 63 BPM    Atrial Rate 39 BPM    QRS Duration 126 ms     ms    QTc 444 ms    R Axis 84 degrees    T Axis -67 degrees    Diagnosis Line ATRIAL FIBRILLATION  NON-SPECIFIC INTRA-VENTRICULAR CONDUCTION BLOCK  POSSIBLE INFERIOR INFARCT , AGE UNDETERMINED  ABNORMAL ECG      Consultant(s) Notes Reviewed:  Cardiology, Pulmonology, Physical therapy, nephrology     Care Discussed with Consultants/Other Providers: yes     The above recommendations were discussed with the patient/family. The patient/family had all questions answered and expressed understanding of the plan. ILAN JAIMES  MRN-[id number]    Chief Complaint: Patient is a 77y old  Female who presents with a chief complaint of generalized weakness (31 Mar 2018 15:30)  Patient reports feeling better overall. Has not had a bowel movement since Saturday.     SUBJECTIVE / OVERNIGHT EVENTS:    Review of Systems:   14 point ROS negative in detail except for the above.  Unable to evaluate ROS due to: cognitive deficits / altered mental status    MEDICATIONS  (STANDING):  apixaban 2.5 milliGRAM(s) Oral every 12 hours  carvedilol 12.5 milliGRAM(s) Oral every 12 hours  docusate sodium 100 milliGRAM(s) Oral three times a day  furosemide   Injectable 40 milliGRAM(s) IV Push <User Schedule>  losartan 100 milliGRAM(s) Oral daily  polyethylene glycol 3350 17 Gram(s) Oral daily  polyethylene glycol 3350 17 Gram(s) Oral once  povidone iodine 10% Swab 1 Application(s) Topical daily  ranolazine 500 milliGRAM(s) Oral two times a day  senna 2 Tablet(s) Oral at bedtime    MEDICATIONS  (PRN):      T(C): 36.4 (04-04-18 @ 04:50), Max: 36.7 (04-03-18 @ 12:55)  HR: 66 (04-04-18 @ 05:30) (54 - 66)  BP: 140/66 (04-04-18 @ 06:00) (114/78 - 159/75)  RR: 18 (04-04-18 @ 04:50) (18 - 19)  SpO2: 96% (04-04-18 @ 05:30) (95% - 97%)    CAPILLARY BLOOD GLUCOSE    I&O's Summary    03 Apr 2018 07:01  -  04 Apr 2018 07:00  --------------------------------------------------------  IN: 500 mL / OUT: 2400 mL / NET: -1900 mL    04 Apr 2018 07:01  -  04 Apr 2018 09:12  --------------------------------------------------------  IN: 0 mL / OUT: 400 mL / NET: -400 mL    Allergies    latex (Unknown)  No Known Drug Allergies    Intolerances    PHYSICAL EXAM:  GENERAL: Obese, elderly female  HEAD:  Atraumatic, Normocephalic  EYES: EOMI, PERRLA, conjunctiva and sclera clear  NECK: Supple, No JVD  CHEST/LUNG: Clear to auscultation bilaterally; No wheeze  HEART: Regular rate and rhythm; No murmurs, rubs, or edema  ABDOMEN: Soft, Nontender, Nondistended; Bowel sounds present  EXTREMITIES: Marked edema, dorsal left foot wound   PSYCH: Normal mood and affect, alert and oriented  NEUROLOGY: Grossly intact   SKIN: No rashes or lesions    LABS:    04-04    133<L>  |  87<L>  |  13  ----------------------------<  79  3.8   |  41<H>  |  0.72    Ca    9.5      04 Apr 2018 06:24  Mg     1.9     04-03                Blood Cultures        RADIOLOGY & ADDITIONAL TESTS:    Imaging:    < from: Xray Chest 1 View AP/PA (03.31.18 @ 10:58) >  IMPRESSION:  Small left pleural effusion with adjacentairspace disease; superimposed   pneumonia not excluded. Mild right basilar opacities.    EKG/Telemetry:  < from: 12 Lead ECG (03.31.18 @ 09:46) >    Ventricular Rate 63 BPM    Atrial Rate 39 BPM    QRS Duration 126 ms     ms    QTc 444 ms    R Axis 84 degrees    T Axis -67 degrees    Diagnosis Line ATRIAL FIBRILLATION  NON-SPECIFIC INTRA-VENTRICULAR CONDUCTION BLOCK  POSSIBLE INFERIOR INFARCT , AGE UNDETERMINED  ABNORMAL ECG      Consultant(s) Notes Reviewed:  Cardiology, Pulmonology, Physical therapy, nephrology     Care Discussed with Consultants/Other Providers: yes     The above recommendations were discussed with the patient/family. The patient/family had all questions answered and expressed understanding of the plan. ILAN JAIMES  MRN-[id number]    Chief Complaint: Patient is a 77y old  Female who presents with a chief complaint of generalized weakness (31 Mar 2018 15:30)      SUBJECTIVE / OVERNIGHT EVENTS: Patient seen and examined at bedside. Patient reports feeling better overall. Has not had a bowel movement since Saturday. At home she takes dulcolax as needed but mainly she has regular BMs    Review of Systems:   14 point ROS negative in detail except for the above.    MEDICATIONS  (STANDING):  apixaban 2.5 milliGRAM(s) Oral every 12 hours  carvedilol 12.5 milliGRAM(s) Oral every 12 hours  docusate sodium 100 milliGRAM(s) Oral three times a day  furosemide   Injectable 40 milliGRAM(s) IV Push <User Schedule>  losartan 100 milliGRAM(s) Oral daily  polyethylene glycol 3350 17 Gram(s) Oral daily  polyethylene glycol 3350 17 Gram(s) Oral once  povidone iodine 10% Swab 1 Application(s) Topical daily  ranolazine 500 milliGRAM(s) Oral two times a day  senna 2 Tablet(s) Oral at bedtime    MEDICATIONS  (PRN):      T(C): 36.4 (04-04-18 @ 04:50), Max: 36.7 (04-03-18 @ 12:55)  HR: 66 (04-04-18 @ 05:30) (54 - 66)  BP: 140/66 (04-04-18 @ 06:00) (114/78 - 159/75)  RR: 18 (04-04-18 @ 04:50) (18 - 19)  SpO2: 96% (04-04-18 @ 05:30) (95% - 97%)    CAPILLARY BLOOD GLUCOSE    I&O's Summary    03 Apr 2018 07:01  -  04 Apr 2018 07:00  --------------------------------------------------------  IN: 500 mL / OUT: 2400 mL / NET: -1900 mL    04 Apr 2018 07:01  -  04 Apr 2018 09:12  --------------------------------------------------------  IN: 0 mL / OUT: 400 mL / NET: -400 mL    Allergies    latex (Unknown)  No Known Drug Allergies    Intolerances    PHYSICAL EXAM:  GENERAL: Obese, elderly female  HEAD:  Atraumatic, Normocephalic  EYES: EOMI, PERRLA, conjunctiva and sclera clear  NECK: Supple, No JVD  CHEST/LUNG: Clear to auscultation bilaterally; No wheeze  HEART: Regular rate and rhythm; No murmurs, rubs, or edema  ABDOMEN: Soft, Nontender, Nondistended; Bowel sounds present  EXTREMITIES: +2 b/l LYNDA, dorsal left foot wound   PSYCH: Normal mood and affect, alert and oriented  NEUROLOGY: Grossly intact   SKIN: No rashes or lesions    LABS:    04-04    133<L>  |  87<L>  |  13  ----------------------------<  79  3.8   |  41<H>  |  0.72    Ca    9.5      04 Apr 2018 06:24  Mg     1.9     04-03    Tele reviewed - afib vpaced frequent PVCs              Blood Cultures        RADIOLOGY & ADDITIONAL TESTS:    Imaging:    < from: Xray Chest 1 View AP/PA (03.31.18 @ 10:58) >  IMPRESSION:  Small left pleural effusion with adjacentairspace disease; superimposed   pneumonia not excluded. Mild right basilar opacities.    EKG/Telemetry:  < from: 12 Lead ECG (03.31.18 @ 09:46) >    Ventricular Rate 63 BPM    Atrial Rate 39 BPM    QRS Duration 126 ms     ms    QTc 444 ms    R Axis 84 degrees    T Axis -67 degrees    Diagnosis Line ATRIAL FIBRILLATION  NON-SPECIFIC INTRA-VENTRICULAR CONDUCTION BLOCK  POSSIBLE INFERIOR INFARCT , AGE UNDETERMINED  ABNORMAL ECG      Consultant(s) Notes Reviewed:  Cardiology, Pulmonology, Physical therapy, nephrology     Care Discussed with Consultants/Other Providers: yes     The above recommendations were discussed with the patient/family. The patient/family had all questions answered and expressed understanding of the plan.

## 2018-04-04 NOTE — PROGRESS NOTE ADULT - PROBLEM SELECTOR PLAN 4
- likely multifactorial from respiratory failure Co2 retention, and acute on chronic CHF exacerbation;   no focal neurologic deficits, CT Head unremarkable, ECG w/ rate controlled Afib  - c/w Oxygen and NIV as needed, ABGS with co2 monitoring if patient symptomatic  - Appreciate pulmonary recommendations.   - Appreciate cardiology recommendations. Hypervolemic hyponatremia, improving with diuresis   C/w Diuresis.

## 2018-04-04 NOTE — PROGRESS NOTE ADULT - PROBLEM SELECTOR PLAN 6
- chronic and at baseline as per review of HIE (Hgb in 9-10s) - rate controlled  - will continue anticoagulation w/ Eliquis 2.5mg BID (as per pt, she tends to bleed, and was then placed on reduced dosing, despite no restrictions based on age/weight/renal function)  - continue beta blocker as above

## 2018-04-04 NOTE — PROGRESS NOTE ADULT - ASSESSMENT
77yoF w/ PMHx significant for HFrEF (LVEF = 19%) s/p AICD, CAD s/p stents w/ stable angina (pt on beta blocker and Ranolazine), Afib (on reduced dose Eliquis 2/2 "propensity for bleeding" as per family), interstitial fibrosis d/t Amiodarone toxicity, on home O2 (2.5L via NC; and intermittent NIV, unclear whether CPAP vs. BiPAP, though pt noncompliant), AAA s/p repair, a/w acute on chronic respiratory failure due to pulmonary fibrosis and acute on chronic systolic heart failure, respiratory acidosis, and hyponatremia. 77yoF w/ PMHx significant for HFrEF (LVEF = 19%) s/p AICD, CAD s/p stents w/ stable angina (pt on beta blocker and Ranolazine), Afib (on reduced dose Eliquis 2/2 "propensity for bleeding" as per family), interstitial fibrosis d/t Amiodarone toxicity, on home O2 (2.5L via NC; and intermittent NIV, unclear whether CPAP vs. BiPAP, though pt noncompliant), AAA s/p repair, a/w acute on chronic respiratory failure due to pulmonary fibrosis and acute on chronic systolic heart failure, respiratory acidosis, and hyponatremia. Patient now on BIPAP only at night, diuresing well on the IV lasix 40mg bid, patient continues to need IV diuresis

## 2018-04-04 NOTE — PROGRESS NOTE ADULT - SUBJECTIVE AND OBJECTIVE BOX
Patient is a 77y old  Female who presents with a chief complaint of generalized weakness (31 Mar 2018 15:30)      INTERVAL HISTORY: feels better    	  MEDICATIONS:  carvedilol 12.5 milliGRAM(s) Oral every 12 hours  furosemide   Injectable 40 milliGRAM(s) IV Push <User Schedule>  losartan 100 milliGRAM(s) Oral daily  ranolazine 500 milliGRAM(s) Oral two times a day  spironolactone 25 milliGRAM(s) Oral daily        PHYSICAL EXAM:  T(C): 36.8 (04-04-18 @ 13:04), Max: 36.8 (04-04-18 @ 13:04)  HR: 58 (04-04-18 @ 13:04) (54 - 68)  BP: 142/61 (04-04-18 @ 13:04) (114/78 - 159/75)  RR: 19 (04-04-18 @ 13:04) (18 - 19)  SpO2: 98% (04-04-18 @ 13:04) (95% - 98%)  Wt(kg): --  I&O's Summary    03 Apr 2018 07:01  -  04 Apr 2018 07:00  --------------------------------------------------------  IN: 500 mL / OUT: 2400 mL / NET: -1900 mL    04 Apr 2018 07:01  -  04 Apr 2018 15:20  --------------------------------------------------------  IN: 240 mL / OUT: 1200 mL / NET: -960 mL        Weight (kg): 92.3 (04-04 @ 09:33)    Appearance: Normal	  HEENT:    PERRL, EOMI	  Cardiovascular: Normal S1 S2, No JVD  Respiratory: Lungs clear to auscultation	  Psychiatry: Alert  Gastrointestinal:  Soft, Non-tender, + BS	  Skin: No rashes, No cyanosis  Extremities:  No edema of LE                                9.6    4.50  )-----------( 97       ( 04 Apr 2018 07:45 )             31.0     04-04    133<L>  |  87<L>  |  13  ----------------------------<  79  3.8   |  41<H>  |  0.72    Ca    9.5      04 Apr 2018 06:24  Mg     1.9     04-03          Labs, imaging and telemetry personally reviewed      ASSESSMENT/PLAN: 	  Assessment and Plan:   · Assessment		  77yoF w/ PMHx significant for HFrEF (LVEF = 19%) s/p AICD, CAD s/p stents w/ stable angina (pt on beta blocker and Ranolazine), Afib (on reduced dose Eliquis 2/2 "propensity for bleeding" as per family), interstitial fibrosis d/t Amiodarone toxicity, on home O2 (2.5L via NC; and intermittent NIV, unclear whether CPAP vs. BiPAP, though pt noncompliant), AAA s/p repair, who presents from home where she lives with her , w/ complaints of generalized weakness. She is being admitted for her complaints, along w/ acute on chronic respiratory failure / respiratory acidosis, and hyponatremia.      Problem/Plan - 1:  ·  Problem: Chronic systolic heart failure.  Plan: -   - c/w Lasix 40mg IV BID, good response to diuresis  - closely monitor fluid status (strict Is/Os, daily weights, PEx), renal function, and electrolytes  - continue home Losartan 100mg daily, and Carvedilol 12.5mg BID.   - added aldactone to facilitate diuresis - monitor BMP daily        Problem/Plan - 2:  ·  Problem: Acute on chronic respiratory failure with hypoxia and hypercapnia.  Plan: - on home O2 - 2.5L -  - pt chronic CO2 retainer with bicarb of 35 as outpt one month ago  - pt appears grossly volume overloaded  - c/w diuresis with Lasix 40mg IV BID     Problem/Plan - 3:  ·  Problem: Hyponatremia.  Plan: likely Hypervolemic hyponatremia  C/w Diuresis and trend Na.   - renal consult appreciated       Problem/Plan - 4:  ·  Problem: Coronary artery disease of native artery of native heart with stable angina pectoris.  Plan: - continue home ARB, beta blocker as above, and Ranexa 500mg BID       Problem/Plan - 5:  ·  Problem: Atrial fibrillation.  Plan: - rate controlled  - will continue anticoagulation w/ Eliquis 2.5mg BID (home dose)  - continue beta blocker as above.   - ICD interrogated with no events           Gamaliel Russell DO [facility]  Cardiovascular Medicine  [phone]

## 2018-04-04 NOTE — PROGRESS NOTE ADULT - SUBJECTIVE AND OBJECTIVE BOX
Pulmonary Progress  Note      admitted with CHF Exacerbation    CHF  reduced EF, improving on diuresis    metabolic alkalosis persists due to diuretic and chronic hypercapnia     chronic lower extremity edema    afib on Eliquis    using BIPAP at night      ros no fever chest pain abd pain  chronic LE edema    Afebrile    110/78    sat 95%    hr 54    neck thick    lungs moderate coarse, diminished aeration  cor rrr  abd nt soft no hsm    extr chronic 3-4+ edema pretibial  skin chronic venous stasis changes off legs    wbc 4.5  hgb 9.6    pl 93    Na 133  creat 0.72    last abg 4/1    7.43  62/70    cw met alkalosis resp acidosis      IMP    CHF  chf exacerbation  pulm edema  interstitial lung disease    acute on chronic hypercapnic resp failure    plan  Continue diuresis and add spironolactone to  compensate for met alkalosis    BIPAP AVAP at night, and as needed in day    remain son ranexa    nasal cannula O2    labs meds reviewed    discussed with team    MD Obi Choi MD, MD    [facility]    [phone]

## 2018-04-04 NOTE — PROGRESS NOTE ADULT - PROBLEM SELECTOR PLAN 8
- lower than baseline of 110-130s  - no signs/sx of bleeding/bruising  - will continue to monitor - continue home ARB, beta blocker as above, and Ranexa 500mg BID  - pt states she does not need to be on a statin despite hx of PCI w/ stents to RCA

## 2018-04-04 NOTE — PROGRESS NOTE ADULT - PROBLEM SELECTOR PLAN 7
- rate controlled  - will continue anticoagulation w/ Eliquis 2.5mg BID (as per pt, she tends to bleed, and was then placed on reduced dosing, despite no restrictions based on age/weight/renal function)  - continue beta blocker as above - lower than baseline of 110-130s  - no signs/sx of bleeding/bruising  - will continue to monitor

## 2018-04-04 NOTE — PROGRESS NOTE ADULT - PROBLEM SELECTOR PLAN 1
- Appreciate cardiology consult  - c/w Lasix 40mg BID IV   -monitor on tele for close monitoring  - closely monitor fluid status (strict Is/Os, daily weights, PEx), renal function, and electrolytes  - continue home Losartan 100mg daily, and Carvedilol 12.5mg BID  strict I's and O's - Appreciate cardiology consult  - c/w Lasix 40mg BID IV - o/n 2.4 L output - c/w current dose  -monitor on tele for close monitoring - frequent PVCs  - closely monitor fluid status (strict Is/Os, daily weights, PEx), renal function, and electrolytes  - continue home Losartan 100mg daily, and Carvedilol 12.5mg BID  strict I's and O's

## 2018-04-04 NOTE — PROGRESS NOTE ADULT - PROBLEM SELECTOR PROBLEM 8
Thrombocytopenia Coronary artery disease of native artery of native heart with stable angina pectoris

## 2018-04-05 LAB
ANION GAP SERPL CALC-SCNC: 5 MMOL/L — SIGNIFICANT CHANGE UP (ref 5–17)
BUN SERPL-MCNC: 11 MG/DL — SIGNIFICANT CHANGE UP (ref 7–23)
CALCIUM SERPL-MCNC: 9.2 MG/DL — SIGNIFICANT CHANGE UP (ref 8.4–10.5)
CHLORIDE SERPL-SCNC: 86 MMOL/L — LOW (ref 96–108)
CO2 SERPL-SCNC: 42 MMOL/L — HIGH (ref 22–31)
CREAT SERPL-MCNC: 0.71 MG/DL — SIGNIFICANT CHANGE UP (ref 0.5–1.3)
GLUCOSE SERPL-MCNC: 82 MG/DL — SIGNIFICANT CHANGE UP (ref 70–99)
HCT VFR BLD CALC: 28.4 % — LOW (ref 34.5–45)
HGB BLD-MCNC: 8.8 G/DL — LOW (ref 11.5–15.5)
MAGNESIUM SERPL-MCNC: 1.9 MG/DL — SIGNIFICANT CHANGE UP (ref 1.6–2.6)
MCHC RBC-ENTMCNC: 27 PG — SIGNIFICANT CHANGE UP (ref 27–34)
MCHC RBC-ENTMCNC: 31 GM/DL — LOW (ref 32–36)
MCV RBC AUTO: 87.1 FL — SIGNIFICANT CHANGE UP (ref 80–100)
NRBC # BLD: 0 /100 WBCS — SIGNIFICANT CHANGE UP (ref 0–0)
PHOSPHATE SERPL-MCNC: 3.9 MG/DL — SIGNIFICANT CHANGE UP (ref 2.5–4.5)
PLATELET # BLD AUTO: 90 K/UL — LOW (ref 150–400)
POTASSIUM SERPL-MCNC: 3.7 MMOL/L — SIGNIFICANT CHANGE UP (ref 3.5–5.3)
POTASSIUM SERPL-SCNC: 3.7 MMOL/L — SIGNIFICANT CHANGE UP (ref 3.5–5.3)
RBC # BLD: 3.26 M/UL — LOW (ref 3.8–5.2)
RBC # FLD: 15.7 % — HIGH (ref 10.3–14.5)
SODIUM SERPL-SCNC: 133 MMOL/L — LOW (ref 135–145)
WBC # BLD: 3.9 K/UL — SIGNIFICANT CHANGE UP (ref 3.8–10.5)
WBC # FLD AUTO: 3.9 K/UL — SIGNIFICANT CHANGE UP (ref 3.8–10.5)

## 2018-04-05 PROCEDURE — 99233 SBSQ HOSP IP/OBS HIGH 50: CPT | Mod: GC

## 2018-04-05 PROCEDURE — 93306 TTE W/DOPPLER COMPLETE: CPT | Mod: 26

## 2018-04-05 RX ORDER — POTASSIUM CHLORIDE 20 MEQ
20 PACKET (EA) ORAL ONCE
Qty: 0 | Refills: 0 | Status: COMPLETED | OUTPATIENT
Start: 2018-04-05 | End: 2018-04-05

## 2018-04-05 RX ADMIN — LOSARTAN POTASSIUM 100 MILLIGRAM(S): 100 TABLET, FILM COATED ORAL at 05:57

## 2018-04-05 RX ADMIN — SODIUM CHLORIDE 3 MILLILITER(S): 9 INJECTION INTRAMUSCULAR; INTRAVENOUS; SUBCUTANEOUS at 17:13

## 2018-04-05 RX ADMIN — POLYETHYLENE GLYCOL 3350 17 GRAM(S): 17 POWDER, FOR SOLUTION ORAL at 05:58

## 2018-04-05 RX ADMIN — Medication 20 MILLIEQUIVALENT(S): at 14:24

## 2018-04-05 RX ADMIN — RANOLAZINE 500 MILLIGRAM(S): 500 TABLET, FILM COATED, EXTENDED RELEASE ORAL at 21:34

## 2018-04-05 RX ADMIN — CARVEDILOL PHOSPHATE 12.5 MILLIGRAM(S): 80 CAPSULE, EXTENDED RELEASE ORAL at 21:34

## 2018-04-05 RX ADMIN — RANOLAZINE 500 MILLIGRAM(S): 500 TABLET, FILM COATED, EXTENDED RELEASE ORAL at 10:23

## 2018-04-05 RX ADMIN — SODIUM CHLORIDE 3 MILLILITER(S): 9 INJECTION INTRAMUSCULAR; INTRAVENOUS; SUBCUTANEOUS at 05:58

## 2018-04-05 RX ADMIN — APIXABAN 2.5 MILLIGRAM(S): 2.5 TABLET, FILM COATED ORAL at 05:57

## 2018-04-05 RX ADMIN — Medication 40 MILLIGRAM(S): at 14:24

## 2018-04-05 RX ADMIN — SPIRONOLACTONE 25 MILLIGRAM(S): 25 TABLET, FILM COATED ORAL at 05:57

## 2018-04-05 RX ADMIN — Medication 1 APPLICATION(S): at 14:30

## 2018-04-05 RX ADMIN — CARVEDILOL PHOSPHATE 12.5 MILLIGRAM(S): 80 CAPSULE, EXTENDED RELEASE ORAL at 10:23

## 2018-04-05 RX ADMIN — APIXABAN 2.5 MILLIGRAM(S): 2.5 TABLET, FILM COATED ORAL at 17:13

## 2018-04-05 RX ADMIN — Medication 40 MILLIGRAM(S): at 05:58

## 2018-04-05 NOTE — PROGRESS NOTE ADULT - SUBJECTIVE AND OBJECTIVE BOX
PULMONARY      CHF    peripheral edema      on spironolactone, furosemide  apixaban  losartan ranexa    HR 61  /70 afebrile      LUNGS    COR a    abd     EXTR    LABS    WBC 3.9  HGB  8.8  plat 90    creat 0.71  bun 11  bicarb 42  Na 133 PULMONARY      CHF    peripheral edema      on spironolactone, furosemide  apixaban  losartan ranexa    HR 61  /70 afebrile      LUNGS:  equal diminished, unlabored    COR RR no murmur    abd protuberant soft nt non hsm    EXTR  moderate LE edema  skin chronic changes    neuro A OX 3    LABS    WBC 3.9  HGB  8.8  plat 90    creat 0.71  bun 11  bicarb 42  Na 133        IMPR  hypercapnic resp failure    CHF    edema    REC    continue diuretic    monitor compensatory alkalosis    continue BIPAP    please ask respiratory to adjust settings or mask as she states she is uncomfortable      MD Obi Choi MD Chris Iakovou, MD    [facility]    [phone]

## 2018-04-05 NOTE — PROGRESS NOTE ADULT - PROBLEM SELECTOR PLAN 9
C/w Colace. Miralax x1 dose, if no BM by tonight will give dulcolax, if no BM by tomorrow will need suppository C/w Colace. Miralax x1 dose s/p suppository. C/w Colace. Miralax x1 dose s/p suppository - had 3 BMs yesterday

## 2018-04-05 NOTE — PROGRESS NOTE ADULT - SUBJECTIVE AND OBJECTIVE BOX
ILAN [id number]  77y  Female  Weakness  H/o or current diagnosis of HF- no contraindication to ACEI/ARBs  No pertinent family history in first degree relatives  Handoff  MEWS Score  Pulmonary fibrosis  CHF (congestive heart failure)  AICD (automatic cardioverter/defibrillator) present  Atrial fibrillation  Stented coronary artery  Weakness  Constipation  Acute on chronic systolic heart failure  Hypertensive heart disease with acute on chronic combined systolic and diastolic congestive heart failure  Edema of lower extremity  Acute on chronic combined systolic and diastolic congestive heart failure  Thrombocytopenia  Normocytic anemia  Atrial fibrillation  Coronary artery disease of native artery of native heart with stable angina pectoris  Chronic systolic heart failure  Pulmonary fibrosis  Hyponatremia  Respiratory acidosis  Acute on chronic respiratory failure with hypoxia and hypercapnia  Generalized weakness  H/O hernia repair  H/O abdominal hysterectomy  WEAKNESS X 2 WEEKS  RAD CDS  90+    apixaban 2.5 milliGRAM(s) Oral every 12 hours  carvedilol 12.5 milliGRAM(s) Oral every 12 hours  docusate sodium 100 milliGRAM(s) Oral three times a day  furosemide   Injectable 40 milliGRAM(s) IV Push <User Schedule>  losartan 100 milliGRAM(s) Oral daily  polyethylene glycol 3350 17 Gram(s) Oral two times a day  povidone iodine 10% Swab 1 Application(s) Topical daily  ranolazine 500 milliGRAM(s) Oral two times a day  senna 2 Tablet(s) Oral at bedtime  sodium chloride 0.9% for Nebulization 3 milliLiter(s) Nebulizer four times a day  spironolactone 25 milliGRAM(s) Oral daily  latex (Unknown)  No Known Drug Allergies    CBC Full  -  ( 05 Apr 2018 07:37 )  WBC Count : 3.90 K/uL  Hemoglobin : 8.8 g/dL  Hematocrit : 28.4 %  Platelet Count - Automated : 90 K/uL  Mean Cell Volume : 87.1 fl  Mean Cell Hemoglobin : 27.0 pg  Mean Cell Hemoglobin Concentration : 31.0 gm/dL  Patient visited resting comfortably at bedisde in no apparent distress  left foot allevyn removed and no ulceration noted to remain  left foot wound is healed  xerotic skin both feet  Discontinue wound dressings since left foot wound is healed  apply moisturizer cream both feet and legs  reconsult podiatry as needed

## 2018-04-05 NOTE — PROGRESS NOTE ADULT - SUBJECTIVE AND OBJECTIVE BOX
Patient is a 77y old  Female who presents with a chief complaint of generalized weakness (31 Mar 2018 15:30)      INTERVAL HISTORY: none    TELEMETRY: no events  	  MEDICATIONS:  carvedilol 12.5 milliGRAM(s) Oral every 12 hours  furosemide   Injectable 40 milliGRAM(s) IV Push <User Schedule>  losartan 100 milliGRAM(s) Oral daily  ranolazine 500 milliGRAM(s) Oral two times a day  spironolactone 25 milliGRAM(s) Oral daily        PHYSICAL EXAM:  T(C): 36.5 (04-05-18 @ 14:22), Max: 36.9 (04-04-18 @ 20:52)  HR: 52 (04-05-18 @ 14:22) (52 - 64)  BP: 162/76 (04-05-18 @ 14:22) (128/70 - 164/68)  RR: 20 (04-05-18 @ 14:22) (18 - 20)  SpO2: 100% (04-05-18 @ 14:22) (96% - 100%)  Wt(kg): --  I&O's Summary    04 Apr 2018 07:01  -  05 Apr 2018 07:00  --------------------------------------------------------  IN: 440 mL / OUT: 2475 mL / NET: -2035 mL    05 Apr 2018 07:01  -  05 Apr 2018 19:00  --------------------------------------------------------  IN: 240 mL / OUT: 300 mL / NET: -60 mL          Appearance: Normal	  HEENT:    PERRL, EOMI	  Cardiovascular: Normal S1 S2, No JVD  Respiratory: Lungs clear to auscultation	  Psychiatry: Alert  Gastrointestinal:  Soft, Non-tender, + BS	  Skin: No rashes, No cyanosis  Extremities:  No edema of LE                                8.8    3.90  )-----------( 90       ( 05 Apr 2018 07:37 )             28.4     04-05    133<L>  |  86<L>  |  11  ----------------------------<  82  3.7   |  42<H>  |  0.71    Ca    9.2      05 Apr 2018 06:12  Phos  3.9     04-05  Mg     1.9     04-05          Labs, imaging and telemetry personally reviewed      ASSESSMENT/PLAN: 	  Assessment and Plan:   · Assessment		  77yoF w/ PMHx significant for HFrEF (LVEF = 19%) s/p AICD, CAD s/p stents w/ stable angina (pt on beta blocker and Ranolazine), Afib (on reduced dose Eliquis 2/2 "propensity for bleeding" as per family), interstitial fibrosis d/t Amiodarone toxicity, on home O2 (2.5L via NC; and intermittent NIV, unclear whether CPAP vs. BiPAP, though pt noncompliant), AAA s/p repair, who presents from home where she lives with her , w/ complaints of generalized weakness. She is being admitted for her complaints, along w/ acute on chronic respiratory failure / respiratory acidosis, and hyponatremia.      Problem/Plan - 1:  ·  Problem: Chronic systolic heart failure.  Plan: -   - c/w Lasix 40mg IV BID, good response to diuresis  - closely monitor fluid status (strict Is/Os, daily weights, PEx), renal function, and electrolytes  - continue home Losartan 100mg daily, and Carvedilol 12.5mg BID.   - added aldactone to facilitate diuresis - monitor BMP daily        Problem/Plan - 2:  ·  Problem: Acute on chronic respiratory failure with hypoxia and hypercapnia.  Plan: - on home O2 - 2.5L -  - pt chronic CO2 retainer with bicarb of 35 as outpt one month ago  - pt appears grossly volume overloaded  - c/w diuresis with Lasix 40mg IV BID     Problem/Plan - 3:  ·  Problem: Hyponatremia.  Plan: likely Hypervolemic hyponatremia  C/w Diuresis and trend Na.   - renal consult appreciated       Problem/Plan - 4:  ·  Problem: Coronary artery disease of native artery of native heart with stable angina pectoris.  Plan: - continue home ARB, beta blocker as above, and Ranexa 500mg BID       Problem/Plan - 5:  ·  Problem: Atrial fibrillation.  Plan: - rate controlled  - will continue anticoagulation w/ Eliquis 2.5mg BID (home dose)  - continue beta blocker as above.   - ICD interrogated with no events             Gamaliel Russell DO [facility]  Cardiovascular Medicine  [phone]

## 2018-04-05 NOTE — PROGRESS NOTE ADULT - PROBLEM SELECTOR PLAN 8
- continue home ARB, beta blocker as above, and Ranexa 500mg BID  - pt states she does not need to be on a statin despite hx of PCI w/ stents to RCA

## 2018-04-05 NOTE — PROGRESS NOTE ADULT - PROBLEM SELECTOR PLAN 3
- likely multifactorial from respiratory failure Co2 retention, and acute on chronic CHF exacerbation, deconditioning   no focal neurologic deficits, CT Head unremarkable, ECG w/ rate controlled Afib  - c/w Oxygen and NIV as needed, ABGS with co2 monitoring if patient symptomatic

## 2018-04-05 NOTE — PROGRESS NOTE ADULT - PROBLEM SELECTOR PLAN 2
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 Arciniega  - will continue AVAPS as needed. BIPAP at night  -ABG monitoring if symptomatic    - pulm f/u - on home O2 - 2.5L (as per pt, she notices feeling dizzy when her SpO2 = 100%)  - Patient is a chronic CO2 retainer, with her levels usually in the 50s  - at home uses NIV (BiPAP at night, I/E:12/6 at a rate of 12br/m for interstitial fibrosis related to Amiodarone toxicity), prescribed by her pulmonologist, Dr. Arciniega  - will change to BIPAP at night (patient is on bipap at home) - as at rehab cannot do AVAP  -ABG monitoring if symptomatic    - pulm f/u

## 2018-04-05 NOTE — PROGRESS NOTE ADULT - ASSESSMENT
77yoF w/ PMHx significant for HFrEF (LVEF = 19%) s/p AICD, CAD s/p stents w/ stable angina (pt on beta blocker and Ranolazine), Afib (on reduced dose Eliquis 2/2 "propensity for bleeding" as per family), interstitial fibrosis d/t Amiodarone toxicity, on home O2 (2.5L via NC; and intermittent NIV, unclear whether CPAP vs. BiPAP, though pt noncompliant), AAA s/p repair, a/w acute on chronic respiratory failure due to pulmonary fibrosis and acute on chronic systolic heart failure, respiratory acidosis, and hyponatremia. Patient now on BIPAP only at night, diuresing well on the IV lasix 40mg bid, patient continues to need IV diuresis 77yoF w/ PMHx significant for HFrEF (LVEF = 19%) s/p AICD, CAD s/p stents w/ stable angina (pt on beta blocker and Ranolazine), Afib (on reduced dose Eliquis 2/2 "propensity for bleeding" as per family), interstitial fibrosis d/t Amiodarone toxicity, on home O2 (2.5L via NC; and intermittent NIV, unclear whether CPAP vs. BiPAP, though pt noncompliant), AAA s/p repair, a/w acute on chronic respiratory failure due to pulmonary fibrosis and acute on chronic systolic heart failure, respiratory acidosis, and hyponatremia. Patient now on BIPAP only at night, diuresing well on the IV lasix 40mg bid, patient continues to need IV diuresis, patient with chronic metabolic alkalosis now with slight worsening - started on aldactone

## 2018-04-05 NOTE — PROGRESS NOTE ADULT - PROBLEM SELECTOR PLAN 1
- Appreciate cardiology consult  - c/w Lasix 40mg BID IV - o/n 2.4 L output - c/w current dose  -monitor on tele for close monitoring - frequent PVCs  - closely monitor fluid status (strict Is/Os, daily weights, PEx), renal function, and electrolytes  - continue home Losartan 100mg daily, and Carvedilol 12.5mg BID  strict I's and O's - Appreciate cardiology consult  - c/w Lasix 40mg BID IV - o/n 2.4 L output - c/w current dose  -monitor on tele for close monitoring - frequent PVCs  - closely monitor fluid status (strict Is/Os, daily weights, PEx), renal function, and electrolytes  - continue home Losartan 100mg daily, and Carvedilol 12.5mg BID  strict I's and O's  Started on aldactone 25mg daily for biventricular failure as well as to try to improve metabolic alkalosis, monitor K  TTE pending - as last one was in September

## 2018-04-05 NOTE — PROGRESS NOTE ADULT - SUBJECTIVE AND OBJECTIVE BOX
ILAN JAIMES  MRN-[id number]    Chief Complaint: Patient is a 77y old  Female who presents with a chief complaint of generalized weakness (31 Mar 2018 15:30)      SUBJECTIVE / OVERNIGHT EVENTS:    Review of Systems: ***  14 point ROS negative in detail except for the above.  Unable to evaluate ROS due to: cognitive deficits / altered mental status    MEDICATIONS  (STANDING):  apixaban 2.5 milliGRAM(s) Oral every 12 hours  carvedilol 12.5 milliGRAM(s) Oral every 12 hours  docusate sodium 100 milliGRAM(s) Oral three times a day  furosemide   Injectable 40 milliGRAM(s) IV Push <User Schedule>  losartan 100 milliGRAM(s) Oral daily  polyethylene glycol 3350 17 Gram(s) Oral two times a day  povidone iodine 10% Swab 1 Application(s) Topical daily  ranolazine 500 milliGRAM(s) Oral two times a day  senna 2 Tablet(s) Oral at bedtime  sodium chloride 0.9% for Nebulization 3 milliLiter(s) Nebulizer four times a day  spironolactone 25 milliGRAM(s) Oral daily    MEDICATIONS  (PRN):      T(C): 36.4 (04-05-18 @ 04:31), Max: 36.9 (04-04-18 @ 20:52)  HR: 62 (04-05-18 @ 06:53) (58 - 68)  BP: 164/68 (04-05-18 @ 04:31) (142/61 - 164/68)  RR: 18 (04-05-18 @ 04:31) (18 - 20)  SpO2: 96% (04-05-18 @ 06:53) (96% - 98%)    CAPILLARY BLOOD GLUCOSE          I&O's Summary    04 Apr 2018 07:01  -  05 Apr 2018 07:00  --------------------------------------------------------  IN: 440 mL / OUT: 2475 mL / NET: -2035 mL        Allergies    latex (Unknown)  No Known Drug Allergies    Intolerances        PHYSICAL EXAM:  GENERAL: Elderly, Obese female sitting comfortably in chair  HEAD:  Atraumatic, Normocephalic  EYES: EOMI, PERRLA, conjunctiva and sclera clear  NECK: Supple, No JVD  CHEST/LUNG: Clear to auscultation bilaterally; No wheeze  HEART: Regular rate and rhythm; No murmurs, rubs, or edema  ABDOMEN: Soft, Nontender, Nondistended; Bowel sounds present  EXTREMITIES: 2+ extremity edema, improvement noted   PSYCH: Normal mood and affect, alert and oriented  NEUROLOGY: Grossly intact   SKIN: No rashes or lesions    LABS:                        8.8    3.90  )-----------( 90       ( 05 Apr 2018 07:37 )             28.4     04-05    133<L>  |  86<L>  |  11  ----------------------------<  82  3.7   |  42<H>  |  0.71    Ca    9.2      05 Apr 2018 06:12  Phos  3.9     04-05  Mg     1.9     04-05      Blood Cultures  Na      RADIOLOGY & ADDITIONAL TESTS:    Imaging:  < from: Xray Chest 1 View AP/PA (03.31.18 @ 10:58) >    IMPRESSION:  Small left pleural effusion with adjacentairspace disease; superimposed   pneumonia not excluded. Mild right basilar opacities.      EKG/Telemetry:  < from: 12 Lead ECG (03.31.18 @ 09:46) >    Ventricular Rate 63 BPM    Atrial Rate 39 BPM    QRS Duration 126 ms     ms    QTc 444 ms    R Axis 84 degrees    T Axis -67 degrees    Diagnosis Line ATRIAL FIBRILLATION  NON-SPECIFIC INTRA-VENTRICULAR CONDUCTION BLOCK  POSSIBLE INFERIOR INFARCT , AGE UNDETERMINED  ABNORMAL ECG      Consultant(s) Notes Reviewed:    Cardiology, Pulmonology, nephrology, Physical therapy    Care Discussed with Consultants/Other Providers: yes     The above recommendations were discussed with the patient/family. The patient/family had all questions answered and expressed understanding of the plan. ILAN JAIMES  MRN-[id number]    Chief Complaint: Patient is a 77y old  Female who presents with a chief complaint of generalized weakness (31 Mar 2018 15:30)      SUBJECTIVE / OVERNIGHT EVENTS: Patient reports feeling better today. Denied respiratory distress, reported good sleep. Has been ambulating with help of PT.     Review of Systems:   14 point ROS negative in detail except for the above.  Unable to evaluate ROS due to: cognitive deficits / altered mental status    MEDICATIONS  (STANDING):  apixaban 2.5 milliGRAM(s) Oral every 12 hours  carvedilol 12.5 milliGRAM(s) Oral every 12 hours  docusate sodium 100 milliGRAM(s) Oral three times a day  furosemide   Injectable 40 milliGRAM(s) IV Push <User Schedule>  losartan 100 milliGRAM(s) Oral daily  polyethylene glycol 3350 17 Gram(s) Oral two times a day  povidone iodine 10% Swab 1 Application(s) Topical daily  ranolazine 500 milliGRAM(s) Oral two times a day  senna 2 Tablet(s) Oral at bedtime  sodium chloride 0.9% for Nebulization 3 milliLiter(s) Nebulizer four times a day  spironolactone 25 milliGRAM(s) Oral daily    MEDICATIONS  (PRN):      T(C): 36.4 (04-05-18 @ 04:31), Max: 36.9 (04-04-18 @ 20:52)  HR: 62 (04-05-18 @ 06:53) (58 - 68)  BP: 164/68 (04-05-18 @ 04:31) (142/61 - 164/68)  RR: 18 (04-05-18 @ 04:31) (18 - 20)  SpO2: 96% (04-05-18 @ 06:53) (96% - 98%)    CAPILLARY BLOOD GLUCOSE          I&O's Summary    04 Apr 2018 07:01  -  05 Apr 2018 07:00  --------------------------------------------------------  IN: 440 mL / OUT: 2475 mL / NET: -2035 mL        Allergies    latex (Unknown)  No Known Drug Allergies    Intolerances        PHYSICAL EXAM:  GENERAL: Elderly, Obese female sitting comfortably in chair  HEAD:  Atraumatic, Normocephalic  EYES: EOMI, PERRLA, conjunctiva and sclera clear  NECK: Supple, No JVD  CHEST/LUNG: Clear to auscultation bilaterally; No wheeze  HEART: Regular rate and rhythm; No murmurs, rubs, or edema  ABDOMEN: Soft, Nontender, Nondistended; Bowel sounds present  EXTREMITIES: 2+ extremity edema, improvement noted   PSYCH: Normal mood and affect, alert and oriented  NEUROLOGY: Grossly intact   SKIN: No rashes or lesions    LABS:                        8.8    3.90  )-----------( 90       ( 05 Apr 2018 07:37 )             28.4     04-05    133<L>  |  86<L>  |  11  ----------------------------<  82  3.7   |  42<H>  |  0.71    Ca    9.2      05 Apr 2018 06:12  Phos  3.9     04-05  Mg     1.9     04-05      Blood Cultures  Na      RADIOLOGY & ADDITIONAL TESTS:    Imaging:  < from: Xray Chest 1 View AP/PA (03.31.18 @ 10:58) >    IMPRESSION:  Small left pleural effusion with adjacentairspace disease; superimposed   pneumonia not excluded. Mild right basilar opacities.      EKG/Telemetry:  < from: 12 Lead ECG (03.31.18 @ 09:46) >    Ventricular Rate 63 BPM    Atrial Rate 39 BPM    QRS Duration 126 ms     ms    QTc 444 ms    R Axis 84 degrees    T Axis -67 degrees    Diagnosis Line ATRIAL FIBRILLATION  NON-SPECIFIC INTRA-VENTRICULAR CONDUCTION BLOCK  POSSIBLE INFERIOR INFARCT , AGE UNDETERMINED  ABNORMAL ECG      Consultant(s) Notes Reviewed:    Cardiology, Pulmonology, nephrology, Physical therapy    Care Discussed with Consultants/Other Providers: yes     The above recommendations were discussed with the patient/family. The patient/family had all questions answered and expressed understanding of the plan. ILAN JAIMES  MRN-[id number]    Chief Complaint: Patient is a 77y old  Female who presents with a chief complaint of generalized weakness (31 Mar 2018 15:30)      SUBJECTIVE / OVERNIGHT EVENTS: Patient reports feeling better today. Denied respiratory distress, reported good sleep. Has been ambulating with help of PT.     Review of Systems:   14 point ROS negative in detail except for the above.      MEDICATIONS  (STANDING):  apixaban 2.5 milliGRAM(s) Oral every 12 hours  carvedilol 12.5 milliGRAM(s) Oral every 12 hours  docusate sodium 100 milliGRAM(s) Oral three times a day  furosemide   Injectable 40 milliGRAM(s) IV Push <User Schedule>  losartan 100 milliGRAM(s) Oral daily  polyethylene glycol 3350 17 Gram(s) Oral two times a day  povidone iodine 10% Swab 1 Application(s) Topical daily  ranolazine 500 milliGRAM(s) Oral two times a day  senna 2 Tablet(s) Oral at bedtime  sodium chloride 0.9% for Nebulization 3 milliLiter(s) Nebulizer four times a day  spironolactone 25 milliGRAM(s) Oral daily    MEDICATIONS  (PRN):      T(C): 36.4 (04-05-18 @ 04:31), Max: 36.9 (04-04-18 @ 20:52)  HR: 62 (04-05-18 @ 06:53) (58 - 68)  BP: 164/68 (04-05-18 @ 04:31) (142/61 - 164/68)  RR: 18 (04-05-18 @ 04:31) (18 - 20)  SpO2: 96% (04-05-18 @ 06:53) (96% - 98%)    CAPILLARY BLOOD GLUCOSE          I&O's Summary    04 Apr 2018 07:01  -  05 Apr 2018 07:00  --------------------------------------------------------  IN: 440 mL / OUT: 2475 mL / NET: -2035 mL        Allergies    latex (Unknown)  No Known Drug Allergies    Intolerances        PHYSICAL EXAM:  GENERAL: Elderly, Obese female sitting comfortably in chair  HEAD:  Atraumatic, Normocephalic  EYES: EOMI, PERRLA, conjunctiva and sclera clear  NECK: Supple, No JVD  CHEST/LUNG: Clear to auscultation bilaterally; No wheeze  HEART: Regular rate and rhythm; No murmurs, rubs, or edema  ABDOMEN: Soft, Nontender, Nondistended; Bowel sounds present  EXTREMITIES: 2+ extremity edema, improvement noted   PSYCH: Normal mood and affect, alert and oriented  NEUROLOGY: Grossly intact   SKIN: No rashes or lesions    LABS:                        8.8    3.90  )-----------( 90       ( 05 Apr 2018 07:37 )             28.4     04-05    133<L>  |  86<L>  |  11  ----------------------------<  82  3.7   |  42<H>  |  0.71    Ca    9.2      05 Apr 2018 06:12  Phos  3.9     04-05  Mg     1.9     04-05      Blood Cultures  Na      RADIOLOGY & ADDITIONAL TESTS:    Imaging:  < from: Xray Chest 1 View AP/PA (03.31.18 @ 10:58) >    IMPRESSION:  Small left pleural effusion with adjacentairspace disease; superimposed   pneumonia not excluded. Mild right basilar opacities.      EKG/Telemetry:  < from: 12 Lead ECG (03.31.18 @ 09:46) >    Ventricular Rate 63 BPM    Atrial Rate 39 BPM    QRS Duration 126 ms     ms    QTc 444 ms    R Axis 84 degrees    T Axis -67 degrees    Diagnosis Line ATRIAL FIBRILLATION  NON-SPECIFIC INTRA-VENTRICULAR CONDUCTION BLOCK  POSSIBLE INFERIOR INFARCT , AGE UNDETERMINED  ABNORMAL ECG    Tele reviewed and interpreted    Consultant(s) Notes Reviewed: Cardiology, Pulmonology, nephrology, Physical therapy    Care Discussed with Consultants/Other Providers: d/w Dr. Russell regarding diuresis    The above recommendations were discussed with the patient/family. The patient/family had all questions answered and expressed understanding of the plan.

## 2018-04-06 ENCOUNTER — APPOINTMENT (OUTPATIENT)
Dept: WOUND CARE | Facility: CLINIC | Age: 78
End: 2018-04-06

## 2018-04-06 LAB
ANION GAP SERPL CALC-SCNC: 7 MMOL/L — SIGNIFICANT CHANGE UP (ref 5–17)
BUN SERPL-MCNC: 14 MG/DL — SIGNIFICANT CHANGE UP (ref 7–23)
CALCIUM SERPL-MCNC: 9.2 MG/DL — SIGNIFICANT CHANGE UP (ref 8.4–10.5)
CHLORIDE SERPL-SCNC: 87 MMOL/L — LOW (ref 96–108)
CO2 SERPL-SCNC: 37 MMOL/L — HIGH (ref 22–31)
CREAT SERPL-MCNC: 0.85 MG/DL — SIGNIFICANT CHANGE UP (ref 0.5–1.3)
GLUCOSE SERPL-MCNC: 82 MG/DL — SIGNIFICANT CHANGE UP (ref 70–99)
HCT VFR BLD CALC: 30.5 % — LOW (ref 34.5–45)
HGB BLD-MCNC: 9.3 G/DL — LOW (ref 11.5–15.5)
MAGNESIUM SERPL-MCNC: 1.9 MG/DL — SIGNIFICANT CHANGE UP (ref 1.6–2.6)
MCHC RBC-ENTMCNC: 26.8 PG — LOW (ref 27–34)
MCHC RBC-ENTMCNC: 30.5 GM/DL — LOW (ref 32–36)
MCV RBC AUTO: 87.9 FL — SIGNIFICANT CHANGE UP (ref 80–100)
PHOSPHATE SERPL-MCNC: 3.7 MG/DL — SIGNIFICANT CHANGE UP (ref 2.5–4.5)
PLATELET # BLD AUTO: 96 K/UL — LOW (ref 150–400)
POTASSIUM SERPL-MCNC: 4 MMOL/L — SIGNIFICANT CHANGE UP (ref 3.5–5.3)
POTASSIUM SERPL-SCNC: 4 MMOL/L — SIGNIFICANT CHANGE UP (ref 3.5–5.3)
RBC # BLD: 3.47 M/UL — LOW (ref 3.8–5.2)
RBC # FLD: 15.2 % — HIGH (ref 10.3–14.5)
SODIUM SERPL-SCNC: 131 MMOL/L — LOW (ref 135–145)
WBC # BLD: 4.3 K/UL — SIGNIFICANT CHANGE UP (ref 3.8–10.5)
WBC # FLD AUTO: 4.3 K/UL — SIGNIFICANT CHANGE UP (ref 3.8–10.5)

## 2018-04-06 PROCEDURE — 99233 SBSQ HOSP IP/OBS HIGH 50: CPT | Mod: GC

## 2018-04-06 RX ORDER — METOPROLOL TARTRATE 50 MG
2.5 TABLET ORAL ONCE
Qty: 0 | Refills: 0 | Status: DISCONTINUED | OUTPATIENT
Start: 2018-04-06 | End: 2018-04-06

## 2018-04-06 RX ADMIN — RANOLAZINE 500 MILLIGRAM(S): 500 TABLET, FILM COATED, EXTENDED RELEASE ORAL at 11:01

## 2018-04-06 RX ADMIN — APIXABAN 2.5 MILLIGRAM(S): 2.5 TABLET, FILM COATED ORAL at 06:38

## 2018-04-06 RX ADMIN — Medication 40 MILLIGRAM(S): at 06:38

## 2018-04-06 RX ADMIN — APIXABAN 2.5 MILLIGRAM(S): 2.5 TABLET, FILM COATED ORAL at 17:59

## 2018-04-06 RX ADMIN — Medication 40 MILLIGRAM(S): at 14:33

## 2018-04-06 RX ADMIN — RANOLAZINE 500 MILLIGRAM(S): 500 TABLET, FILM COATED, EXTENDED RELEASE ORAL at 21:01

## 2018-04-06 RX ADMIN — Medication 1 APPLICATION(S): at 21:01

## 2018-04-06 RX ADMIN — CARVEDILOL PHOSPHATE 12.5 MILLIGRAM(S): 80 CAPSULE, EXTENDED RELEASE ORAL at 21:02

## 2018-04-06 RX ADMIN — LOSARTAN POTASSIUM 100 MILLIGRAM(S): 100 TABLET, FILM COATED ORAL at 06:38

## 2018-04-06 RX ADMIN — CARVEDILOL PHOSPHATE 12.5 MILLIGRAM(S): 80 CAPSULE, EXTENDED RELEASE ORAL at 11:01

## 2018-04-06 RX ADMIN — SPIRONOLACTONE 25 MILLIGRAM(S): 25 TABLET, FILM COATED ORAL at 06:38

## 2018-04-06 RX ADMIN — POLYETHYLENE GLYCOL 3350 17 GRAM(S): 17 POWDER, FOR SOLUTION ORAL at 06:36

## 2018-04-06 RX ADMIN — SODIUM CHLORIDE 3 MILLILITER(S): 9 INJECTION INTRAMUSCULAR; INTRAVENOUS; SUBCUTANEOUS at 21:02

## 2018-04-06 RX ADMIN — SODIUM CHLORIDE 3 MILLILITER(S): 9 INJECTION INTRAMUSCULAR; INTRAVENOUS; SUBCUTANEOUS at 17:59

## 2018-04-06 RX ADMIN — Medication 100 MILLIGRAM(S): at 14:33

## 2018-04-06 NOTE — PROGRESS NOTE ADULT - SUBJECTIVE AND OBJECTIVE BOX
ILAN JAIMES  MRN-[id number]    Chief Complaint: Patient is a 77y old  Female who presents with a chief complaint of generalized weakness (31 Mar 2018 15:30)      SUBJECTIVE / OVERNIGHT EVENTS:  Patient reports feeling better overall. Denied difficulty breathing, using Bipap at night      Review of Systems:   14 point ROS negative in detail except for the above.  Unable to evaluate ROS due to: cognitive deficits / altered mental status    MEDICATIONS  (STANDING):  apixaban 2.5 milliGRAM(s) Oral every 12 hours  carvedilol 12.5 milliGRAM(s) Oral every 12 hours  docusate sodium 100 milliGRAM(s) Oral three times a day  furosemide   Injectable 40 milliGRAM(s) IV Push <User Schedule>  losartan 100 milliGRAM(s) Oral daily  metolazone 2.5 milliGRAM(s) Oral daily  polyethylene glycol 3350 17 Gram(s) Oral two times a day  povidone iodine 10% Swab 1 Application(s) Topical daily  ranolazine 500 milliGRAM(s) Oral two times a day  senna 2 Tablet(s) Oral at bedtime  sodium chloride 0.9% for Nebulization 3 milliLiter(s) Nebulizer four times a day  spironolactone 25 milliGRAM(s) Oral daily    MEDICATIONS  (PRN):      T(C): 36.7 (04-06-18 @ 05:45), Max: 36.7 (04-05-18 @ 20:40)  HR: 56 (04-06-18 @ 11:03) (52 - 70)  BP: 155/83 (04-06-18 @ 11:03) (126/63 - 162/76)  RR: 20 (04-06-18 @ 05:45) (20 - 20)  SpO2: 100% (04-06-18 @ 08:52) (96% - 100%)    CAPILLARY BLOOD GLUCOSE    I&O's Summary    05 Apr 2018 07:01  -  06 Apr 2018 07:00  --------------------------------------------------------  IN: 500 mL / OUT: 1150 mL / NET: -650 mL    06 Apr 2018 07:01  -  06 Apr 2018 11:11  --------------------------------------------------------  IN: 120 mL / OUT: 600 mL / NET: -480 mL      Allergies    latex (Unknown)  No Known Drug Allergies    Intolerances      PHYSICAL EXAM:  GENERAL: Elderly obese female sitting comfortably   HEAD:  Atraumatic, Normocephalic  EYES: EOMI, PERRLA, conjunctiva and sclera clear  NECK: Supple, No JVD  CHEST/LUNG: Clear to auscultation bilaterally; No wheeze  HEART: Regular rate and rhythm; No murmurs, rubs, or edema  ABDOMEN: Soft, Nontender, Nondistended; Bowel sounds present  EXTREMITIES: B/l 2 + extremity edema, improved from before, small dorsal wound on left foot  PSYCH: Normal mood and affect, alert and oriented  NEUROLOGY: Grossly intact   SKIN: No rashes or lesions    LABS:                        9.3    4.30  )-----------( 96       ( 06 Apr 2018 07:33 )             30.5     04-06    131<L>  |  87<L>  |  14  ----------------------------<  82  4.0   |  37<H>  |  0.85    Ca    9.2      06 Apr 2018 06:05  Phos  3.7     04-06  Mg     1.9     04-06        Blood Cultures    Na    RADIOLOGY & ADDITIONAL TESTS:    Imaging:    < from: Xray Chest 1 View AP/PA (03.31.18 @ 10:58) >    IMPRESSION:  Small left pleural effusion with adjacentairspace disease; superimposed   pneumonia not excluded. Mild right basilar opacities.      EKG/Telemetry:  < from: 12 Lead ECG (03.31.18 @ 09:46) >  Ventricular Rate 63 BPM    Atrial Rate 39 BPM    QRS Duration 126 ms     ms    QTc 444 ms    R Axis 84 degrees    T Axis -67 degrees    Diagnosis Line ATRIAL FIBRILLATION  NON-SPECIFIC INTRA-VENTRICULAR CONDUCTION BLOCK  POSSIBLE INFERIOR INFARCT , AGE UNDETERMINED  ABNORMAL ECG      Consultant(s) Notes Reviewed:  Cardiology, pulmonology, Nephrology, Physical therapy    Care Discussed with Consultants/Other Providers: yes     The above recommendations were discussed with the patient/family. The patient/family had all questions answered and expressed understanding of the plan. ILAN JAIMES  MRN-[id number]    Chief Complaint: Patient is a 77y old  Female who presents with a chief complaint of generalized weakness (31 Mar 2018 15:30)      SUBJECTIVE / OVERNIGHT EVENTS:  Patient seen and examined at bedside. Patient reports feeling better overall. She did not use BIPAP overnight due to the fact that it was uncomfortable and she had hiccups. Patient feels she is urinating well but not as much as the first day or two. She is moving her bowels.       Review of Systems:   14 point ROS negative in detail except for the above.    MEDICATIONS  (STANDING):  apixaban 2.5 milliGRAM(s) Oral every 12 hours  carvedilol 12.5 milliGRAM(s) Oral every 12 hours  docusate sodium 100 milliGRAM(s) Oral three times a day  furosemide   Injectable 40 milliGRAM(s) IV Push <User Schedule>  losartan 100 milliGRAM(s) Oral daily  metolazone 2.5 milliGRAM(s) Oral daily  polyethylene glycol 3350 17 Gram(s) Oral two times a day  povidone iodine 10% Swab 1 Application(s) Topical daily  ranolazine 500 milliGRAM(s) Oral two times a day  senna 2 Tablet(s) Oral at bedtime  sodium chloride 0.9% for Nebulization 3 milliLiter(s) Nebulizer four times a day  spironolactone 25 milliGRAM(s) Oral daily    MEDICATIONS  (PRN):      T(C): 36.7 (04-06-18 @ 05:45), Max: 36.7 (04-05-18 @ 20:40)  HR: 56 (04-06-18 @ 11:03) (52 - 70)  BP: 155/83 (04-06-18 @ 11:03) (126/63 - 162/76)  RR: 20 (04-06-18 @ 05:45) (20 - 20)  SpO2: 100% (04-06-18 @ 08:52) (96% - 100%)    CAPILLARY BLOOD GLUCOSE    I&O's Summary    05 Apr 2018 07:01  -  06 Apr 2018 07:00  --------------------------------------------------------  IN: 500 mL / OUT: 1150 mL / NET: -650 mL    06 Apr 2018 07:01  -  06 Apr 2018 11:11  --------------------------------------------------------  IN: 120 mL / OUT: 600 mL / NET: -480 mL      Allergies    latex (Unknown)  No Known Drug Allergies    Intolerances      PHYSICAL EXAM:  GENERAL: Elderly obese female sitting comfortably   HEAD:  Atraumatic, Normocephalic  EYES: EOMI, PERRLA, conjunctiva and sclera clear  NECK: Supple, No JVD  CHEST/LUNG: Clear to auscultation bilaterally; No wheeze  HEART: Regular rate and rhythm; DC, rubs, or edema  ABDOMEN: Soft, Nontender, Nondistended; Bowel sounds present  EXTREMITIES: B/l 2 + extremity edema, improved from before, small dorsal wound on left foot  PSYCH: Normal mood and affect, alert and oriented  NEUROLOGY: Grossly intact   SKIN: No rashes or lesions    LABS:                        9.3    4.30  )-----------( 96       ( 06 Apr 2018 07:33 )             30.5     04-06    131<L>  |  87<L>  |  14  ----------------------------<  82  4.0   |  37<H>  |  0.85    Ca    9.2      06 Apr 2018 06:05  Phos  3.7     04-06  Mg     1.9     04-06        Blood Cultures    Na    RADIOLOGY & ADDITIONAL TESTS:    Imaging:    < from: Xray Chest 1 View AP/PA (03.31.18 @ 10:58) >    IMPRESSION:  Small left pleural effusion with adjacentairspace disease; superimposed   pneumonia not excluded. Mild right basilar opacities.      EKG/Telemetry:  < from: 12 Lead ECG (03.31.18 @ 09:46) >  Ventricular Rate 63 BPM    Atrial Rate 39 BPM    QRS Duration 126 ms     ms    QTc 444 ms    R Axis 84 degrees    T Axis -67 degrees    Diagnosis Line ATRIAL FIBRILLATION  NON-SPECIFIC INTRA-VENTRICULAR CONDUCTION BLOCK  POSSIBLE INFERIOR INFARCT , AGE UNDETERMINED  ABNORMAL ECG      Consultant(s) Notes Reviewed:  Cardiology, pulmonology, Nephrology    Care Discussed with Consultants/Other Providers: d/w Dr. Russell - regarding diuresis, possibility of adding metolazone    The above recommendations were discussed with the patient/family. The patient/family had all questions answered and expressed understanding of the plan.

## 2018-04-06 NOTE — PROGRESS NOTE ADULT - PROBLEM SELECTOR PLAN 2
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 Arciniega  - Patient did not use BIPAP last night, I explained to her the importance of using BIPAP at night. She agreed to try again. Will again try BIPAP tonight (patient is on bipap at home) - as at rehab cannot do AVAP - will change to BIPAP here to ensure she tolerates  -ABG monitoring if symptomatic    - pulm f/u

## 2018-04-06 NOTE — PROGRESS NOTE ADULT - SUBJECTIVE AND OBJECTIVE BOX
Patient is a 77y old  Female who presents with a chief complaint of generalized weakness (31 Mar 2018 15:30)      INTERVAL HISTORY: feels ok    TELEMETRY: no events  	  MEDICATIONS:  carvedilol 12.5 milliGRAM(s) Oral every 12 hours  furosemide   Injectable 40 milliGRAM(s) IV Push <User Schedule>  losartan 100 milliGRAM(s) Oral daily  ranolazine 500 milliGRAM(s) Oral two times a day  spironolactone 25 milliGRAM(s) Oral daily        PHYSICAL EXAM:  T(C): 36.9 (04-06-18 @ 14:38), Max: 36.9 (04-06-18 @ 14:38)  HR: 52 (04-06-18 @ 14:38) (52 - 70)  BP: 127/72 (04-06-18 @ 14:38) (126/63 - 155/83)  RR: 20 (04-06-18 @ 14:38) (20 - 20)  SpO2: 95% (04-06-18 @ 14:38) (95% - 100%)  Wt(kg): --  I&O's Summary    05 Apr 2018 07:01  -  06 Apr 2018 07:00  --------------------------------------------------------  IN: 500 mL / OUT: 1150 mL / NET: -650 mL    06 Apr 2018 07:01  -  06 Apr 2018 17:57  --------------------------------------------------------  IN: 360 mL / OUT: 1200 mL / NET: -840 mL          Appearance: Normal	  HEENT:    PERRL, EOMI	  Cardiovascular: Normal S1 S2, No JVD  Respiratory: Lungs clear to auscultation	  Psychiatry: Alert  Gastrointestinal:  Soft, Non-tender, + BS	  Skin: No rashes, No cyanosis  Extremities:  No edema of LE                                9.3    4.30  )-----------( 96       ( 06 Apr 2018 07:33 )             30.5     04-06    131<L>  |  87<L>  |  14  ----------------------------<  82  4.0   |  37<H>  |  0.85    Ca    9.2      06 Apr 2018 06:05  Phos  3.7     04-06  Mg     1.9     04-06          Labs, imaging and telemetry personally reviewed      ASSESSMENT/PLAN: 	  Assessment and Plan:   · Assessment		  77yoF w/ PMHx significant for HFrEF (LVEF = 19%) s/p AICD, CAD s/p stents w/ stable angina (pt on beta blocker and Ranolazine), Afib (on reduced dose Eliquis 2/2 "propensity for bleeding" as per family), interstitial fibrosis d/t Amiodarone toxicity, on home O2 (2.5L via NC; and intermittent NIV, unclear whether CPAP vs. BiPAP, though pt noncompliant), AAA s/p repair, who presents from home where she lives with her , w/ complaints of generalized weakness. She is being admitted for her complaints, along w/ acute on chronic respiratory failure / respiratory acidosis, and hyponatremia.      Problem/Plan - 1:  ·  Problem: Chronic systolic heart failure.  Plan: -   - c/w Lasix 40mg IV BID, inadequate response to diuresis overnight (~1L out), consider adding Metolazone 2.5mg daily  - closely monitor fluid status (strict Is/Os, daily weights, PEx), renal function, and electrolytes  - continue home Losartan 100mg daily, and Carvedilol 12.5mg BID.   - added aldactone to facilitate diuresis - monitor BMP daily        Problem/Plan - 2:  ·  Problem: Acute on chronic respiratory failure with hypoxia and hypercapnia.  Plan: - on home O2 - 2.5L -  - pt chronic CO2 retainer with bicarb of 35 as outpt one month ago  - pt appears grossly volume overloaded  - c/w diuresis with Lasix 40mg IV BID, consider adding Metolazone 2.5mg daily     Problem/Plan - 3:  ·  Problem: Hyponatremia.  Plan: likely Hypervolemic hyponatremia  C/w Diuresis and trend Na.   - renal consult appreciated       Problem/Plan - 4:  ·  Problem: Coronary artery disease of native artery of native heart with stable angina pectoris.  Plan: - continue home ARB, beta blocker as above, and Ranexa 500mg BID       Problem/Plan - 5:  ·  Problem: Atrial fibrillation.  Plan: - rate controlled  - will continue anticoagulation w/ Eliquis 2.5mg BID (home dose)  - continue beta blocker as above.   - ICD interrogated with no events           Gamaliel Russell DO [facility]  Cardiovascular Medicine  [phone] Patient is a 77y old  Female who presents with a chief complaint of generalized weakness (31 Mar 2018 15:30)      INTERVAL HISTORY: feels ok    TELEMETRY: no events  	  MEDICATIONS:  carvedilol 12.5 milliGRAM(s) Oral every 12 hours  furosemide   Injectable 40 milliGRAM(s) IV Push <User Schedule>  losartan 100 milliGRAM(s) Oral daily  ranolazine 500 milliGRAM(s) Oral two times a day  spironolactone 25 milliGRAM(s) Oral daily        PHYSICAL EXAM:  T(C): 36.9 (04-06-18 @ 14:38), Max: 36.9 (04-06-18 @ 14:38)  HR: 52 (04-06-18 @ 14:38) (52 - 70)  BP: 127/72 (04-06-18 @ 14:38) (126/63 - 155/83)  RR: 20 (04-06-18 @ 14:38) (20 - 20)  SpO2: 95% (04-06-18 @ 14:38) (95% - 100%)  Wt(kg): --  I&O's Summary    05 Apr 2018 07:01  -  06 Apr 2018 07:00  --------------------------------------------------------  IN: 500 mL / OUT: 1150 mL / NET: -650 mL    06 Apr 2018 07:01  -  06 Apr 2018 17:57  --------------------------------------------------------  IN: 360 mL / OUT: 1200 mL / NET: -840 mL          Appearance: Normal	  HEENT:    PERRL, EOMI	  Cardiovascular: Normal S1 S2, No JVD  Respiratory: Lungs clear to auscultation	  Psychiatry: Alert  Gastrointestinal:  Soft, Non-tender, + BS	  Skin: No rashes, No cyanosis  Extremities:  No edema of LE                                9.3    4.30  )-----------( 96       ( 06 Apr 2018 07:33 )             30.5     04-06    131<L>  |  87<L>  |  14  ----------------------------<  82  4.0   |  37<H>  |  0.85    Ca    9.2      06 Apr 2018 06:05  Phos  3.7     04-06  Mg     1.9     04-06          Labs, imaging and telemetry personally reviewed      ASSESSMENT/PLAN: 	  Assessment and Plan:   · Assessment		  77yoF w/ PMHx significant for HFrEF (LVEF = 19%) s/p AICD, CAD s/p stents w/ stable angina (pt on beta blocker and Ranolazine), Afib (on reduced dose Eliquis 2/2 "propensity for bleeding" as per family), interstitial fibrosis d/t Amiodarone toxicity, on home O2 (2.5L via NC; and intermittent NIV, unclear whether CPAP vs. BiPAP, though pt noncompliant), AAA s/p repair, who presents from home where she lives with her , w/ complaints of generalized weakness. She is being admitted for her complaints, along w/ acute on chronic respiratory failure / respiratory acidosis, and hyponatremia.      Problem/Plan - 1:  ·  Problem: Chronic systolic heart failure.  Plan: -   - c/w Lasix 40mg IV BID, inadequate response to diuresis overnight (~1L out), consider adding Metolazone 2.5mg daily  - closely monitor fluid status (strict Is/Os, daily weights, PEx), renal function, and electrolytes  - continue home Losartan 100mg daily, and Carvedilol 12.5mg BID.   - added aldactone to facilitate diuresis - monitor BMP daily   -TTE reviewed with Biventricular HF and severe Pulm HTN consistent with her 20+ year history of HF       Problem/Plan - 2:  ·  Problem: Acute on chronic respiratory failure with hypoxia and hypercapnia.  Plan: - on home O2 - 2.5L -  - pt chronic CO2 retainer with bicarb of 35 as outpt one month ago  - pt appears grossly volume overloaded  - c/w diuresis with Lasix 40mg IV BID, consider adding Metolazone 2.5mg daily     Problem/Plan - 3:  ·  Problem: Hyponatremia.  Plan: likely Hypervolemic hyponatremia  C/w Diuresis and trend Na.   - renal consult appreciated       Problem/Plan - 4:  ·  Problem: Coronary artery disease of native artery of native heart with stable angina pectoris.  Plan: - continue home ARB, beta blocker as above, and Ranexa 500mg BID       Problem/Plan - 5:  ·  Problem: Atrial fibrillation.  Plan: - rate controlled  - will continue anticoagulation w/ Eliquis 2.5mg BID (home dose)  - continue beta blocker as above.   - ICD interrogated with no events           Gamaliel Russell DO [facility]  Cardiovascular Medicine  [phone] Patient is a 77y old  Female who presents with a chief complaint of generalized weakness (31 Mar 2018 15:30)      INTERVAL HISTORY: feels ok    TELEMETRY: no events  	  MEDICATIONS:  carvedilol 12.5 milliGRAM(s) Oral every 12 hours  furosemide   Injectable 40 milliGRAM(s) IV Push <User Schedule>  losartan 100 milliGRAM(s) Oral daily  ranolazine 500 milliGRAM(s) Oral two times a day  spironolactone 25 milliGRAM(s) Oral daily        PHYSICAL EXAM:  T(C): 36.9 (04-06-18 @ 14:38), Max: 36.9 (04-06-18 @ 14:38)  HR: 52 (04-06-18 @ 14:38) (52 - 70)  BP: 127/72 (04-06-18 @ 14:38) (126/63 - 155/83)  RR: 20 (04-06-18 @ 14:38) (20 - 20)  SpO2: 95% (04-06-18 @ 14:38) (95% - 100%)  Wt(kg): --  I&O's Summary    05 Apr 2018 07:01  -  06 Apr 2018 07:00  --------------------------------------------------------  IN: 500 mL / OUT: 1150 mL / NET: -650 mL    06 Apr 2018 07:01  -  06 Apr 2018 17:57  --------------------------------------------------------  IN: 360 mL / OUT: 1200 mL / NET: -840 mL          Appearance: Normal	  HEENT:    PERRL, EOMI	  Cardiovascular: Normal S1 S2, No JVD  Respiratory: Lungs clear to auscultation	  Psychiatry: Alert  Gastrointestinal:  Soft, Non-tender, + BS	  Skin: No rashes, No cyanosis  Extremities:  + edema of LE                                9.3    4.30  )-----------( 96       ( 06 Apr 2018 07:33 )             30.5     04-06    131<L>  |  87<L>  |  14  ----------------------------<  82  4.0   |  37<H>  |  0.85    Ca    9.2      06 Apr 2018 06:05  Phos  3.7     04-06  Mg     1.9     04-06          Labs, imaging and telemetry personally reviewed      ASSESSMENT/PLAN: 	  Assessment and Plan:   · Assessment		  77yoF w/ PMHx significant for HFrEF (LVEF = 19%) s/p AICD, CAD s/p stents w/ stable angina (pt on beta blocker and Ranolazine), Afib (on reduced dose Eliquis 2/2 "propensity for bleeding" as per family), interstitial fibrosis d/t Amiodarone toxicity, on home O2 (2.5L via NC; and intermittent NIV, unclear whether CPAP vs. BiPAP, though pt noncompliant), AAA s/p repair, who presents from home where she lives with her , w/ complaints of generalized weakness. She is being admitted for her complaints, along w/ acute on chronic respiratory failure / respiratory acidosis, and hyponatremia.      Problem/Plan - 1:  ·  Problem: Chronic systolic heart failure.  Plan: -   - c/w Lasix 40mg IV BID, inadequate response to diuresis overnight (~1L out), consider adding Metolazone 2.5mg daily  - closely monitor fluid status (strict Is/Os, daily weights, PEx), renal function, and electrolytes  - continue home Losartan 100mg daily, and Carvedilol 12.5mg BID.   - added aldactone to facilitate diuresis - monitor BMP daily   -TTE reviewed with Biventricular HF and severe Pulm HTN consistent with her 20+ year history of HF       Problem/Plan - 2:  ·  Problem: Acute on chronic respiratory failure with hypoxia and hypercapnia.  Plan: - on home O2 - 2.5L -  - pt chronic CO2 retainer with bicarb of 35 as outpt one month ago  - pt appears grossly volume overloaded  - c/w diuresis with Lasix 40mg IV BID, consider adding Metolazone 2.5mg daily     Problem/Plan - 3:  ·  Problem: Hyponatremia.  Plan: likely Hypervolemic hyponatremia  C/w Diuresis and trend Na.   - renal consult appreciated       Problem/Plan - 4:  ·  Problem: Coronary artery disease of native artery of native heart with stable angina pectoris.  Plan: - continue home ARB, beta blocker as above, and Ranexa 500mg BID       Problem/Plan - 5:  ·  Problem: Atrial fibrillation.  Plan: - rate controlled  - will continue anticoagulation w/ Eliquis 2.5mg BID (home dose)  - continue beta blocker as above.   - ICD interrogated with no events           Gamaliel Russell DO [facility]  Cardiovascular Medicine  [phone]

## 2018-04-06 NOTE — PROGRESS NOTE ADULT - PROBLEM SELECTOR PLAN 1
-Appreciate cardiology consult  -c/w Lasix 40mg BID IV; suboptimal diuresis-add Metolazone 2.5 daily   -monitor on tele for close monitoring - frequent PVCs  -closely monitor fluid status (strict Is/Os, daily weights, PEx), renal function, and electrolytes  - continue home Losartan 100mg daily, and Carvedilol 12.5mg BID  strict I's and O's  Started on aldactone 25mg daily for biventricular failure as well as to try to improve metabolic alkalosis, monitor K  TTE pending - as last one was in September -c/w Lasix 40mg BID IV; suboptimal diuresis- possibly add Metolazone 2.5 daily   -monitor on tele - afib v paced with PVCs  -closely monitor fluid status (strict Is/Os, daily weights, PEx), renal function, and electrolytes  - continue home Losartan 100mg daily, and Carvedilol 12.5mg BID  strict I's and O's  Started on aldactone 25mg daily for biventricular failure as well as to try to improve metabolic alkalosis, monitor K  2D echo noted - bi ventricular failure, severe pulm HTN - cardiology to review

## 2018-04-06 NOTE — PROGRESS NOTE ADULT - ASSESSMENT
77yoF w/ PMHx significant for HFrEF (LVEF = 19%) s/p AICD, CAD s/p stents w/ stable angina (pt on beta blocker and Ranolazine), Afib (on reduced dose Eliquis 2/2 "propensity for bleeding" as per family), interstitial fibrosis d/t Amiodarone toxicity, on home O2 (2.5L via NC; and intermittent NIV, unclear whether CPAP vs. BiPAP, though pt noncompliant), AAA s/p repair, a/w acute on chronic respiratory failure due to pulmonary fibrosis and acute on chronic systolic heart failure, respiratory acidosis, and hyponatremia. Patient now on BIPAP only at night, diuresing well on the IV lasix 40mg bid, patient continues to need IV diuresis, patient with chronic metabolic alkalosis now with slight worsening - started on aldactone 77yoF w/ PMHx significant for HFrEF (LVEF = 19%) s/p AICD, CAD s/p stents w/ stable angina (pt on beta blocker and Ranolazine), Afib (on reduced dose Eliquis 2/2 "propensity for bleeding" as per family), interstitial fibrosis d/t Amiodarone toxicity, on home O2 (2.5L via NC; and intermittent NIV, unclear whether CPAP vs. BiPAP, though pt noncompliant), AAA s/p repair, a/w acute on chronic respiratory failure due to pulmonary fibrosis and acute on chronic systolic heart failure, respiratory acidosis, and hyponatremia. Patient now on BIPAP only at night, diuresing well on the IV lasix 40mg bid, patient continues to need IV diuresis, patient with chronic metabolic alkalosis now with slight worsening - started on aldactone 4/4

## 2018-04-06 NOTE — PROGRESS NOTE ADULT - SUBJECTIVE AND OBJECTIVE BOX
PULMONARY      CHF exacerbation, hypercapnic resp failure    Still not able to tolerate BIPAP    chronic lower extremity peripheral edema      on spironolactone, furosemide  apixaban  losartan Ranexa    HR 6o  /83 afebrile      LUNGS:  equal diminished at bases, unlabored    COR RR no murmur    abd protuberant soft nt non hsm    EXTR  moderate LE edema  skin chronic changes    neuro A OX 3    LABS    WBC 4.3  HGB  9.8  plat 96    N131  creat 0..85  bun 14  bicarb 37  Na 133        IMPR  hypercapnic resp failure    CHF, low EF    lower edema    REC    continue diuretic furosemide and spironolactone  on ranexa, caevedilol losartan    monitor compensatory alkalosis      repeat CXR    continue BIPAP    please ask respiratory to continue to try to adjust BIPAP as she has hypercapnia    You may stop nebullized saline      MD Obi Choi MD Chris Iakovou, MD    [facility]    [phone]

## 2018-04-06 NOTE — PROGRESS NOTE ADULT - PROBLEM SELECTOR PLAN 9
C/w Colace. Miralax x1 dose s/p suppository - had 3 BMs yesterday c/w colace, miralax PRN, moving bowels

## 2018-04-07 LAB
ANION GAP SERPL CALC-SCNC: 8 MMOL/L — SIGNIFICANT CHANGE UP (ref 5–17)
BUN SERPL-MCNC: 16 MG/DL — SIGNIFICANT CHANGE UP (ref 7–23)
CALCIUM SERPL-MCNC: 9.2 MG/DL — SIGNIFICANT CHANGE UP (ref 8.4–10.5)
CHLORIDE SERPL-SCNC: 85 MMOL/L — LOW (ref 96–108)
CO2 SERPL-SCNC: 39 MMOL/L — HIGH (ref 22–31)
CREAT SERPL-MCNC: 0.78 MG/DL — SIGNIFICANT CHANGE UP (ref 0.5–1.3)
GLUCOSE SERPL-MCNC: 82 MG/DL — SIGNIFICANT CHANGE UP (ref 70–99)
HCT VFR BLD CALC: 30.1 % — LOW (ref 34.5–45)
HGB BLD-MCNC: 9.1 G/DL — LOW (ref 11.5–15.5)
MAGNESIUM SERPL-MCNC: 2 MG/DL — SIGNIFICANT CHANGE UP (ref 1.6–2.6)
MCHC RBC-ENTMCNC: 26.6 PG — LOW (ref 27–34)
MCHC RBC-ENTMCNC: 30.2 GM/DL — LOW (ref 32–36)
MCV RBC AUTO: 88 FL — SIGNIFICANT CHANGE UP (ref 80–100)
PHOSPHATE SERPL-MCNC: 3.4 MG/DL — SIGNIFICANT CHANGE UP (ref 2.5–4.5)
PLATELET # BLD AUTO: 90 K/UL — LOW (ref 150–400)
POTASSIUM SERPL-MCNC: 3.8 MMOL/L — SIGNIFICANT CHANGE UP (ref 3.5–5.3)
POTASSIUM SERPL-SCNC: 3.8 MMOL/L — SIGNIFICANT CHANGE UP (ref 3.5–5.3)
RBC # BLD: 3.42 M/UL — LOW (ref 3.8–5.2)
RBC # FLD: 15.2 % — HIGH (ref 10.3–14.5)
SODIUM SERPL-SCNC: 132 MMOL/L — LOW (ref 135–145)
WBC # BLD: 4.23 K/UL — SIGNIFICANT CHANGE UP (ref 3.8–10.5)
WBC # FLD AUTO: 4.23 K/UL — SIGNIFICANT CHANGE UP (ref 3.8–10.5)

## 2018-04-07 PROCEDURE — 99233 SBSQ HOSP IP/OBS HIGH 50: CPT

## 2018-04-07 RX ADMIN — Medication 40 MILLIGRAM(S): at 15:21

## 2018-04-07 RX ADMIN — Medication 40 MILLIGRAM(S): at 05:47

## 2018-04-07 RX ADMIN — CARVEDILOL PHOSPHATE 12.5 MILLIGRAM(S): 80 CAPSULE, EXTENDED RELEASE ORAL at 11:54

## 2018-04-07 RX ADMIN — APIXABAN 2.5 MILLIGRAM(S): 2.5 TABLET, FILM COATED ORAL at 05:47

## 2018-04-07 RX ADMIN — SPIRONOLACTONE 25 MILLIGRAM(S): 25 TABLET, FILM COATED ORAL at 05:47

## 2018-04-07 RX ADMIN — LOSARTAN POTASSIUM 100 MILLIGRAM(S): 100 TABLET, FILM COATED ORAL at 05:47

## 2018-04-07 RX ADMIN — RANOLAZINE 500 MILLIGRAM(S): 500 TABLET, FILM COATED, EXTENDED RELEASE ORAL at 11:54

## 2018-04-07 RX ADMIN — APIXABAN 2.5 MILLIGRAM(S): 2.5 TABLET, FILM COATED ORAL at 18:51

## 2018-04-07 RX ADMIN — POLYETHYLENE GLYCOL 3350 17 GRAM(S): 17 POWDER, FOR SOLUTION ORAL at 05:47

## 2018-04-07 RX ADMIN — RANOLAZINE 500 MILLIGRAM(S): 500 TABLET, FILM COATED, EXTENDED RELEASE ORAL at 21:11

## 2018-04-07 RX ADMIN — CARVEDILOL PHOSPHATE 12.5 MILLIGRAM(S): 80 CAPSULE, EXTENDED RELEASE ORAL at 21:11

## 2018-04-07 NOTE — PROGRESS NOTE ADULT - PROBLEM SELECTOR PLAN 2
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 Arciniega  - Patient did not use BIPAP last night, I explained to her the importance of using BIPAP at night. She agreed to try again. Will again try BIPAP tonight (patient is on bipap at home) - as at rehab cannot do AVAP - will change to BIPAP here to ensure she tolerates  -ABG monitoring if symptomatic    - pulm f/u

## 2018-04-07 NOTE — PROGRESS NOTE ADULT - PROBLEM SELECTOR PLAN 1
Breathing improving on current meds  -c/w Lasix 40mg BID IV; added Metolazone 2.5 daily   -monitor on tele - afib   -closely monitor fluid status (strict Is/Os, daily weights, PEx), renal function, and electrolytes  - continue home Losartan 100mg daily, and Carvedilol 12.5mg BID  strict I's and O's  Started on aldactone 25mg daily for biventricular failure as well as to try to improve metabolic alkalosis, monitor K  Reviewed 2D echo report- bi ventricular failure, severe pulm HTN   - cardiology plan noted

## 2018-04-07 NOTE — PROGRESS NOTE ADULT - ASSESSMENT
77yoF w/ PMHx significant for HFrEF (LVEF = 19%) s/p AICD, CAD s/p stents w/ stable angina (pt on beta blocker and Ranolazine), Afib (on reduced dose Eliquis 2/2 "propensity for bleeding" as per family), interstitial fibrosis d/t Amiodarone toxicity, on home O2 (2.5L via NC; and intermittent NIV, unclear whether CPAP vs. BiPAP, though pt noncompliant), AAA s/p repair, a/w acute on chronic respiratory failure due to pulmonary fibrosis and acute on chronic systolic heart failure, respiratory acidosis, and hyponatremia. Patient now on BIPAP only at night, diuresing well on the IV lasix 40mg bid, patient continues to need IV diuresis, patient with chronic metabolic alkalosis now with slight worsening - started on aldactone 4/4

## 2018-04-07 NOTE — PROGRESS NOTE ADULT - SUBJECTIVE AND OBJECTIVE BOX
Patient is a 77y old  Female who presents with a chief complaint of generalized weakness    SUBJECTIVE / OVERNIGHT EVENTS:  Feels lot better with BiPaP, diuresis. Afebrile, no chest pain      MEDICATIONS  (STANDING):  apixaban 2.5 milliGRAM(s) Oral every 12 hours  carvedilol 12.5 milliGRAM(s) Oral every 12 hours  docusate sodium 100 milliGRAM(s) Oral three times a day  furosemide   Injectable 40 milliGRAM(s) IV Push <User Schedule>  losartan 100 milliGRAM(s) Oral daily  metolazone 2.5 milliGRAM(s) Oral daily  polyethylene glycol 3350 17 Gram(s) Oral two times a day  povidone iodine 10% Swab 1 Application(s) Topical daily  ranolazine 500 milliGRAM(s) Oral two times a day  senna 2 Tablet(s) Oral at bedtime  sodium chloride 0.9% for Nebulization 3 milliLiter(s) Nebulizer four times a day  spironolactone 25 milliGRAM(s) Oral daily    MEDICATIONS  (PRN):      Vital Signs Last 24 Hrs  T(C): 36.6 (07 Apr 2018 05:00), Max: 36.9 (06 Apr 2018 14:38)  T(F): 97.9 (07 Apr 2018 05:00), Max: 98.4 (06 Apr 2018 14:38)  HR: 57 (07 Apr 2018 09:15) (52 - 57)  BP: 133/60 (07 Apr 2018 05:00) (119/53 - 133/60)  BP(mean): --  RR: 18 (07 Apr 2018 05:00) (17 - 20)  SpO2: 96% (07 Apr 2018 09:15) (95% - 100%)  CAPILLARY BLOOD GLUCOSE        I&O's Summary    06 Apr 2018 07:01  -  07 Apr 2018 07:00  --------------------------------------------------------  IN: 840 mL / OUT: 3050 mL / NET: -2210 mL    07 Apr 2018 07:01  -  07 Apr 2018 12:24  --------------------------------------------------------  IN: 520 mL / OUT: 1000 mL / NET: -480 mL        PHYSICAL EXAM:-  GENERAL: NAD, well-developed  EYES: EOMI, PERRLA, conjunctiva and sclera clear  NECK: Supple, No JVD, no thyromegaly  CHEST/LUNG: Clear to auscultation bilaterally; No wheeze  HEART: Regular rate and rhythm; S1, S2 audible, No murmurs, rubs, or gallops  ABDOMEN: Soft, Nontender, Nondistended; Bowel sounds present  EXTREMITIES:  2+ Peripheral Pulses, No clubbing, cyanosis, or edema  NEURO: AAOx3, no focal deficit      LABS:                        9.1    4.23  )-----------( 90       ( 07 Apr 2018 07:06 )             30.1     04-07    132<L>  |  85<L>  |  16  ----------------------------<  82  3.8   |  39<H>  |  0.78    Ca    9.2      07 Apr 2018 05:47  Phos  3.4     04-07  Mg     2.0     04-07      RADIOLOGY & ADDITIONAL TESTS:    Imaging Personally Reviewed:  Consultant(s) Notes Reviewed:    Care Discussed with Consultants/Other Providers: Patient is a 77y old  Female who presents with a chief complaint of generalized weakness    SUBJECTIVE / OVERNIGHT EVENTS:  Feels lot better with BiPaP, diuresis. Afebrile, no chest pain      MEDICATIONS  (STANDING):  apixaban 2.5 milliGRAM(s) Oral every 12 hours  carvedilol 12.5 milliGRAM(s) Oral every 12 hours  docusate sodium 100 milliGRAM(s) Oral three times a day  furosemide   Injectable 40 milliGRAM(s) IV Push <User Schedule>  losartan 100 milliGRAM(s) Oral daily  metolazone 2.5 milliGRAM(s) Oral daily  polyethylene glycol 3350 17 Gram(s) Oral two times a day  povidone iodine 10% Swab 1 Application(s) Topical daily  ranolazine 500 milliGRAM(s) Oral two times a day  senna 2 Tablet(s) Oral at bedtime  sodium chloride 0.9% for Nebulization 3 milliLiter(s) Nebulizer four times a day  spironolactone 25 milliGRAM(s) Oral daily    MEDICATIONS  (PRN):      Vital Signs Last 24 Hrs  T(C): 36.6 (07 Apr 2018 05:00), Max: 36.9 (06 Apr 2018 14:38)  T(F): 97.9 (07 Apr 2018 05:00), Max: 98.4 (06 Apr 2018 14:38)  HR: 57 (07 Apr 2018 09:15) (52 - 57)  BP: 133/60 (07 Apr 2018 05:00) (119/53 - 133/60)  BP(mean): --  RR: 18 (07 Apr 2018 05:00) (17 - 20)  SpO2: 96% (07 Apr 2018 09:15) (95% - 100%)  CAPILLARY BLOOD GLUCOSE        I&O's Summary    06 Apr 2018 07:01  -  07 Apr 2018 07:00  --------------------------------------------------------  IN: 840 mL / OUT: 3050 mL / NET: -2210 mL    07 Apr 2018 07:01  -  07 Apr 2018 12:24  --------------------------------------------------------  IN: 520 mL / OUT: 1000 mL / NET: -480 mL        PHYSICAL EXAM:-  GENERAL: NAD, well-developed  EYES: EOMI, PERRLA, conjunctiva and sclera clear  NECK: Supple, No JVD, no thyromegaly  CHEST/LUNG: B/L rhonchi, No wheeze  HEART: Regular rate and rhythm; S1, S2 audible, No murmurs, rubs, or gallops  ABDOMEN: Soft, Nontender, Nondistended; Bowel sounds present  EXTREMITIES:  2+ Peripheral Pulses or 2+edema  NEURO: AAOx3, no focal deficit      LABS:                        9.1    4.23  )-----------( 90       ( 07 Apr 2018 07:06 )             30.1     04-07    132<L>  |  85<L>  |  16  ----------------------------<  82  3.8   |  39<H>  |  0.78    Ca    9.2      07 Apr 2018 05:47  Phos  3.4     04-07  Mg     2.0     04-07      RADIOLOGY & ADDITIONAL TESTS:    Imaging Personally Reviewed:  Consultant(s) Notes Reviewed:    Care Discussed with Consultants/Other Providers:

## 2018-04-08 LAB
ANION GAP SERPL CALC-SCNC: 13 MMOL/L — SIGNIFICANT CHANGE UP (ref 5–17)
BUN SERPL-MCNC: 17 MG/DL — SIGNIFICANT CHANGE UP (ref 7–23)
CALCIUM SERPL-MCNC: 9.6 MG/DL — SIGNIFICANT CHANGE UP (ref 8.4–10.5)
CHLORIDE SERPL-SCNC: 80 MMOL/L — LOW (ref 96–108)
CHLORIDE UR-SCNC: 86 MMOL/L — SIGNIFICANT CHANGE UP
CO2 SERPL-SCNC: 35 MMOL/L — HIGH (ref 22–31)
CREAT ?TM UR-MCNC: 9 MG/DL — SIGNIFICANT CHANGE UP
CREAT SERPL-MCNC: 0.81 MG/DL — SIGNIFICANT CHANGE UP (ref 0.5–1.3)
GLUCOSE SERPL-MCNC: 85 MG/DL — SIGNIFICANT CHANGE UP (ref 70–99)
POTASSIUM SERPL-MCNC: 4 MMOL/L — SIGNIFICANT CHANGE UP (ref 3.5–5.3)
POTASSIUM SERPL-SCNC: 4 MMOL/L — SIGNIFICANT CHANGE UP (ref 3.5–5.3)
POTASSIUM UR-SCNC: 11 MMOL/L — SIGNIFICANT CHANGE UP
SODIUM SERPL-SCNC: 128 MMOL/L — LOW (ref 135–145)
SODIUM UR-SCNC: 105 MMOL/L — SIGNIFICANT CHANGE UP

## 2018-04-08 PROCEDURE — 99233 SBSQ HOSP IP/OBS HIGH 50: CPT

## 2018-04-08 RX ADMIN — RANOLAZINE 500 MILLIGRAM(S): 500 TABLET, FILM COATED, EXTENDED RELEASE ORAL at 21:28

## 2018-04-08 RX ADMIN — APIXABAN 2.5 MILLIGRAM(S): 2.5 TABLET, FILM COATED ORAL at 16:33

## 2018-04-08 RX ADMIN — RANOLAZINE 500 MILLIGRAM(S): 500 TABLET, FILM COATED, EXTENDED RELEASE ORAL at 12:29

## 2018-04-08 RX ADMIN — CARVEDILOL PHOSPHATE 12.5 MILLIGRAM(S): 80 CAPSULE, EXTENDED RELEASE ORAL at 12:28

## 2018-04-08 RX ADMIN — APIXABAN 2.5 MILLIGRAM(S): 2.5 TABLET, FILM COATED ORAL at 05:52

## 2018-04-08 RX ADMIN — Medication 100 MILLIGRAM(S): at 05:17

## 2018-04-08 RX ADMIN — CARVEDILOL PHOSPHATE 12.5 MILLIGRAM(S): 80 CAPSULE, EXTENDED RELEASE ORAL at 21:28

## 2018-04-08 RX ADMIN — Medication 40 MILLIGRAM(S): at 05:16

## 2018-04-08 RX ADMIN — Medication 40 MILLIGRAM(S): at 16:32

## 2018-04-08 RX ADMIN — LOSARTAN POTASSIUM 100 MILLIGRAM(S): 100 TABLET, FILM COATED ORAL at 05:53

## 2018-04-08 RX ADMIN — SPIRONOLACTONE 25 MILLIGRAM(S): 25 TABLET, FILM COATED ORAL at 06:45

## 2018-04-08 NOTE — PROGRESS NOTE ADULT - PROBLEM SELECTOR PLAN 6
Chronic, lower than baseline of 110-130s  - no signs/sx of bleeding/bruising  - will continue to monitor

## 2018-04-08 NOTE — PROGRESS NOTE ADULT - PROBLEM SELECTOR PLAN 1
Feels ok, No chest pain, Breathing improving on current meds  - Cardiology plan noted  -c/w Lasix 40mg BID IV, Aldactone 100mg daily, Metolazone 2.5 daily, Losartan 100mg daily, and Carvedilol 12.5mg BID  -monitor on tele - afib   -closely monitor fluid status (strict Is/Os, daily weights, PEx), renal function, and electrolytes Feels ok, No chest pain, Breathing improving on current meds  - Cardiology plan noted  -c/w Lasix 40mg BID IV, Aldactone 25mg daily, Metolazone 2.5 daily, Losartan 100mg daily, and Carvedilol 12.5mg BID  -monitor on tele - afib   -closely monitor fluid status (strict Is/Os, daily weights, PEx), renal function, and electrolytes

## 2018-04-08 NOTE — PROGRESS NOTE ADULT - PROBLEM SELECTOR PLAN 4
Na 132 today. Hypervolemic hyponatremia, overall trend improving with diuresis   C/w Diuresis. Na 128 today. Hypervolemic hyponatremia and possibly related to diuretic now.   - C/w Diuresis.  - Serum and urine osm, TSH, urine lytes ordered.  - Fluid restriction 800ml/day

## 2018-04-08 NOTE — PROGRESS NOTE ADULT - SUBJECTIVE AND OBJECTIVE BOX
Patient is a 77y old  Female who presents with a chief complaint of generalized weakness.    SUBJECTIVE / OVERNIGHT EVENTS:  Sitting in chair, no respiratory distress, improving LE swelling, afebrile      MEDICATIONS  (STANDING):  apixaban 2.5 milliGRAM(s) Oral every 12 hours  carvedilol 12.5 milliGRAM(s) Oral every 12 hours  docusate sodium 100 milliGRAM(s) Oral three times a day  furosemide   Injectable 40 milliGRAM(s) IV Push <User Schedule>  losartan 100 milliGRAM(s) Oral daily  metolazone 2.5 milliGRAM(s) Oral daily  polyethylene glycol 3350 17 Gram(s) Oral two times a day  povidone iodine 10% Swab 1 Application(s) Topical daily  ranolazine 500 milliGRAM(s) Oral two times a day  senna 2 Tablet(s) Oral at bedtime  sodium chloride 0.9% for Nebulization 3 milliLiter(s) Nebulizer four times a day  spironolactone 25 milliGRAM(s) Oral daily    MEDICATIONS  (PRN):      Vital Signs Last 24 Hrs  T(C): 36.7 (08 Apr 2018 12:27), Max: 36.7 (08 Apr 2018 00:35)  T(F): 98 (08 Apr 2018 12:27), Max: 98 (08 Apr 2018 00:35)  HR: 52 (08 Apr 2018 12:27) (52 - 74)  BP: 161/63 (08 Apr 2018 12:27) (116/52 - 161/63)  BP(mean): --  RR: 18 (08 Apr 2018 12:27) (18 - 18)  SpO2: 100% (08 Apr 2018 12:27) (96% - 100%)  CAPILLARY BLOOD GLUCOSE    I&O's Summary    07 Apr 2018 07:01  -  08 Apr 2018 07:00  --------------------------------------------------------  IN: 1651 mL / OUT: 3200 mL / NET: -1549 mL    08 Apr 2018 07:01  -  08 Apr 2018 16:09  --------------------------------------------------------  IN: 360 mL / OUT: 1020 mL / NET: -660 mL        PHYSICAL EXAM:-  GENERAL: NAD, well-developed, no distress  EYES: EOMI, PERRLA, conjunctiva and sclera clear  NECK: Supple, No JVD, no thyromegaly  CHEST/LUNG: Rhonchi to auscultation bilaterally; No wheeze  HEART: Regular rate and rhythm; S1, S2 audible, No murmurs, rubs, or gallops  ABDOMEN: Soft, Nontender, Nondistended; Bowel sounds present  EXTREMITIES:  2+ Peripheral Pulses, No clubbing, cyanosis, 2+ edema LE  NEURO: AAOx3, no focal deficit      LABS:                        9.1    4.23  )-----------( 90       ( 07 Apr 2018 07:06 )             30.1     04-08    128<L>  |  80<L>  |  17  ----------------------------<  85  4.0   |  35<H>  |  0.81    Ca    9.6      08 Apr 2018 06:52  Phos  3.4     04-07  Mg     2.0     04-07    RADIOLOGY & ADDITIONAL TESTS:    Imaging Personally Reviewed: CXR, Echo  Consultant(s) Notes Reviewed:  Card  Care Discussed with Consultants/Other Providers: card

## 2018-04-08 NOTE — PROGRESS NOTE ADULT - SUBJECTIVE AND OBJECTIVE BOX
Patient is a 77y old  Female who presents with a chief complaint of generalized weakness (31 Mar 2018 15:30)      INTERVAL HISTORY: feels better    TELEMETRY: no events  	  MEDICATIONS:  carvedilol 12.5 milliGRAM(s) Oral every 12 hours  furosemide   Injectable 40 milliGRAM(s) IV Push <User Schedule>  losartan 100 milliGRAM(s) Oral daily  metolazone 2.5 milliGRAM(s) Oral daily  ranolazine 500 milliGRAM(s) Oral two times a day  spironolactone 25 milliGRAM(s) Oral daily        PHYSICAL EXAM:  T(C): 36.7 (04-08-18 @ 12:27), Max: 36.7 (04-08-18 @ 00:35)  HR: 52 (04-08-18 @ 12:27) (52 - 77)  BP: 161/63 (04-08-18 @ 12:27) (116/52 - 161/63)  RR: 18 (04-08-18 @ 12:27) (18 - 18)  SpO2: 100% (04-08-18 @ 12:27) (96% - 100%)  Wt(kg): --  I&O's Summary    07 Apr 2018 07:01  -  08 Apr 2018 07:00  --------------------------------------------------------  IN: 1651 mL / OUT: 3200 mL / NET: -1549 mL    08 Apr 2018 07:01  -  08 Apr 2018 20:39  --------------------------------------------------------  IN: 834 mL / OUT: 1420 mL / NET: -586 mL          Appearance: Normal	  HEENT:    PERRL, EOMI	  Cardiovascular: Normal S1 S2, No JVD  Respiratory: Lungs clear to auscultation	  Psychiatry: Alert  Gastrointestinal:  Soft, Non-tender, + BS	  Skin: No rashes, No cyanosis  Extremities:  No edema of LE                                9.1    4.23  )-----------( 90       ( 07 Apr 2018 07:06 )             30.1     04-08    128<L>  |  80<L>  |  17  ----------------------------<  85  4.0   |  35<H>  |  0.81    Ca    9.6      08 Apr 2018 06:52  Phos  3.4     04-07  Mg     2.0     04-07          Labs, imaging and telemetry personally reviewed    	  Assessment and Plan:   · Assessment		  77yoF w/ PMHx significant for HFrEF (LVEF = 19%) s/p AICD, CAD s/p stents w/ stable angina (pt on beta blocker and Ranolazine), Afib (on reduced dose Eliquis 2/2 "propensity for bleeding" as per family), interstitial fibrosis d/t Amiodarone toxicity, on home O2 (2.5L via NC; and intermittent NIV, unclear whether CPAP vs. BiPAP, though pt noncompliant), AAA s/p repair, who presents from home where she lives with her , w/ complaints of generalized weakness. She is being admitted for her complaints, along w/ acute on chronic respiratory failure / respiratory acidosis, and hyponatremia.      Problem/Plan - 1:  ·  Problem: Chronic systolic heart failure.  Plan: -   - c/w Lasix 40mg IV BID, inadequate response to diuresis overnight (~1L out), consider increasing Metolazone to 2.5mg BID  Monday                                     - closely monitor fluid status (strict Is/Os, daily weights, PEx), renal function, and electrolytes  - So far pt has lost 15 pounds since admission   - continue home Losartan 100mg daily, and Carvedilol 12.5mg BID.   - added aldactone to facilitate diuresis - monitor BMP daily   -TTE reviewed with Biventricular HF and severe Pulm HTN consistent with her 20+ year history of HF       Problem/Plan - 2:  ·  Problem: Acute on chronic respiratory failure with hypoxia and hypercapnia.  Plan: - on home O2 - 2.5L -  - pt chronic CO2 retainer with bicarb of 35 as outpt one month ago  - pt appears grossly volume overloaded  - c/w diuresis with Lasix 40mg IV BID, consider increasing Metolazone to 2.5mg BID  Monday      Problem/Plan - 3:  ·  Problem: Hyponatremia.  Plan: likely Hypervolemic hyponatremia  C/w Diuresis and trend Na.   - renal consult appreciated       Problem/Plan - 4:  ·  Problem: Coronary artery disease of native artery of native heart with stable angina pectoris.  Plan: - continue home ARB, beta blocker as above, and Ranexa 500mg BID       Problem/Plan - 5:  ·  Problem: Atrial fibrillation.  Plan: - rate controlled  - will continue anticoagulation w/ Eliquis 2.5mg BID (home dose)  - continue beta blocker as above.   - ICD interrogated with no events         Gamaliel Russell DO [facility]  Cardiovascular Medicine  [phone] Patient is a 77y old  Female who presents with a chief complaint of generalized weakness (31 Mar 2018 15:30)      INTERVAL HISTORY: feels better    TELEMETRY: no events  	  MEDICATIONS:  carvedilol 12.5 milliGRAM(s) Oral every 12 hours  furosemide   Injectable 40 milliGRAM(s) IV Push <User Schedule>  losartan 100 milliGRAM(s) Oral daily  metolazone 2.5 milliGRAM(s) Oral daily  ranolazine 500 milliGRAM(s) Oral two times a day  spironolactone 25 milliGRAM(s) Oral daily        PHYSICAL EXAM:  T(C): 36.7 (04-08-18 @ 12:27), Max: 36.7 (04-08-18 @ 00:35)  HR: 52 (04-08-18 @ 12:27) (52 - 77)  BP: 161/63 (04-08-18 @ 12:27) (116/52 - 161/63)  RR: 18 (04-08-18 @ 12:27) (18 - 18)  SpO2: 100% (04-08-18 @ 12:27) (96% - 100%)  Wt(kg): --  I&O's Summary    07 Apr 2018 07:01  -  08 Apr 2018 07:00  --------------------------------------------------------  IN: 1651 mL / OUT: 3200 mL / NET: -1549 mL    08 Apr 2018 07:01  -  08 Apr 2018 20:39  --------------------------------------------------------  IN: 834 mL / OUT: 1420 mL / NET: -586 mL          Appearance: Normal	  HEENT:    PERRL, EOMI	  Cardiovascular: Normal S1 S2, No JVD  Respiratory: Lungs clear to auscultation	  Psychiatry: Alert  Gastrointestinal:  Soft, Non-tender, + BS	  Skin: No rashes, No cyanosis  Extremities:  + edema of LE                                9.1    4.23  )-----------( 90       ( 07 Apr 2018 07:06 )             30.1     04-08    128<L>  |  80<L>  |  17  ----------------------------<  85  4.0   |  35<H>  |  0.81    Ca    9.6      08 Apr 2018 06:52  Phos  3.4     04-07  Mg     2.0     04-07          Labs, imaging and telemetry personally reviewed    	  Assessment and Plan:   · Assessment		  77yoF w/ PMHx significant for HFrEF (LVEF = 19%) s/p AICD, CAD s/p stents w/ stable angina (pt on beta blocker and Ranolazine), Afib (on reduced dose Eliquis 2/2 "propensity for bleeding" as per family), interstitial fibrosis d/t Amiodarone toxicity, on home O2 (2.5L via NC; and intermittent NIV, unclear whether CPAP vs. BiPAP, though pt noncompliant), AAA s/p repair, who presents from home where she lives with her , w/ complaints of generalized weakness. She is being admitted for her complaints, along w/ acute on chronic respiratory failure / respiratory acidosis, and hyponatremia.      Problem/Plan - 1:  ·  Problem: Chronic systolic heart failure.  Plan: -   - c/w Lasix 40mg IV BID, inadequate response to diuresis overnight (~1L out), consider increasing Metolazone to 2.5mg BID  Monday                                     - closely monitor fluid status (strict Is/Os, daily weights, PEx), renal function, and electrolytes  - So far pt has lost 15 pounds since admission   - continue home Losartan 100mg daily, and Carvedilol 12.5mg BID.   - added aldactone to facilitate diuresis - monitor BMP daily   -TTE reviewed with Biventricular HF and severe Pulm HTN consistent with her 20+ year history of HF       Problem/Plan - 2:  ·  Problem: Acute on chronic respiratory failure with hypoxia and hypercapnia.  Plan: - on home O2 - 2.5L -  - pt chronic CO2 retainer with bicarb of 35 as outpt one month ago  - pt appears grossly volume overloaded  - c/w diuresis with Lasix 40mg IV BID, consider increasing Metolazone to 2.5mg BID  Monday      Problem/Plan - 3:  ·  Problem: Hyponatremia.  Plan: likely Hypervolemic hyponatremia  C/w Diuresis and trend Na.   - renal consult appreciated       Problem/Plan - 4:  ·  Problem: Coronary artery disease of native artery of native heart with stable angina pectoris.  Plan: - continue home ARB, beta blocker as above, and Ranexa 500mg BID       Problem/Plan - 5:  ·  Problem: Atrial fibrillation.  Plan: - rate controlled  - will continue anticoagulation w/ Eliquis 2.5mg BID (home dose)  - continue beta blocker as above.   - ICD interrogated with no events         Gamaliel Russell DO [facility]  Cardiovascular Medicine  [phone]

## 2018-04-09 DIAGNOSIS — E87.3 ALKALOSIS: ICD-10-CM

## 2018-04-09 LAB
ANION GAP SERPL CALC-SCNC: 11 MMOL/L — SIGNIFICANT CHANGE UP (ref 5–17)
BUN SERPL-MCNC: 26 MG/DL — HIGH (ref 7–23)
CALCIUM SERPL-MCNC: 9.8 MG/DL — SIGNIFICANT CHANGE UP (ref 8.4–10.5)
CHLORIDE SERPL-SCNC: 80 MMOL/L — LOW (ref 96–108)
CO2 SERPL-SCNC: 37 MMOL/L — HIGH (ref 22–31)
CREAT SERPL-MCNC: 0.99 MG/DL — SIGNIFICANT CHANGE UP (ref 0.5–1.3)
GLUCOSE SERPL-MCNC: 86 MG/DL — SIGNIFICANT CHANGE UP (ref 70–99)
HCT VFR BLD CALC: 31.2 % — LOW (ref 34.5–45)
HGB BLD-MCNC: 9.9 G/DL — LOW (ref 11.5–15.5)
MCHC RBC-ENTMCNC: 27.3 PG — SIGNIFICANT CHANGE UP (ref 27–34)
MCHC RBC-ENTMCNC: 31.7 GM/DL — LOW (ref 32–36)
MCV RBC AUTO: 86 FL — SIGNIFICANT CHANGE UP (ref 80–100)
NT-PROBNP SERPL-SCNC: 2034 PG/ML — HIGH (ref 0–300)
OSMOLALITY UR: 250 MOS/KG — SIGNIFICANT CHANGE UP (ref 50–1200)
PLATELET # BLD AUTO: 99 K/UL — LOW (ref 150–400)
POTASSIUM SERPL-MCNC: 3.6 MMOL/L — SIGNIFICANT CHANGE UP (ref 3.5–5.3)
POTASSIUM SERPL-SCNC: 3.6 MMOL/L — SIGNIFICANT CHANGE UP (ref 3.5–5.3)
RBC # BLD: 3.63 M/UL — LOW (ref 3.8–5.2)
RBC # FLD: 14.9 % — HIGH (ref 10.3–14.5)
SODIUM SERPL-SCNC: 128 MMOL/L — LOW (ref 135–145)
TSH SERPL-MCNC: 1.84 UIU/ML — SIGNIFICANT CHANGE UP (ref 0.27–4.2)
WBC # BLD: 4.72 K/UL — SIGNIFICANT CHANGE UP (ref 3.8–10.5)
WBC # FLD AUTO: 4.72 K/UL — SIGNIFICANT CHANGE UP (ref 3.8–10.5)

## 2018-04-09 PROCEDURE — 99233 SBSQ HOSP IP/OBS HIGH 50: CPT

## 2018-04-09 RX ORDER — POTASSIUM CHLORIDE 20 MEQ
40 PACKET (EA) ORAL EVERY 4 HOURS
Qty: 0 | Refills: 0 | Status: COMPLETED | OUTPATIENT
Start: 2018-04-09 | End: 2018-04-09

## 2018-04-09 RX ORDER — PETROLATUM,WHITE
1 JELLY (GRAM) TOPICAL
Qty: 0 | Refills: 0 | Status: DISCONTINUED | OUTPATIENT
Start: 2018-04-09 | End: 2018-04-12

## 2018-04-09 RX ADMIN — CARVEDILOL PHOSPHATE 12.5 MILLIGRAM(S): 80 CAPSULE, EXTENDED RELEASE ORAL at 11:58

## 2018-04-09 RX ADMIN — RANOLAZINE 500 MILLIGRAM(S): 500 TABLET, FILM COATED, EXTENDED RELEASE ORAL at 21:00

## 2018-04-09 RX ADMIN — CARVEDILOL PHOSPHATE 12.5 MILLIGRAM(S): 80 CAPSULE, EXTENDED RELEASE ORAL at 21:00

## 2018-04-09 RX ADMIN — Medication 40 MILLIEQUIVALENT(S): at 11:58

## 2018-04-09 RX ADMIN — Medication 40 MILLIEQUIVALENT(S): at 16:56

## 2018-04-09 RX ADMIN — RANOLAZINE 500 MILLIGRAM(S): 500 TABLET, FILM COATED, EXTENDED RELEASE ORAL at 11:59

## 2018-04-09 RX ADMIN — APIXABAN 2.5 MILLIGRAM(S): 2.5 TABLET, FILM COATED ORAL at 05:39

## 2018-04-09 RX ADMIN — APIXABAN 2.5 MILLIGRAM(S): 2.5 TABLET, FILM COATED ORAL at 17:00

## 2018-04-09 RX ADMIN — Medication 40 MILLIGRAM(S): at 16:55

## 2018-04-09 RX ADMIN — SPIRONOLACTONE 25 MILLIGRAM(S): 25 TABLET, FILM COATED ORAL at 05:40

## 2018-04-09 RX ADMIN — Medication 40 MILLIGRAM(S): at 05:40

## 2018-04-09 RX ADMIN — LOSARTAN POTASSIUM 100 MILLIGRAM(S): 100 TABLET, FILM COATED ORAL at 05:39

## 2018-04-09 RX ADMIN — Medication 100 MILLIGRAM(S): at 05:39

## 2018-04-09 RX ADMIN — Medication 1 APPLICATION(S): at 21:00

## 2018-04-09 NOTE — PROGRESS NOTE ADULT - PROBLEM SELECTOR PLAN 3
- likely multifactorial from respiratory failure Co2 retention, and acute on chronic CHF exacerbation, deconditioning   no focal neurologic deficits, CT Head unremarkable, ECG w/ rate controlled Afib  - c/w Oxygen and NIV as needed, ABGS with co2 monitoring if patient symptomatic  - physical therapy

## 2018-04-09 NOTE — PROGRESS NOTE ADULT - SUBJECTIVE AND OBJECTIVE BOX
[facility] NEPHROLOGY- PROGRESS NOTE    77y female with history of CHF presents with weakness. Nephrology consulted for hyponatremia and volume overload.    Patient had been consulted on 4/3 for abnormal blood gas results. Nephrology now reconsulted due to hyponatremia and persistent volume overload.    Patient currently on lasix 40 mg IV twice daily, metolazone 2.5 mg daily and aldactone 25 mg daily.     REVIEW OF SYSTEMS:  Gen: no changes in weight  Cards: no chest pain  Resp: no dyspnea  GI: no nausea or vomiting or diarrhea  Vascular: no LE edema    latex (Unknown)  No Known Drug Allergies      Hospital Medications: Medications reviewed    VITALS:  T(F): 97.7 (04-09-18 @ 04:53), Max: 97.9 (04-08-18 @ 20:58)  HR: 70 (04-09-18 @ 09:41)  BP: 131/54 (04-09-18 @ 04:53)  RR: 22 (04-09-18 @ 04:53)  SpO2: 98% (04-09-18 @ 09:41)  Wt(kg): --  Height (cm): 152.4 (03-31 @ 16:47)  Weight (kg): 92.3 (04-04 @ 09:33)  BMI (kg/m2): 39.7 (04-04 @ 09:33)  BSA (m2): 1.88 (04-04 @ 09:33)    04-08 @ 07:01  -  04-09 @ 07:00  --------------------------------------------------------  IN: 1034 mL / OUT: 3620 mL / NET: -2586 mL    04-09 @ 07:01  -  04-09 @ 14:21  --------------------------------------------------------  IN: 240 mL / OUT: 650 mL / NET: -410 mL        PHYSICAL EXAM:    Gen: NAD, calm  Cards: Irregularly irregular, +S1/S2, no M/G/R  Resp: Decreased BS R base  GI: soft, NT/ND, NABS  Vascular: 2+ RLE edema > LLE edema    LABS:  04-09    128<L>  |  80<L>  |  26<H>  ----------------------------<  86  3.6   |  37<H>  |  0.99    Ca    9.8      09 Apr 2018 06:40      Creatinine Trend: 0.99 <--, 0.81 <--, 0.78 <--, 0.85 <--, 0.71 <--, 0.72 <--, 0.72 <--                        9.9    4.72  )-----------( 99       ( 09 Apr 2018 07:44 )             31.2     Urine Studies:    Osmolality, Random Urine: 250 mos/kg (04-08 @ 21:05)  Creatinine, Random Urine: 9 mg/dL (04-08 @ 18:10)  Chloride, Random Urine: 86 mmol/L (04-08 @ 18:10)  Sodium, Random Urine: 105 mmol/L (04-08 @ 18:10)  Potassium, Random Urine: 11 mmol/L (04-08 @ 18:10) [facility] NEPHROLOGY- PROGRESS NOTE    77y female with history of CHF presents with weakness. Nephrology consulted for hyponatremia and volume overload.    Patient had been consulted on 4/3 for abnormal blood gas results. Nephrology now reconsulted due to hyponatremia and persistent volume overload.    Patient currently on lasix 40 mg IV twice daily, metolazone 2.5 mg daily and aldactone 25 mg daily.     REVIEW OF SYSTEMS:  Gen: no changes in weight  Cards: no chest pain  Resp: + dyspnea improving  GI: no nausea or vomiting or diarrhea  Vascular: + LE edema    latex (Unknown)  No Known Drug Allergies      Hospital Medications: Medications reviewed    VITALS:  T(F): 97.7 (04-09-18 @ 04:53), Max: 97.9 (04-08-18 @ 20:58)  HR: 70 (04-09-18 @ 09:41)  BP: 131/54 (04-09-18 @ 04:53)  RR: 22 (04-09-18 @ 04:53)  SpO2: 98% (04-09-18 @ 09:41)  Wt(kg): --  Height (cm): 152.4 (03-31 @ 16:47)  Weight (kg): 92.3 (04-04 @ 09:33)  BMI (kg/m2): 39.7 (04-04 @ 09:33)  BSA (m2): 1.88 (04-04 @ 09:33)    04-08 @ 07:01  -  04-09 @ 07:00  --------------------------------------------------------  IN: 1034 mL / OUT: 3620 mL / NET: -2586 mL    04-09 @ 07:01  -  04-09 @ 14:21  --------------------------------------------------------  IN: 240 mL / OUT: 650 mL / NET: -410 mL        PHYSICAL EXAM:    Gen: NAD, calm  Cards: Irregularly irregular, +S1/S2, no M/G/R  Resp: Decreased BS R base  GI: soft, NT/ND, NABS  Vascular: 2+ RLE edema > LLE edema    LABS:  04-09    128<L>  |  80<L>  |  26<H>  ----------------------------<  86  3.6   |  37<H>  |  0.99    Ca    9.8      09 Apr 2018 06:40      Creatinine Trend: 0.99 <--, 0.81 <--, 0.78 <--, 0.85 <--, 0.71 <--, 0.72 <--, 0.72 <--                        9.9    4.72  )-----------( 99       ( 09 Apr 2018 07:44 )             31.2     Urine Studies:    Osmolality, Random Urine: 250 mos/kg (04-08 @ 21:05)  Creatinine, Random Urine: 9 mg/dL (04-08 @ 18:10)  Chloride, Random Urine: 86 mmol/L (04-08 @ 18:10)  Sodium, Random Urine: 105 mmol/L (04-08 @ 18:10)  Potassium, Random Urine: 11 mmol/L (04-08 @ 18:10)

## 2018-04-09 NOTE — PROGRESS NOTE ADULT - PROBLEM SELECTOR PLAN 4
Patient with post-hypercapneic metabolic alkalosis as per most recent blood gas after pCO2 lowered (presumably with BIPAP). Check AM VBG or ABG to monitor pH.

## 2018-04-09 NOTE — PROGRESS NOTE ADULT - SUBJECTIVE AND OBJECTIVE BOX
Patient is a 77y old  Female who presents with a chief complaint of generalized weakness (31 Mar 2018 15:30)      SUBJECTIVE / OVERNIGHT EVENTS: Pt says she feels better, less shortness of breath.     MEDICATIONS  (STANDING):  apixaban 2.5 milliGRAM(s) Oral every 12 hours  carvedilol 12.5 milliGRAM(s) Oral every 12 hours  docusate sodium 100 milliGRAM(s) Oral three times a day  furosemide   Injectable 40 milliGRAM(s) IV Push <User Schedule>  losartan 100 milliGRAM(s) Oral daily  metolazone 2.5 milliGRAM(s) Oral daily  polyethylene glycol 3350 17 Gram(s) Oral two times a day  potassium chloride    Tablet ER 40 milliEquivalent(s) Oral every 4 hours  povidone iodine 10% Swab 1 Application(s) Topical daily  ranolazine 500 milliGRAM(s) Oral two times a day  senna 2 Tablet(s) Oral at bedtime  sodium chloride 0.9% for Nebulization 3 milliLiter(s) Nebulizer four times a day  spironolactone 25 milliGRAM(s) Oral daily    MEDICATIONS  (PRN):      Vital Signs Last 24 Hrs  T(C): 36.5 (09 Apr 2018 04:53), Max: 36.7 (08 Apr 2018 12:27)  T(F): 97.7 (09 Apr 2018 04:53), Max: 98 (08 Apr 2018 12:27)  HR: 70 (09 Apr 2018 09:41) (52 - 80)  BP: 131/54 (09 Apr 2018 04:53) (126/73 - 161/63)  BP(mean): --  RR: 22 (09 Apr 2018 04:53) (18 - 22)  SpO2: 98% (09 Apr 2018 09:41) (98% - 100%)  CAPILLARY BLOOD GLUCOSE        I&O's Summary    08 Apr 2018 07:01  -  09 Apr 2018 07:00  --------------------------------------------------------  IN: 1034 mL / OUT: 3620 mL / NET: -2586 mL    09 Apr 2018 07:01  -  09 Apr 2018 11:27  --------------------------------------------------------  IN: 240 mL / OUT: 650 mL / NET: -410 mL        PHYSICAL EXAM:  GENERAL: NAD, well-developed  HEAD:  Atraumatic, Normocephalic  EYES: EOMI, PERRLA, conjunctiva and sclera clear  NECK: Supple, No JVD  CHEST/LUNG: Decreased breath sounds at bases; No wheeze  HEART: Regular rate and rhythm; No murmurs, rubs, or gallops  ABDOMEN: Soft, Nontender, Nondistended; Bowel sounds present  EXTREMITIES:  2+ Peripheral Pulses, No clubbing, cyanosis, +LE edema  PSYCH: AAOx3  NEUROLOGY: non-focal  SKIN: No rashes     LABS:                        9.9    4.72  )-----------( 99       ( 09 Apr 2018 07:44 )             31.2     04-09    128<L>  |  80<L>  |  26<H>  ----------------------------<  86  3.6   |  37<H>  |  0.99    Ca    9.8      09 Apr 2018 06:40                RADIOLOGY & ADDITIONAL TESTS:    Imaging Personally Reviewed:  Tele reviewed: /Otoniel     Consultant(s) Notes Reviewed:      Care Discussed with Consultants/Other Providers:

## 2018-04-09 NOTE — PROGRESS NOTE ADULT - PROBLEM SELECTOR PLAN 1
Multifactorial due to volume overload with acute worsening in sodium after metolazone started on 4/7. Given mild hyponatremia, would not increase metolazone at this time as UO adequate over the past 24 hours (3.6L and net 2.5L negative). If serum sodium decreases further, will discontinue metolazone and give tolvaptan for hypervolemic hyponatremia. Continue with 1L FR. Monitor serum sodium.

## 2018-04-09 NOTE — PROGRESS NOTE ADULT - PROBLEM SELECTOR PLAN 2
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 Arciniega  - Should be on BIPAP at night   - pulm f/u

## 2018-04-09 NOTE — PROGRESS NOTE ADULT - PROBLEM SELECTOR PLAN 1
Improving   -c/w Lasix 40mg BID IV, Aldactone 25mg daily, Metolazone 2.5 daily   -continue Losartan 100mg daily and Carvedilol 12.5mg BID  -monitor on tele   -closely monitor fluid status (strict Is/Os, daily weights), renal function, and electrolytes

## 2018-04-09 NOTE — PROGRESS NOTE ADULT - SUBJECTIVE AND OBJECTIVE BOX
Patient is a 77y old  Female who presents with a chief complaint of generalized weakness (31 Mar 2018 15:30)      INTERVAL HISTORY: feels ok  TELEMETRY: no events  	  MEDICATIONS:  carvedilol 12.5 milliGRAM(s) Oral every 12 hours  furosemide   Injectable 40 milliGRAM(s) IV Push <User Schedule>  losartan 100 milliGRAM(s) Oral daily  ranolazine 500 milliGRAM(s) Oral two times a day  spironolactone 25 milliGRAM(s) Oral daily        PHYSICAL EXAM:  T(C): 36.7 (04-09-18 @ 20:33), Max: 36.7 (04-09-18 @ 14:42)  HR: 55 (04-09-18 @ 20:33) (54 - 80)  BP: 127/70 (04-09-18 @ 20:33) (115/70 - 131/54)  RR: 18 (04-09-18 @ 20:33) (18 - 22)  SpO2: 99% (04-09-18 @ 20:33) (97% - 99%)  Wt(kg): --  I&O's Summary    08 Apr 2018 07:01  -  09 Apr 2018 07:00  --------------------------------------------------------  IN: 1034 mL / OUT: 3620 mL / NET: -2586 mL    09 Apr 2018 07:01  -  09 Apr 2018 22:09  --------------------------------------------------------  IN: 810 mL / OUT: 2300 mL / NET: -1490 mL          Appearance: Normal	  HEENT:    PERRL, EOMI	  Cardiovascular: Normal S1 S2, No JVD  Respiratory: Lungs clear to auscultation	  Psychiatry: Alert  Gastrointestinal:  Soft, Non-tender, + BS	  Skin: No rashes, No cyanosis  Extremities:  + edema of LE                                9.9    4.72  )-----------( 99       ( 09 Apr 2018 07:44 )             31.2     04-09    128<L>  |  80<L>  |  26<H>  ----------------------------<  86  3.6   |  37<H>  |  0.99    Ca    9.8      09 Apr 2018 06:40          Labs, imaging and telemetry personally reviewed    Assessment and Plan:   · Assessment		  77yoF w/ PMHx significant for HFrEF (LVEF = 19%) s/p AICD, CAD s/p stents w/ stable angina (pt on beta blocker and Ranolazine), Afib (on reduced dose Eliquis 2/2 "propensity for bleeding" as per family), interstitial fibrosis d/t Amiodarone toxicity, on home O2 (2.5L via NC; and intermittent NIV, unclear whether CPAP vs. BiPAP, though pt noncompliant), AAA s/p repair, who presents from home where she lives with her , w/ complaints of generalized weakness. She is being admitted for her complaints, along w/ acute on chronic respiratory failure / respiratory acidosis, and hyponatremia.      Problem/Plan - 1:  ·  Problem: Chronic systolic heart failure.  Plan: -   - c/w Lasix 40mg IV BID, inadequate response to diuresis overnight   - d/w renal, possible Tolvaptan of hypoNa persists                                     - closely monitor fluid status (strict Is/Os, daily weights, PEx), renal function, and electrolytes  - So far pt has lost 15 pounds since admission   - continue home Losartan 100mg daily, and Carvedilol 12.5mg BID.   - added aldactone to facilitate diuresis - monitor BMP daily   -TTE reviewed with Biventricular HF and severe Pulm HTN consistent with her 20+ year history of HF       Problem/Plan - 2:  ·  Problem: Acute on chronic respiratory failure with hypoxia and hypercapnia.  Plan: - on home O2 - 2.5L -  - pt chronic CO2 retainer with bicarb of 35 as outpt one month ago  - pt appears grossly volume overloaded  - c/w diuresis with Lasix 40mg IV BID, Metolazone to 2.5mg daily   - d/w renal, possible Tolvaptan of hypoNa persists     Problem/Plan - 3:  ·  Problem: Hyponatremia.  Plan: likely Hypervolemic hyponatremia  C/w Diuresis and trend Na.   - renal consult appreciated       Problem/Plan - 4:  ·  Problem: Coronary artery disease of native artery of native heart with stable angina pectoris.  Plan: - continue home ARB, beta blocker as above, and Ranexa 500mg BID       Problem/Plan - 5:  ·  Problem: Atrial fibrillation.  Plan: - rate controlled  - will continue anticoagulation w/ Eliquis 2.5mg BID (home dose)  - continue beta blocker as above.   - ICD interrogated with no events   	          Gamaliel Russell DO [facility]  Cardiovascular Medicine  [phone]

## 2018-04-09 NOTE — PROGRESS NOTE ADULT - PROBLEM SELECTOR PLAN 5
- Paroxysmal Afib, rate controlled  - will continue anticoagulation w/ Eliquis 2.5mg BID (as per pt, she tends to bleed, and was then placed on reduced dosing, despite no restrictions based on age/weight/renal function)  - continue beta blocker as above

## 2018-04-09 NOTE — PROGRESS NOTE ADULT - ASSESSMENT
77 year old female with history of CHF presents with weakness. Nephrology consulted for hyponatremia and volume overload.

## 2018-04-09 NOTE — PROGRESS NOTE ADULT - SUBJECTIVE AND OBJECTIVE BOX
PULOMARY PROGRESS NOTE    CHF    hypercapnic respiratory failure    now on furosemide, metolazone, spironolactone    compensatory alkalosis persist bicarb 35    No fever  no chest pain    lungs equal diminshed aeration  heart RR paced  abd nt protuberant    extr chronic edema with skin changes  alert   walks in room    IMPR    decompensated CHF    low EF    afib  interstitial lung disease    agree with diuresis  monitor alkalosis    BIPAP at night O2 in day  MD DR. Obi Choi Dr    [facility] Pulmonary    [facility]    [phone] PULMONARY PROGRESS NOTE    CHF    hypercapnic respiratory failure    now on furosemide, metolazone, spironolactone    compensatory alkalosis persist bicarb 35    No fever  no chest pain    lungs equal diminshed aeration  heart RR paced  abd nt protuberant    extr chronic edema with skin changes  alert   walks in room    IMPR    decompensated CHF    low EF    afib  interstitial lung disease    agree with diuresis  monitor alkalosis    BIPAP at night O2 in day    hyponatremia noted, likely salt and water loss  from diuretic coupled with salt restricted diet  Agree with fluid restriction    would check Urine SG and Urine Na and OSm  Could liberalize Salt intake a bit    MD DR. Obi Choi Dr    [facility] Pulmonary    [facility]    [phone]

## 2018-04-09 NOTE — PROGRESS NOTE ADULT - PROBLEM SELECTOR PLAN 4
Hypervolemic hyponatremia and possibly related to diuretic now.   - C/w Diuresis.  - Fluid restriction 800ml/day

## 2018-04-10 DIAGNOSIS — N17.9 ACUTE KIDNEY FAILURE, UNSPECIFIED: ICD-10-CM

## 2018-04-10 LAB
ANION GAP SERPL CALC-SCNC: 11 MMOL/L — SIGNIFICANT CHANGE UP (ref 5–17)
BUN SERPL-MCNC: 32 MG/DL — HIGH (ref 7–23)
CALCIUM SERPL-MCNC: 9.9 MG/DL — SIGNIFICANT CHANGE UP (ref 8.4–10.5)
CHLORIDE SERPL-SCNC: 79 MMOL/L — LOW (ref 96–108)
CO2 SERPL-SCNC: 41 MMOL/L — HIGH (ref 22–31)
CREAT SERPL-MCNC: 1.11 MG/DL — SIGNIFICANT CHANGE UP (ref 0.5–1.3)
GLUCOSE SERPL-MCNC: 89 MG/DL — SIGNIFICANT CHANGE UP (ref 70–99)
HCT VFR BLD CALC: 29.6 % — LOW (ref 34.5–45)
HGB BLD-MCNC: 9.7 G/DL — LOW (ref 11.5–15.5)
MAGNESIUM SERPL-MCNC: 1.9 MG/DL — SIGNIFICANT CHANGE UP (ref 1.6–2.6)
MCHC RBC-ENTMCNC: 27.3 PG — SIGNIFICANT CHANGE UP (ref 27–34)
MCHC RBC-ENTMCNC: 32.8 GM/DL — SIGNIFICANT CHANGE UP (ref 32–36)
MCV RBC AUTO: 83.4 FL — SIGNIFICANT CHANGE UP (ref 80–100)
OSMOLALITY SERPL: 271 MOS/KG — LOW (ref 275–300)
PLATELET # BLD AUTO: 105 K/UL — LOW (ref 150–400)
POTASSIUM SERPL-MCNC: 4.1 MMOL/L — SIGNIFICANT CHANGE UP (ref 3.5–5.3)
POTASSIUM SERPL-SCNC: 4.1 MMOL/L — SIGNIFICANT CHANGE UP (ref 3.5–5.3)
RBC # BLD: 3.55 M/UL — LOW (ref 3.8–5.2)
RBC # FLD: 15.2 % — HIGH (ref 10.3–14.5)
SODIUM SERPL-SCNC: 131 MMOL/L — LOW (ref 135–145)
WBC # BLD: 4.44 K/UL — SIGNIFICANT CHANGE UP (ref 3.8–10.5)
WBC # FLD AUTO: 4.44 K/UL — SIGNIFICANT CHANGE UP (ref 3.8–10.5)

## 2018-04-10 PROCEDURE — 99233 SBSQ HOSP IP/OBS HIGH 50: CPT

## 2018-04-10 RX ORDER — FUROSEMIDE 40 MG
80 TABLET ORAL
Qty: 0 | Refills: 0 | Status: DISCONTINUED | OUTPATIENT
Start: 2018-04-11 | End: 2018-04-11

## 2018-04-10 RX ORDER — MAGNESIUM SULFATE 500 MG/ML
1 VIAL (ML) INJECTION ONCE
Qty: 0 | Refills: 0 | Status: COMPLETED | OUTPATIENT
Start: 2018-04-10 | End: 2018-04-10

## 2018-04-10 RX ADMIN — Medication 40 MILLIGRAM(S): at 05:45

## 2018-04-10 RX ADMIN — RANOLAZINE 500 MILLIGRAM(S): 500 TABLET, FILM COATED, EXTENDED RELEASE ORAL at 21:40

## 2018-04-10 RX ADMIN — Medication 40 MILLIGRAM(S): at 15:42

## 2018-04-10 RX ADMIN — SPIRONOLACTONE 25 MILLIGRAM(S): 25 TABLET, FILM COATED ORAL at 05:45

## 2018-04-10 RX ADMIN — APIXABAN 2.5 MILLIGRAM(S): 2.5 TABLET, FILM COATED ORAL at 17:50

## 2018-04-10 RX ADMIN — RANOLAZINE 500 MILLIGRAM(S): 500 TABLET, FILM COATED, EXTENDED RELEASE ORAL at 09:13

## 2018-04-10 RX ADMIN — LOSARTAN POTASSIUM 100 MILLIGRAM(S): 100 TABLET, FILM COATED ORAL at 05:45

## 2018-04-10 RX ADMIN — APIXABAN 2.5 MILLIGRAM(S): 2.5 TABLET, FILM COATED ORAL at 05:45

## 2018-04-10 RX ADMIN — CARVEDILOL PHOSPHATE 12.5 MILLIGRAM(S): 80 CAPSULE, EXTENDED RELEASE ORAL at 09:13

## 2018-04-10 RX ADMIN — Medication 100 GRAM(S): at 11:35

## 2018-04-10 NOTE — PROGRESS NOTE ADULT - PROBLEM SELECTOR PLAN 4
Patient with post-hypercapneic metabolic alkalosis as per most recent blood gas after pCO2 lowered (presumably with BIPAP). Serum C02 rising presumably due to metolazone. Check VBG or ABG to monitor pH.

## 2018-04-10 NOTE — DIETITIAN INITIAL EVALUATION ADULT. - OTHER INFO
Patient seen for LOS. Patient reports good PO intake in-house, completing % of meals per chart. Writer observed lunch tray with >75% of meal consumed. Patient previously on DASH diet however diet was liberalized by MD team due to hyponatremia. Patient on 800ml fluid restriction, RD briefly reviewed with patient total daily fluid allowance, patient aware and understanding. Patient confirms no NKFA and denies taking and vitamin/mineral supplementation. Reports daily bowel movements however will occasionally take Colace at home. Denies any GI distress. Patient reports UBW as 85.7 Kg in December 2017 however has noticed her weight increasing. Pt believes this is due to fluid retention as well as some true weight gain due to the holidays. Wts in-house currently downtrending as pt being diuresed. (4/3) 93.4Kg -->(4/6) 92.1 kg --> (4/7) 89.9kg --> (4/9) 85.7kg --> (4/10) 84.4kg

## 2018-04-10 NOTE — PROGRESS NOTE ADULT - SUBJECTIVE AND OBJECTIVE BOX
Patient is a 77y old  Female who presents with a chief complaint of generalized weakness (31 Mar 2018 15:30)      INTERVAL HISTORY: feels well    TELEMETRY: no events  	  MEDICATIONS:  carvedilol 12.5 milliGRAM(s) Oral every 12 hours  losartan 100 milliGRAM(s) Oral daily  ranolazine 500 milliGRAM(s) Oral two times a day  spironolactone 25 milliGRAM(s) Oral daily        PHYSICAL EXAM:  T(C): 36.9 (04-10-18 @ 21:14), Max: 36.9 (04-10-18 @ 21:14)  HR: 55 (04-10-18 @ 21:14) (50 - 77)  BP: 116/68 (04-10-18 @ 21:14) (99/62 - 142/60)  RR: 18 (04-10-18 @ 21:14) (18 - 22)  SpO2: 98% (04-10-18 @ 21:14) (97% - 100%)  Wt(kg): --  I&O's Summary    09 Apr 2018 07:01  -  10 Apr 2018 07:00  --------------------------------------------------------  IN: 810 mL / OUT: 3200 mL / NET: -2390 mL    10 Apr 2018 07:01  -  10 Apr 2018 22:00  --------------------------------------------------------  IN: 620 mL / OUT: 1550 mL / NET: -930 mL          Appearance: Normal	  HEENT:    PERRL, EOMI	  Cardiovascular: Normal S1 S2, No JVD  Respiratory: Lungs clear to auscultation	  Psychiatry: Alert  Gastrointestinal:  Soft, Non-tender, + BS	  Skin: No rashes, No cyanosis  Extremities:  No edema of LE                                9.7    4.44  )-----------( 105      ( 10 Apr 2018 07:39 )             29.6     04-10    131<L>  |  79<L>  |  32<H>  ----------------------------<  89  4.1   |  41<H>  |  1.11    Ca    9.9      10 Apr 2018 06:29  Mg     1.9     04-10          Labs, imaging and telemetry personally reviewed      ASSESSMENT/PLAN: 	  Assessment and Plan:   · Assessment		  77yoF w/ PMHx significant for HFrEF (LVEF = 19%) s/p AICD, CAD s/p stents w/ stable angina (pt on beta blocker and Ranolazine), Afib (on reduced dose Eliquis 2/2 "propensity for bleeding" as per family), interstitial fibrosis d/t Amiodarone toxicity, on home O2 (2.5L via NC; and intermittent NIV, unclear whether CPAP vs. BiPAP, though pt noncompliant), AAA s/p repair, who presents from home where she lives with her , w/ complaints of generalized weakness. She is being admitted for her complaints, along w/ acute on chronic respiratory failure / respiratory acidosis, and hyponatremia.      Problem/Plan - 1:  ·  Problem: Chronic systolic heart failure.  Plan: -   - c/w Lasix 40mg IV BID, switch to 80mg PO BID in am  - d/w renal, possible Tolvaptan of hypoNa persists, but improving for now                                     - closely monitor fluid status (strict Is/Os, daily weights, PEx), renal function, and electrolytes  - So far pt has lost >15 pounds since admission   - continue home Losartan 100mg daily, and Carvedilol 12.5mg BID.   - added aldactone to facilitate diuresis - monitor BMP daily   -TTE reviewed with Biventricular HF and severe Pulm HTN consistent with her 20+ year history of HF       Problem/Plan - 2:  ·  Problem: Acute on chronic respiratory failure with hypoxia and hypercapnia.  Plan: - on home O2 - 2.5L -  - pt chronic CO2 retainer with bicarb of 35 as outpt one month ago  - pt appears grossly volume overloaded  -  switch Lasix to 80mg PO BID in am  - d/w renal, possible Tolvaptan of hypoNa persists     Problem/Plan - 3:  ·  Problem: Hyponatremia.  Plan: likely Hypervolemic hyponatremia  C/w Diuresis and trend Na.   - renal consult appreciated       Problem/Plan - 4:  ·  Problem: Coronary artery disease of native artery of native heart with stable angina pectoris.  Plan: - continue home ARB, beta blocker as above, and Ranexa 500mg BID       Problem/Plan - 5:  ·  Problem: Atrial fibrillation.  Plan: - rate controlled  - will continue anticoagulation w/ Eliquis 2.5mg BID (home dose)  - continue beta blocker as above.   - ICD interrogated with no events       Plan for discharge Thursday-Friday is responds well to oral diuretics        Gamaliel Russell DO [facility]  Cardiovascular Medicine  [phone]

## 2018-04-10 NOTE — DIETITIAN INITIAL EVALUATION ADULT. - ORAL INTAKE PTA
fair/Patient reports overall good PO intake PTA, consuming 3 meals per day, consisiting of a variety of foods. Recently however do to social issues ( in hospital) patient reports she has been more tried and does not have as much of an appetite, consuming ~ 50-75% of meals and sometimes skipping lunch. Diet recall: Breakfast: omelete ( 2 eggs) or oatmeal w/ coffee/ milk, (skips lunch-still feels full during this time) D: chicken or meatballs, rice and lots of vegetables (likes string beans, artichokes). , fair/Patient reports overall good PO intake PTA, consuming 3 meals per day, consisiting of a variety of foods. Recently however do to social issues ( in hospital) patient reports she has been more tried and does not have as much of an appetite, consuming ~ 50-75% of meals and sometimes skips lunch. Diet recall: Breakfast: omelete ( 2 eggs) or oatmeal w/ coffee/ milk, (skips lunch-still feels full during this time) D: chicken or meatballs, rice and lots of vegetables (likes string beans, artichokes). ,

## 2018-04-10 NOTE — PROGRESS NOTE ADULT - PROBLEM SELECTOR PLAN 2
With excellent UO on current regimen. Given rising Scr, would discontinue metolazone to prevent further KARAN. Monitor UO.

## 2018-04-10 NOTE — PROGRESS NOTE ADULT - PROBLEM SELECTOR PLAN 1
Improving   -c/w Lasix 40mg BID IV, consider transitioning to PO Lasix   -hold Metolazone d/t KARAN and hyponatremia    -Aldactone 25mg daily   -continue Losartan 100mg daily and Carvedilol 12.5mg BID  -monitor on tele   -closely monitor fluid status (strict Is/Os, daily weights), renal function, and electrolytes

## 2018-04-10 NOTE — PROGRESS NOTE ADULT - ASSESSMENT
Impression :   CHF, improved  Mild ILD stable  Chronic hypercapnia      Recommend :   Continue CHF treatment as per Cardiology  Maintain on oxygen via nasal canula during the day and BiPAP at night      Obi Arciniega MD, FCCP  [facility]  Pulmonary Medicine

## 2018-04-10 NOTE — PROGRESS NOTE ADULT - SUBJECTIVE AND OBJECTIVE BOX
[facility] NEPHROLOGY- PROGRESS NOTE    77y female with history of CHF presents with weakness. Nephrology consulted for hyponatremia and volume overload.    Patient currently on lasix 40 mg IV twice daily, metolazone 2.5 mg daily and aldactone 25 mg daily.     REVIEW OF SYSTEMS:  Gen: no changes in weight  Cards: no chest pain  Resp: + dyspnea improving  GI: no nausea or vomiting or diarrhea  Vascular: + LE edema    latex (Unknown)  No Known Drug Allergies      Hospital Medications: Medications reviewed      VITALS:  T(F): 97.6 (04-10-18 @ 04:53), Max: 98.1 (04-09-18 @ 14:42)  HR: 77 (04-10-18 @ 06:26)  BP: 142/60 (04-10-18 @ 04:53)  RR: 22 (04-10-18 @ 04:53)  SpO2: 98% (04-10-18 @ 06:26)  Wt(kg): --    04-09 @ 07:01  -  04-10 @ 07:00  --------------------------------------------------------  IN: 810 mL / OUT: 3200 mL / NET: -2390 mL    04-10 @ 07:01  -  04-10 @ 11:02  --------------------------------------------------------  IN: 120 mL / OUT: 0 mL / NET: 120 mL        Weight (kg): 85.7 (04-09 @ 19:13)        PHYSICAL EXAM:    Gen: NAD, calm  Cards: Irregularly irregular, +S1/S2, no M/G/R  Resp: Decreased BS R base  GI: soft, NT/ND, NABS  Vascular: 2+ RLE edema > LLE edema      LABS:  04-10    131<L>  |  79<L>  |  32<H>  ----------------------------<  89  4.1   |  41<H>  |  1.11    Ca    9.9      10 Apr 2018 06:29  Mg     1.9     04-10      Creatinine Trend: 1.11 <--, 0.99 <--, 0.81 <--, 0.78 <--, 0.85 <--, 0.71 <--, 0.72 <--                        9.7    4.44  )-----------( 105      ( 10 Apr 2018 07:39 )             29.6

## 2018-04-10 NOTE — PROGRESS NOTE ADULT - PROBLEM SELECTOR PLAN 2
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 Arciniega  - Should be on BIPAP at night   - pulm f/u

## 2018-04-10 NOTE — DIETITIAN INITIAL EVALUATION ADULT. - ENERGY NEEDS
Pertinent Information: 77yoF w/ PMHx significant for HFrEF (LVEF = 19%) s/p AICD, CAD s/p stents w/ stable angina, Afib, AAA s/p repair, presenting with acute on chronic respiratory failure due to pulmonary fibrosis and acute on chronic systolic heart failure, respiratory acidosis, and hyponatremia. Currently being diuresed.   Ht: 152.4, Wt: 185.6lbs, BMI: 36kg/m2, IBW: 100 lbs +/- 10%, %IBW: 186%  +2 Edema right and left leg , Skin: intact, free of pressure ulcers

## 2018-04-10 NOTE — PROGRESS NOTE ADULT - SUBJECTIVE AND OBJECTIVE BOX
.  Pulmonary Medicine       Feels better  - able to walk with walker      Vital Signs Last 24 Hrs  T(C): 36.4 (10 Apr 2018 04:53), Max: 36.7 (09 Apr 2018 14:42)  T(F): 97.6 (10 Apr 2018 04:53), Max: 98.1 (09 Apr 2018 14:42)  HR: 77 (10 Apr 2018 06:26) (54 - 77)  BP: 142/60 (10 Apr 2018 04:53) (115/70 - 142/60)  BP(mean): --  RR: 22 (10 Apr 2018 04:53) (18 - 22)  SpO2: 98% (10 Apr 2018 06:26) (97% - 99%)      Physical examination   Alert and oriented X three   No evident distress   Heart sounds were irregular   Few crackles present   The abdomen was soft and not tender  Dependent edema noted   No cyanosis                               9.7    4.44  )-----------( 105      ( 10 Apr 2018 07:39 )             29.6       04-10    131<L>  |  79<L>  |  32<H>  ----------------------------<  89  4.1   |  41<H>  |  1.11    Ca    9.9      10 Apr 2018 06:29  Mg     1.9     04-10        MEDICATIONS  (STANDING):  apixaban 2.5 milliGRAM(s) Oral every 12 hours  AQUAPHOR (petrolatum Ointment) 1 Application(s) Topical two times a day  carvedilol 12.5 milliGRAM(s) Oral every 12 hours  docusate sodium 100 milliGRAM(s) Oral three times a day  furosemide   Injectable 40 milliGRAM(s) IV Push <User Schedule>  losartan 100 milliGRAM(s) Oral daily  magnesium sulfate  IVPB 1 Gram(s) IV Intermittent once  metolazone 2.5 milliGRAM(s) Oral <User Schedule>  polyethylene glycol 3350 17 Gram(s) Oral two times a day  ranolazine 500 milliGRAM(s) Oral two times a day  senna 2 Tablet(s) Oral at bedtime  sodium chloride 0.9% for Nebulization 3 milliLiter(s) Nebulizer four times a day  spironolactone 25 milliGRAM(s) Oral daily

## 2018-04-10 NOTE — PROGRESS NOTE ADULT - PROBLEM SELECTOR PLAN 3
- d/t diuresis   - spoke with renal, hold Metolazone and consider transitioning to PO Lasix   - avoid nephrotoxic agents   - monitor Cr

## 2018-04-10 NOTE — DIETITIAN INITIAL EVALUATION ADULT. - NS AS NUTRI INTERV MEALS SNACK
Per patient request reviewed low sodium diet for at home, discussed avoiding using sodium when cooking, limiting high sodium foods and label reading., discussed fluid restriction while in hospital/General/healthful diet/Fluid - modified diet

## 2018-04-10 NOTE — DIETITIAN INITIAL EVALUATION ADULT. - ADHERENCE
good/Patient says she follows a low sodium diet at home, avoid cold cuts and will rise canned items with water. Will use a touch of salt when cooking but also like Henrique Cooper seasoning. Patient says she follows a low sodium diet at home, avoid cold cuts and rinses canned items with water. Will use a touch of salt when cooking but also likes Mrs. Cooper seasoning./good

## 2018-04-10 NOTE — PROGRESS NOTE ADULT - SUBJECTIVE AND OBJECTIVE BOX
Patient is a 77y old  Female who presents with a chief complaint of generalized weakness (31 Mar 2018 15:30)      SUBJECTIVE / OVERNIGHT EVENTS: Pt feels better.     MEDICATIONS  (STANDING):  apixaban 2.5 milliGRAM(s) Oral every 12 hours  AQUAPHOR (petrolatum Ointment) 1 Application(s) Topical two times a day  carvedilol 12.5 milliGRAM(s) Oral every 12 hours  docusate sodium 100 milliGRAM(s) Oral three times a day  furosemide   Injectable 40 milliGRAM(s) IV Push <User Schedule>  losartan 100 milliGRAM(s) Oral daily  magnesium sulfate  IVPB 1 Gram(s) IV Intermittent once  polyethylene glycol 3350 17 Gram(s) Oral two times a day  ranolazine 500 milliGRAM(s) Oral two times a day  senna 2 Tablet(s) Oral at bedtime  sodium chloride 0.9% for Nebulization 3 milliLiter(s) Nebulizer four times a day  spironolactone 25 milliGRAM(s) Oral daily    MEDICATIONS  (PRN):      Vital Signs Last 24 Hrs  T(C): 36.4 (10 Apr 2018 04:53), Max: 36.7 (09 Apr 2018 14:42)  T(F): 97.6 (10 Apr 2018 04:53), Max: 98.1 (09 Apr 2018 14:42)  HR: 77 (10 Apr 2018 06:26) (54 - 77)  BP: 142/60 (10 Apr 2018 04:53) (115/70 - 142/60)  BP(mean): --  RR: 22 (10 Apr 2018 04:53) (18 - 22)  SpO2: 98% (10 Apr 2018 06:26) (97% - 99%)  CAPILLARY BLOOD GLUCOSE        I&O's Summary    09 Apr 2018 07:01  -  10 Apr 2018 07:00  --------------------------------------------------------  IN: 810 mL / OUT: 3200 mL / NET: -2390 mL    10 Apr 2018 07:01  -  10 Apr 2018 10:52  --------------------------------------------------------  IN: 120 mL / OUT: 0 mL / NET: 120 mL        PHYSICAL EXAM:  GENERAL: NAD   HEAD:  Atraumatic, Normocephalic  EYES: EOMI, PERRLA, conjunctiva and sclera clear  NECK: Supple, No JVD  CHEST/LUNG: Crackles at bases; No wheeze  HEART: Irregular rhythm; No murmurs, rubs, or gallops  ABDOMEN: Soft, Nontender, Nondistended; Bowel sounds present  EXTREMITIES:  2+ Peripheral Pulses, No clubbing, cyanosis, +LE edema  PSYCH: AAOx3  NEUROLOGY: non-focal  SKIN: No rashes     LABS:                        9.7    4.44  )-----------( 105      ( 10 Apr 2018 07:39 )             29.6     04-10    131<L>  |  79<L>  |  32<H>  ----------------------------<  89  4.1   |  41<H>  |  1.11    Ca    9.9      10 Apr 2018 06:29  Mg     1.9     04-10                RADIOLOGY & ADDITIONAL TESTS:    Imaging Personally Reviewed:  Tele reviewed: Carlo/Otoniel 50-60s     Consultant(s) Notes Reviewed:      Care Discussed with Consultants/Other Providers:

## 2018-04-10 NOTE — PROGRESS NOTE ADULT - PROBLEM SELECTOR PLAN 4
Hypervolemic hyponatremia and possibly related to Metolazone use  - C/w diuresis   - Fluid restriction 800ml/day  - hold Metolazone   - monitor Na

## 2018-04-10 NOTE — DIETITIAN INITIAL EVALUATION ADULT. - PROBLEM SELECTOR PLAN 2
- on home O2 - 2.5L (as per pt, she notices feeling dizzy when her SpO2 = 100%)  - as per family, she is a chronic CO2 retainer, with her levels usually in the 50s  - as per family, she was instructed to use NIV (unclear if CPAP or BiPAP, pt never had a sleep study), prescribed by her pulmonologist, Dr. Arciniega, but she is very noncompliant as the machine exacerbates her dry mouth and she finds it intolerable  - will initiate BiPAP with serial ABG monitoring to assess for improvement of hypercarbia; will contact pulmonologist re: NIV settings, etc before initiation, and have them see the pt during hospitalization    ADDENDUM: Spoke with on-call pulmonologist, at [phone], the partner w/ Dr. 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 Patient and daughter in agreement with this plan as well.

## 2018-04-10 NOTE — DIETITIAN INITIAL EVALUATION ADULT. - PROBLEM SELECTOR PLAN 1
- unclear etiology, no focal neurologic deficits, CT Head unremarkable, ECG w/ rate controlled Afib  - will interrogate AICD by cardiology fellow  - ?deconditioning in the setting of recent LLE ulcer management and decreased activity level vs. d/t aberrations explained below  - pt states she is feeling better than she did when she arrived  - PT consult

## 2018-04-10 NOTE — DIETITIAN INITIAL EVALUATION ADULT. - PROBLEM SELECTOR PLAN 8
- continue home ARB, beta blocker as above, and Ranexa 500mg BID  - pt states she does not need to be on a statin despite hx of PCI w/ stents to RCA  - will obtain collateral from cardiologist, Dr. Vidal    ADDENDUM: Spoke with on-call cardiologist, Dr. Menjivar, at [phone], whose group (?Dr. Russell) will see the patient in the AM. She is in agreement with plan for AICD interrogation with determination for need for telemetry monitoring thereafter. Patient and her daughters are also in agreement with this plan. [place] [phone] made aware, as well as overnight NP.

## 2018-04-10 NOTE — DIETITIAN INITIAL EVALUATION ADULT. - PROBLEM SELECTOR PLAN 7
- pt does not appear to be in decompensated state, despite LE edema B/L  - will hold Lasix given signs/sx of hypovolemia as above but will closely monitor fluid status, renal function, and electrolytes  - continue home Losartan 100mg daily, and Carvedilol 12.5mg BID

## 2018-04-10 NOTE — DIETITIAN INITIAL EVALUATION ADULT. - NS FNS REASON FOR WEIGHT CHANG
fluid retention/Patient has noticed her weight increasing since Dec. due to fluid retention in addition to some true weight gain due to increased PO intake over the holidays. fluid retention

## 2018-04-11 DIAGNOSIS — N17.9 ACUTE KIDNEY FAILURE, UNSPECIFIED: ICD-10-CM

## 2018-04-11 LAB
ANION GAP SERPL CALC-SCNC: 9 MMOL/L — SIGNIFICANT CHANGE UP (ref 5–17)
BUN SERPL-MCNC: 41 MG/DL — HIGH (ref 7–23)
CALCIUM SERPL-MCNC: 10.1 MG/DL — SIGNIFICANT CHANGE UP (ref 8.4–10.5)
CHLORIDE SERPL-SCNC: 82 MMOL/L — LOW (ref 96–108)
CO2 SERPL-SCNC: 40 MMOL/L — HIGH (ref 22–31)
CREAT SERPL-MCNC: 1.31 MG/DL — HIGH (ref 0.5–1.3)
GLUCOSE SERPL-MCNC: 84 MG/DL — SIGNIFICANT CHANGE UP (ref 70–99)
HCT VFR BLD CALC: 32.4 % — LOW (ref 34.5–45)
HGB BLD-MCNC: 10 G/DL — LOW (ref 11.5–15.5)
MAGNESIUM SERPL-MCNC: 2.3 MG/DL — SIGNIFICANT CHANGE UP (ref 1.6–2.6)
MCHC RBC-ENTMCNC: 26.4 PG — LOW (ref 27–34)
MCHC RBC-ENTMCNC: 30.9 GM/DL — LOW (ref 32–36)
MCV RBC AUTO: 85.5 FL — SIGNIFICANT CHANGE UP (ref 80–100)
PHOSPHATE SERPL-MCNC: 4 MG/DL — SIGNIFICANT CHANGE UP (ref 2.5–4.5)
PLATELET # BLD AUTO: 113 K/UL — LOW (ref 150–400)
POTASSIUM SERPL-MCNC: 4 MMOL/L — SIGNIFICANT CHANGE UP (ref 3.5–5.3)
POTASSIUM SERPL-SCNC: 4 MMOL/L — SIGNIFICANT CHANGE UP (ref 3.5–5.3)
RBC # BLD: 3.79 M/UL — LOW (ref 3.8–5.2)
RBC # FLD: 15 % — HIGH (ref 10.3–14.5)
SODIUM SERPL-SCNC: 131 MMOL/L — LOW (ref 135–145)
WBC # BLD: 4.6 K/UL — SIGNIFICANT CHANGE UP (ref 3.8–10.5)
WBC # FLD AUTO: 4.6 K/UL — SIGNIFICANT CHANGE UP (ref 3.8–10.5)

## 2018-04-11 PROCEDURE — 99233 SBSQ HOSP IP/OBS HIGH 50: CPT

## 2018-04-11 RX ORDER — FUROSEMIDE 40 MG
60 TABLET ORAL
Qty: 0 | Refills: 0 | Status: DISCONTINUED | OUTPATIENT
Start: 2018-04-11 | End: 2018-04-11

## 2018-04-11 RX ADMIN — SPIRONOLACTONE 25 MILLIGRAM(S): 25 TABLET, FILM COATED ORAL at 05:38

## 2018-04-11 RX ADMIN — RANOLAZINE 500 MILLIGRAM(S): 500 TABLET, FILM COATED, EXTENDED RELEASE ORAL at 09:40

## 2018-04-11 RX ADMIN — CARVEDILOL PHOSPHATE 12.5 MILLIGRAM(S): 80 CAPSULE, EXTENDED RELEASE ORAL at 09:40

## 2018-04-11 RX ADMIN — Medication 1 APPLICATION(S): at 17:43

## 2018-04-11 RX ADMIN — Medication 1 APPLICATION(S): at 05:39

## 2018-04-11 RX ADMIN — Medication 80 MILLIGRAM(S): at 05:38

## 2018-04-11 RX ADMIN — APIXABAN 2.5 MILLIGRAM(S): 2.5 TABLET, FILM COATED ORAL at 17:43

## 2018-04-11 RX ADMIN — LOSARTAN POTASSIUM 100 MILLIGRAM(S): 100 TABLET, FILM COATED ORAL at 05:38

## 2018-04-11 RX ADMIN — APIXABAN 2.5 MILLIGRAM(S): 2.5 TABLET, FILM COATED ORAL at 05:38

## 2018-04-11 RX ADMIN — RANOLAZINE 500 MILLIGRAM(S): 500 TABLET, FILM COATED, EXTENDED RELEASE ORAL at 22:30

## 2018-04-11 NOTE — PROGRESS NOTE ADULT - PROBLEM SELECTOR PLAN 1
Patient with KARAN likely hemodynamically mediated secondary to decrease EABV in setting of diuretic and losartan use with relative hypotension. Would hold evening lasix today if ok with cardiology and consider decreasing losartan if BP remains low. Avoid nephrotoxins.

## 2018-04-11 NOTE — PROGRESS NOTE ADULT - SUBJECTIVE AND OBJECTIVE BOX
Patient is a 77y old  Female who presents with a chief complaint of generalized weakness (31 Mar 2018 15:30)      SUBJECTIVE / OVERNIGHT EVENTS: Pt felt dizzy and lethargic after receiving Lasix this AM.     MEDICATIONS  (STANDING):  apixaban 2.5 milliGRAM(s) Oral every 12 hours  AQUAPHOR (petrolatum Ointment) 1 Application(s) Topical two times a day  carvedilol 12.5 milliGRAM(s) Oral every 12 hours  furosemide    Tablet 80 milliGRAM(s) Oral <User Schedule>  losartan 100 milliGRAM(s) Oral daily  polyethylene glycol 3350 17 Gram(s) Oral two times a day  ranolazine 500 milliGRAM(s) Oral two times a day  senna 2 Tablet(s) Oral at bedtime  sodium chloride 0.9% for Nebulization 3 milliLiter(s) Nebulizer four times a day  spironolactone 25 milliGRAM(s) Oral daily    MEDICATIONS  (PRN):      Vital Signs Last 24 Hrs  T(C): 37 (11 Apr 2018 04:32), Max: 37 (11 Apr 2018 04:32)  T(F): 98.6 (11 Apr 2018 04:32), Max: 98.6 (11 Apr 2018 04:32)  HR: 65 (11 Apr 2018 09:43) (50 - 74)  BP: 93/40 (11 Apr 2018 09:43) (93/40 - 153/75)  BP(mean): --  RR: 18 (11 Apr 2018 06:39) (18 - 22)  SpO2: 99% (11 Apr 2018 06:39) (97% - 100%)  CAPILLARY BLOOD GLUCOSE        I&O's Summary    10 Apr 2018 07:01  -  11 Apr 2018 07:00  --------------------------------------------------------  IN: 620 mL / OUT: 2450 mL / NET: -1830 mL    11 Apr 2018 07:01  -  11 Apr 2018 11:03  --------------------------------------------------------  IN: 240 mL / OUT: 800 mL / NET: -560 mL        PHYSICAL EXAM:  GENERAL: NAD   HEAD:  Atraumatic, Normocephalic  EYES: EOMI, PERRLA, conjunctiva and sclera clear  NECK: Supple, No JVD  CHEST/LUNG: Crackles at bases; No wheeze  HEART: Irregular rhythm; No murmurs, rubs, or gallops  ABDOMEN: Soft, Nontender, Nondistended; Bowel sounds present  EXTREMITIES:  2+ Peripheral Pulses, No clubbing, cyanosis, +LE edema  PSYCH: AAOx3  NEUROLOGY: non-focal  SKIN: No rashes     LABS:                        10.0   4.60  )-----------( 113      ( 11 Apr 2018 07:28 )             32.4     04-11    131<L>  |  82<L>  |  41<H>  ----------------------------<  84  4.0   |  40<H>  |  1.31<H>    Ca    10.1      11 Apr 2018 06:01  Phos  4.0     04-11  Mg     2.3     04-11                RADIOLOGY & ADDITIONAL TESTS:    Imaging Personally Reviewed:  Tele reviewed: Afib 50-70s, LAURENT's     Consultant(s) Notes Reviewed:      Care Discussed with Consultants/Other Providers: Patient is a 77y old  Female who presents with a chief complaint of generalized weakness (31 Mar 2018 15:30)      SUBJECTIVE / OVERNIGHT EVENTS: Pt felt dizzy and lethargic after receiving Lasix this AM.     MEDICATIONS  (STANDING):  apixaban 2.5 milliGRAM(s) Oral every 12 hours  AQUAPHOR (petrolatum Ointment) 1 Application(s) Topical two times a day  carvedilol 12.5 milliGRAM(s) Oral every 12 hours  furosemide    Tablet 80 milliGRAM(s) Oral <User Schedule>  losartan 100 milliGRAM(s) Oral daily  polyethylene glycol 3350 17 Gram(s) Oral two times a day  ranolazine 500 milliGRAM(s) Oral two times a day  senna 2 Tablet(s) Oral at bedtime  sodium chloride 0.9% for Nebulization 3 milliLiter(s) Nebulizer four times a day  spironolactone 25 milliGRAM(s) Oral daily    MEDICATIONS  (PRN):      Vital Signs Last 24 Hrs  T(C): 37 (11 Apr 2018 04:32), Max: 37 (11 Apr 2018 04:32)  T(F): 98.6 (11 Apr 2018 04:32), Max: 98.6 (11 Apr 2018 04:32)  HR: 65 (11 Apr 2018 09:43) (50 - 74)  BP: 93/40 (11 Apr 2018 09:43) (93/40 - 153/75)  BP(mean): --  RR: 18 (11 Apr 2018 06:39) (18 - 22)  SpO2: 99% (11 Apr 2018 06:39) (97% - 100%)  CAPILLARY BLOOD GLUCOSE        I&O's Summary    10 Apr 2018 07:01  -  11 Apr 2018 07:00  --------------------------------------------------------  IN: 620 mL / OUT: 2450 mL / NET: -1830 mL    11 Apr 2018 07:01  -  11 Apr 2018 11:03  --------------------------------------------------------  IN: 240 mL / OUT: 800 mL / NET: -560 mL        PHYSICAL EXAM:  GENERAL: NAD   HEAD:  Atraumatic, Normocephalic  EYES: EOMI, PERRLA, conjunctiva and sclera clear  NECK: Supple, No JVD  CHEST/LUNG: Crackles at bases; No wheeze  HEART: Irregular rhythm; No murmurs, rubs, or gallops  ABDOMEN: Soft, Nontender, Nondistended; Bowel sounds present  EXTREMITIES:  2+ Peripheral Pulses, No clubbing, cyanosis, +LE edema  PSYCH: AAOx3  NEUROLOGY: non-focal  SKIN: No rashes     LABS:                        10.0   4.60  )-----------( 113      ( 11 Apr 2018 07:28 )             32.4     04-11    131<L>  |  82<L>  |  41<H>  ----------------------------<  84  4.0   |  40<H>  |  1.31<H>    Ca    10.1      11 Apr 2018 06:01  Phos  4.0     04-11  Mg     2.3     04-11                RADIOLOGY & ADDITIONAL TESTS:    Imaging Personally Reviewed:  Tele reviewed: Afib 50-70s, PVC's     Consultant(s) Notes Reviewed:      Care Discussed with Consultants/Other Providers: Spoke with Dr Russell, will lower Lasix to 60mg PO q12h

## 2018-04-11 NOTE — PROGRESS NOTE ADULT - SUBJECTIVE AND OBJECTIVE BOX
[facility] NEPHROLOGY- PROGRESS NOTE    77y female with history of CHF presents with weakness. Nephrology consulted for hyponatremia and volume overload.    REVIEW OF SYSTEMS:  Gen: no changes in weight  Cards: no chest pain  Resp: + dyspnea improving  GI: no nausea or vomiting or diarrhea  Vascular: + LE edema    latex (Unknown)  No Known Drug Allergies      Hospital Medications: Medications reviewed      VITALS:  T(F): 98.6 (04-11-18 @ 04:32), Max: 98.6 (04-11-18 @ 04:32)  HR: 65 (04-11-18 @ 09:43)  BP: 93/40 (04-11-18 @ 09:43)  RR: 18 (04-11-18 @ 06:39)  SpO2: 99% (04-11-18 @ 06:39)  Wt(kg): --    04-10 @ 07:01  -  04-11 @ 07:00  --------------------------------------------------------  IN: 620 mL / OUT: 2450 mL / NET: -1830 mL    04-11 @ 07:01  -  04-11 @ 12:13  --------------------------------------------------------  IN: 240 mL / OUT: 800 mL / NET: -560 mL      PHYSICAL EXAM:    Gen: NAD, calm  Cards: Irregularly irregular, +S1/S2, no M/G/R  Resp: CTA B/L  GI: soft, NT/ND, NABS  Vascular: 2+ RLE edema > LLE edema      LABS:  04-11    131<L>  |  82<L>  |  41<H>  ----------------------------<  84  4.0   |  40<H>  |  1.31<H>    Ca    10.1      11 Apr 2018 06:01  Phos  4.0     04-11  Mg     2.3     04-11      Creatinine Trend: 1.31 <--, 1.11 <--, 0.99 <--, 0.81 <--, 0.78 <--, 0.85 <--, 0.71 <--                        10.0   4.60  )-----------( 113      ( 11 Apr 2018 07:28 )             32.4

## 2018-04-11 NOTE — PROGRESS NOTE ADULT - PROBLEM SELECTOR PLAN 5
Patient with post-hypercapneic metabolic alkalosis as per most recent blood gas after pCO2 lowered (presumably with BIPAP). Serum C02 rising presumably due to diuretics. Check VBG or ABG to monitor pH.

## 2018-04-11 NOTE — PROGRESS NOTE ADULT - PROBLEM SELECTOR PLAN 2
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 Arciniega  - Should be on BIPAP at night   - pulm f/u

## 2018-04-11 NOTE — PROGRESS NOTE ADULT - PROBLEM SELECTOR PLAN 3
With excellent UO on current regimen. Given rising Scr, would hold evening lasix to prevent further KARAN. Monitor UO.

## 2018-04-11 NOTE — PROGRESS NOTE ADULT - PROBLEM SELECTOR PLAN 2
Multifactorial due to volume overload and diuretic use for which metolazone discontinued. Serum sodium stable. Continue with 0.8-1L FR. Monitor serum sodium.

## 2018-04-11 NOTE — PROGRESS NOTE ADULT - PROBLEM SELECTOR PLAN 1
Improving   -switched to Lasix 80mg PO q12h, patient reports difficulty tolerating current dose   -holding Metolazone d/t KARAN and hyponatremia    -continue Aldactone 25mg daily   -continue Losartan 100mg daily and Carvedilol 12.5mg BID  -monitor on tele   -closely monitor fluid status (strict Is/Os, daily weights), renal function, and electrolytes Improving   -switched to Lasix 80mg PO q12h, patient reports difficulty tolerating current dose, will decrease Lasix to 60mg PO q12h    -holding Metolazone d/t KARAN and hyponatremia    -continue Aldactone 25mg daily   -continue Losartan 100mg daily and Carvedilol 12.5mg BID  -monitor on tele   -closely monitor fluid status (strict Is/Os, daily weights), renal function, and electrolytes

## 2018-04-11 NOTE — PROGRESS NOTE ADULT - PROBLEM SELECTOR PLAN 3
- d/t diuresis   - holding Metolazone, transitioned to PO Lasix   - avoid nephrotoxic agents   - monitor Cr - d/t diuresis   - holding Metolazone, lowered dose of Lasix   - avoid nephrotoxic agents   - monitor Cr

## 2018-04-11 NOTE — PROGRESS NOTE ADULT - SUBJECTIVE AND OBJECTIVE BOX
Pulmonary  Progress    CHF, Cardiomyopathy  COPD    CHF exacerbation      lower extremity edema, improved    switching to oral diuretic regimen    ros no headache, no chest pain,   she has chronc edema    153/75  sat 99% hr 51-60    lungs diminished ar bases, no wheeze  cor irreg  abd nt soft protuberant  legs with edema  skin chronic changes    imp    CHF,  cardiomyopathy    improved edema and resp status    developing prerenal state    plan  would taper diuretic, follow sodium bun creatinine and bicarb    continue spironolactone    continue bipap at night , o2 in day    MD Haider Villasenor MD  [facility] [phone]

## 2018-04-11 NOTE — PROGRESS NOTE ADULT - SUBJECTIVE AND OBJECTIVE BOX
Patient is a 77y old  Female who presents with a chief complaint of generalized weakness (31 Mar 2018 15:30)      INTERVAL HISTORY: felt dizzy this am    TELEMETRY: no events  	  MEDICATIONS:  carvedilol 12.5 milliGRAM(s) Oral every 12 hours  losartan 100 milliGRAM(s) Oral daily  ranolazine 500 milliGRAM(s) Oral two times a day  spironolactone 25 milliGRAM(s) Oral daily        PHYSICAL EXAM:  T(C): 36.8 (04-11-18 @ 21:35), Max: 37 (04-11-18 @ 04:32)  HR: 52 (04-11-18 @ 21:35) (49 - 65)  BP: 115/47 (04-11-18 @ 21:35) (93/40 - 153/75)  RR: 18 (04-11-18 @ 21:35) (17 - 22)  SpO2: 98% (04-11-18 @ 21:35) (97% - 100%)  Wt(kg): --  I&O's Summary    10 Apr 2018 07:01  -  11 Apr 2018 07:00  --------------------------------------------------------  IN: 620 mL / OUT: 2450 mL / NET: -1830 mL    11 Apr 2018 07:01  -  11 Apr 2018 21:58  --------------------------------------------------------  IN: 780 mL / OUT: 1000 mL / NET: -220 mL          Appearance: Normal	  HEENT:    PERRL, EOMI	  Cardiovascular: Normal S1 S2, No JVD  Respiratory: Lungs clear to auscultation	  Psychiatry: Alert  Gastrointestinal:  Soft, Non-tender, + BS	  Skin: No rashes, No cyanosis  Extremities:   edema of LE much improved                                10.0   4.60  )-----------( 113      ( 11 Apr 2018 07:28 )             32.4     04-11    131<L>  |  82<L>  |  41<H>  ----------------------------<  84  4.0   |  40<H>  |  1.31<H>    Ca    10.1      11 Apr 2018 06:01  Phos  4.0     04-11  Mg     2.3     04-11          Labs, imaging and telemetry personally reviewed      ASSESSMENT/PLAN: 	  Assessment and Plan:   · Assessment		  77yoF w/ PMHx significant for HFrEF (LVEF = 19%) s/p AICD, CAD s/p stents w/ stable angina (pt on beta blocker and Ranolazine), Afib (on reduced dose Eliquis 2/2 "propensity for bleeding" as per family), interstitial fibrosis d/t Amiodarone toxicity, on home O2 (2.5L via NC; and intermittent NIV, unclear whether CPAP vs. BiPAP, though pt noncompliant), AAA s/p repair, who presents from home where she lives with her , w/ complaints of generalized weakness. She is being admitted for her complaints, along w/ acute on chronic respiratory failure / respiratory acidosis, and hyponatremia.      Problem/Plan - 1:  ·  Problem: Chronic systolic heart failure.  Plan: -   - c/w Lasix 40mg IV BID, switch to 80mg PO BID in am  - d/w renal, possible Tolvaptan of hypoNa persists, but improving for now                                     - closely monitor fluid status (strict Is/Os, daily weights, PEx), renal function, and electrolytes  - So far pt has lost >15 pounds since admission   - continue home Losartan 100mg daily, and Carvedilol 12.5mg BID.   - added aldactone to facilitate diuresis - monitor BMP daily   -TTE reviewed with Biventricular HF and severe Pulm HTN consistent with her 20+ year history of HF       Problem/Plan - 2:  ·  Problem: Acute on chronic respiratory failure with hypoxia and hypercapnia.  Plan: - on home O2 - 2.5L -  - pt chronic CO2 retainer with bicarb of 35 as outpt one month ago  - pt appears grossly volume overloaded  -  hold lasix tonight and restart Lasix to 60mg PO BID in am if Cr not rising     Problem/Plan - 3:  ·  Problem: Hyponatremia.  Plan: likely Hypervolemic hyponatremia  C/w Diuresis and trend Na.   - renal consult appreciated       Problem/Plan - 4:  ·  Problem: Coronary artery disease of native artery of native heart with stable angina pectoris.  Plan: - continue home ARB, beta blocker as above, and Ranexa 500mg BID       Problem/Plan - 5:  ·  Problem: Atrial fibrillation.  Plan: - rate controlled  - will continue anticoagulation w/ Eliquis 2.5mg BID (home dose)  - continue beta blocker as above.   - ICD interrogated with no events       Plan for discharge Friday is responds well to oral diuretics and ALEX bed available        Gamaliel Russell DO [facility]  Cardiovascular Medicine  [phone]

## 2018-04-12 ENCOUNTER — TRANSCRIPTION ENCOUNTER (OUTPATIENT)
Age: 78
End: 2018-04-12

## 2018-04-12 VITALS — WEIGHT: 181.22 LBS

## 2018-04-12 LAB
ANION GAP SERPL CALC-SCNC: 8 MMOL/L — SIGNIFICANT CHANGE UP (ref 5–17)
BASE EXCESS BLDA CALC-SCNC: 16.6 MMOL/L — HIGH (ref -2–2)
BUN SERPL-MCNC: 47 MG/DL — HIGH (ref 7–23)
CALCIUM SERPL-MCNC: 9.5 MG/DL — SIGNIFICANT CHANGE UP (ref 8.4–10.5)
CHLORIDE SERPL-SCNC: 83 MMOL/L — LOW (ref 96–108)
CO2 BLDA-SCNC: 45 MMOL/L — HIGH (ref 22–30)
CO2 SERPL-SCNC: 40 MMOL/L — HIGH (ref 22–31)
CREAT SERPL-MCNC: 1.38 MG/DL — HIGH (ref 0.5–1.3)
GAS PNL BLDA: SIGNIFICANT CHANGE UP
GLUCOSE SERPL-MCNC: 84 MG/DL — SIGNIFICANT CHANGE UP (ref 70–99)
HCO3 BLDA-SCNC: 43 MMOL/L — HIGH (ref 21–29)
HOROWITZ INDEX BLDA+IHG-RTO: 28 — SIGNIFICANT CHANGE UP
MAGNESIUM SERPL-MCNC: 2.4 MG/DL — SIGNIFICANT CHANGE UP (ref 1.6–2.6)
PCO2 BLDA: 63 MMHG — HIGH (ref 32–46)
PH BLDA: 7.45 — SIGNIFICANT CHANGE UP (ref 7.35–7.45)
PO2 BLDA: 130 MMHG — HIGH (ref 74–108)
POTASSIUM SERPL-MCNC: 3.7 MMOL/L — SIGNIFICANT CHANGE UP (ref 3.5–5.3)
POTASSIUM SERPL-SCNC: 3.7 MMOL/L — SIGNIFICANT CHANGE UP (ref 3.5–5.3)
SAO2 % BLDA: 100 % — HIGH (ref 92–96)
SODIUM SERPL-SCNC: 131 MMOL/L — LOW (ref 135–145)

## 2018-04-12 PROCEDURE — 80053 COMPREHEN METABOLIC PANEL: CPT

## 2018-04-12 PROCEDURE — 94640 AIRWAY INHALATION TREATMENT: CPT

## 2018-04-12 PROCEDURE — 83735 ASSAY OF MAGNESIUM: CPT

## 2018-04-12 PROCEDURE — 81001 URINALYSIS AUTO W/SCOPE: CPT

## 2018-04-12 PROCEDURE — 97162 PT EVAL MOD COMPLEX 30 MIN: CPT

## 2018-04-12 PROCEDURE — 82436 ASSAY OF URINE CHLORIDE: CPT

## 2018-04-12 PROCEDURE — 82947 ASSAY GLUCOSE BLOOD QUANT: CPT

## 2018-04-12 PROCEDURE — 84300 ASSAY OF URINE SODIUM: CPT

## 2018-04-12 PROCEDURE — 99239 HOSP IP/OBS DSCHRG MGMT >30: CPT

## 2018-04-12 PROCEDURE — 83930 ASSAY OF BLOOD OSMOLALITY: CPT

## 2018-04-12 PROCEDURE — 94660 CPAP INITIATION&MGMT: CPT

## 2018-04-12 PROCEDURE — 82570 ASSAY OF URINE CREATININE: CPT

## 2018-04-12 PROCEDURE — 99285 EMERGENCY DEPT VISIT HI MDM: CPT

## 2018-04-12 PROCEDURE — 85014 HEMATOCRIT: CPT

## 2018-04-12 PROCEDURE — 84133 ASSAY OF URINE POTASSIUM: CPT

## 2018-04-12 PROCEDURE — 82565 ASSAY OF CREATININE: CPT

## 2018-04-12 PROCEDURE — 84295 ASSAY OF SERUM SODIUM: CPT

## 2018-04-12 PROCEDURE — 82330 ASSAY OF CALCIUM: CPT

## 2018-04-12 PROCEDURE — 82435 ASSAY OF BLOOD CHLORIDE: CPT

## 2018-04-12 PROCEDURE — 85730 THROMBOPLASTIN TIME PARTIAL: CPT

## 2018-04-12 PROCEDURE — 97530 THERAPEUTIC ACTIVITIES: CPT

## 2018-04-12 PROCEDURE — 85610 PROTHROMBIN TIME: CPT

## 2018-04-12 PROCEDURE — 85027 COMPLETE CBC AUTOMATED: CPT

## 2018-04-12 PROCEDURE — 87086 URINE CULTURE/COLONY COUNT: CPT

## 2018-04-12 PROCEDURE — 36600 WITHDRAWAL OF ARTERIAL BLOOD: CPT

## 2018-04-12 PROCEDURE — 97116 GAIT TRAINING THERAPY: CPT

## 2018-04-12 PROCEDURE — 80048 BASIC METABOLIC PNL TOTAL CA: CPT

## 2018-04-12 PROCEDURE — 83880 ASSAY OF NATRIURETIC PEPTIDE: CPT

## 2018-04-12 PROCEDURE — 71045 X-RAY EXAM CHEST 1 VIEW: CPT

## 2018-04-12 PROCEDURE — 82553 CREATINE MB FRACTION: CPT

## 2018-04-12 PROCEDURE — 83935 ASSAY OF URINE OSMOLALITY: CPT

## 2018-04-12 PROCEDURE — 97110 THERAPEUTIC EXERCISES: CPT

## 2018-04-12 PROCEDURE — 93306 TTE W/DOPPLER COMPLETE: CPT

## 2018-04-12 PROCEDURE — 84132 ASSAY OF SERUM POTASSIUM: CPT

## 2018-04-12 PROCEDURE — 84484 ASSAY OF TROPONIN QUANT: CPT

## 2018-04-12 PROCEDURE — 84443 ASSAY THYROID STIM HORMONE: CPT

## 2018-04-12 PROCEDURE — 93005 ELECTROCARDIOGRAM TRACING: CPT

## 2018-04-12 PROCEDURE — 84100 ASSAY OF PHOSPHORUS: CPT

## 2018-04-12 PROCEDURE — 82550 ASSAY OF CK (CPK): CPT

## 2018-04-12 PROCEDURE — 70450 CT HEAD/BRAIN W/O DYE: CPT

## 2018-04-12 PROCEDURE — 82803 BLOOD GASES ANY COMBINATION: CPT

## 2018-04-12 PROCEDURE — 83605 ASSAY OF LACTIC ACID: CPT

## 2018-04-12 RX ORDER — SPIRONOLACTONE 25 MG/1
1 TABLET, FILM COATED ORAL
Qty: 0 | Refills: 0 | DISCHARGE
Start: 2018-04-12

## 2018-04-12 RX ORDER — POLYETHYLENE GLYCOL 3350 17 G/17G
17 POWDER, FOR SOLUTION ORAL
Qty: 0 | Refills: 0 | DISCHARGE
Start: 2018-04-12

## 2018-04-12 RX ORDER — SENNA PLUS 8.6 MG/1
2 TABLET ORAL
Qty: 0 | Refills: 0 | DISCHARGE
Start: 2018-04-12

## 2018-04-12 RX ORDER — FUROSEMIDE 40 MG
1 TABLET ORAL
Qty: 0 | Refills: 0 | COMMUNITY

## 2018-04-12 RX ORDER — PETROLATUM,WHITE
1 JELLY (GRAM) TOPICAL
Qty: 0 | Refills: 0 | DISCHARGE
Start: 2018-04-12

## 2018-04-12 RX ORDER — FUROSEMIDE 40 MG
1 TABLET ORAL
Qty: 0 | Refills: 0 | DISCHARGE
Start: 2018-04-12

## 2018-04-12 RX ORDER — LOSARTAN POTASSIUM 100 MG/1
1 TABLET, FILM COATED ORAL
Qty: 0 | Refills: 0 | DISCHARGE
Start: 2018-04-12

## 2018-04-12 RX ORDER — LOSARTAN POTASSIUM 100 MG/1
1 TABLET, FILM COATED ORAL
Qty: 0 | Refills: 0 | COMMUNITY

## 2018-04-12 RX ORDER — LOSARTAN POTASSIUM 100 MG/1
50 TABLET, FILM COATED ORAL DAILY
Qty: 0 | Refills: 0 | Status: DISCONTINUED | OUTPATIENT
Start: 2018-04-12 | End: 2018-04-12

## 2018-04-12 RX ORDER — POTASSIUM CHLORIDE 20 MEQ
40 PACKET (EA) ORAL ONCE
Qty: 0 | Refills: 0 | Status: COMPLETED | OUTPATIENT
Start: 2018-04-12 | End: 2018-04-12

## 2018-04-12 RX ORDER — FUROSEMIDE 40 MG
60 TABLET ORAL DAILY
Qty: 0 | Refills: 0 | Status: DISCONTINUED | OUTPATIENT
Start: 2018-04-12 | End: 2018-04-12

## 2018-04-12 RX ORDER — FUROSEMIDE 40 MG
3 TABLET ORAL
Qty: 0 | Refills: 0 | DISCHARGE
Start: 2018-04-12

## 2018-04-12 RX ADMIN — Medication 40 MILLIEQUIVALENT(S): at 10:07

## 2018-04-12 RX ADMIN — CARVEDILOL PHOSPHATE 12.5 MILLIGRAM(S): 80 CAPSULE, EXTENDED RELEASE ORAL at 10:07

## 2018-04-12 RX ADMIN — Medication 1 APPLICATION(S): at 06:05

## 2018-04-12 RX ADMIN — APIXABAN 2.5 MILLIGRAM(S): 2.5 TABLET, FILM COATED ORAL at 06:05

## 2018-04-12 RX ADMIN — Medication 60 MILLIGRAM(S): at 15:45

## 2018-04-12 RX ADMIN — SPIRONOLACTONE 25 MILLIGRAM(S): 25 TABLET, FILM COATED ORAL at 06:05

## 2018-04-12 RX ADMIN — APIXABAN 2.5 MILLIGRAM(S): 2.5 TABLET, FILM COATED ORAL at 17:29

## 2018-04-12 RX ADMIN — RANOLAZINE 500 MILLIGRAM(S): 500 TABLET, FILM COATED, EXTENDED RELEASE ORAL at 10:07

## 2018-04-12 RX ADMIN — LOSARTAN POTASSIUM 100 MILLIGRAM(S): 100 TABLET, FILM COATED ORAL at 06:05

## 2018-04-12 NOTE — PROGRESS NOTE ADULT - SUBJECTIVE AND OBJECTIVE BOX
[facility] NEPHROLOGY- PROGRESS NOTE    77y female with history of CHF presents with weakness. Nephrology consulted for hyponatremia and volume overload.    REVIEW OF SYSTEMS:  Gen: no changes in weight  Cards: no chest pain  Resp: + dyspnea improving  GI: no nausea or vomiting or diarrhea  Vascular: + LE edema    latex (Unknown)  No Known Drug Allergies      Hospital Medications: Medications reviewed      VITALS:  T(F): 98.1 (04-12-18 @ 04:22), Max: 98.2 (04-11-18 @ 21:35)  HR: 60 (04-12-18 @ 04:22)  BP: 115/56 (04-12-18 @ 04:22)  RR: 20 (04-12-18 @ 04:22)  SpO2: 97% (04-12-18 @ 04:22)  Wt(kg): --    04-11 @ 07:01  -  04-12 @ 07:00  --------------------------------------------------------  IN: 780 mL / OUT: 2200 mL / NET: -1420 mL      PHYSICAL EXAM:    Gen: NAD, calm  Cards: Irregularly irregular, +S1/S2, no M/G/R  Resp: CTA B/L  GI: soft, NT/ND, NABS  Vascular: 1+ RLE edema > LLE edema      LABS:  04-12    131<L>  |  83<L>  |  47<H>  ----------------------------<  84  3.7   |  40<H>  |  1.38<H>    Ca    9.5      12 Apr 2018 05:57  Phos  4.0     04-11  Mg     2.4     04-12      Creatinine Trend: 1.38 <--, 1.31 <--, 1.11 <--, 0.99 <--, 0.81 <--, 0.78 <--, 0.85 <--                        10.0   4.60  )-----------( 113      ( 11 Apr 2018 07:28 )             32.4

## 2018-04-12 NOTE — DISCHARGE NOTE ADULT - CARE PROVIDERS DIRECT ADDRESSES
,DirectAddress_Unknown,DirectAddress_Unknown,[email or web address]

## 2018-04-12 NOTE — DISCHARGE NOTE ADULT - PLAN OF CARE
optimal cardiac medication management Weigh yourself daily.  If you gain 3lbs in 3 days, or 5lbs in a week call your Health Care Provider.  Do not eat or drink foods containing more than 2000mg of salt (sodium) in your diet every day.  Call your Health Care Provider if you have any swelling or increased swelling in your feet, ankles, and/or stomach.  Take all of your medication as directed.  If you become dizzy call your Health Care Provider.  MUST SEE CARDIOLOGY Dr. Russell within 7-10 days after discharge Atrial fibrillation is the most common heart rhythm problem & has the risk of stroke & heart attack  It helps if you control your blood pressure, not drink more than 1-2 alcohol drinks per day, cut down on caffeine, getting treatment for over active thyroid gland, & getting exercise  Call your doctor if you feel your heart racing or beating unusually, chest tightness or pain, lightheaded, faint, shortness of breath especially with exercise  It is important to take your heart medication as prescribed  You are on Eliquis for anticoagulation & stroke prevention  Eliquis/Apixaban is used to thin the blood so clots will not form and to keep existing ones from getting bigger.  Take this medication daily as prescribed by your health care provider.  Take this medication with food to prevent upset stomach.  If you miss a dose call your health care provider or pharmacist right away.  Tell your doctor you use this drug before you have a spinal or epidural procedure  Tell dentists, surgeon, and other doctors that you use this drug.  You may bleed more easily.  Be careful and avoid injury.  Use a soft toothbrush and an electric razor. Avoid taking (NSAIDs) - (ex: Ibuprofen, Advil, Celebrex, Naprosyn)  Avoid taking any nephrotoxic agents (can harm kidneys) - Intravenous contrast for diagnostic testing, combination cold medications.  Have all medications adjusted for your renal function by your Health Care Provider.  Blood pressure control is important.  Take all medication as prescribed. nasal cannula @ 2L/min with bipap 5/10/30% QHS  needs help with home bipap problems please check BMP in AM 4/13 Avoid taking (NSAIDs) - (ex: Ibuprofen, Advil, Celebrex, Naprosyn)  Avoid taking any nephrotoxic agents (can harm kidneys) - Intravenous contrast for diagnostic testing, combination cold medications.  Have all medications adjusted for your renal function by your Health Care Provider.  Blood pressure control is important.  Take all medication as prescribed.  Monitor renal function.

## 2018-04-12 NOTE — PROGRESS NOTE PEDS - PROBLEM SELECTOR PLAN 3
- d/t diuresis, Cr stabilizing   - holding Metolazone, lowered dose of Lasix   - avoid nephrotoxic agents   - monitor Cr

## 2018-04-12 NOTE — PROGRESS NOTE ADULT - PROVIDER SPECIALTY LIST ADULT
Cardiology
Hospitalist
Internal Medicine
Internal Medicine
Nephrology
Podiatry
Podiatry
Pulmonology
Cardiology
Hospitalist

## 2018-04-12 NOTE — DISCHARGE NOTE ADULT - MEDICATION SUMMARY - MEDICATIONS TO CHANGE
I will SWITCH the dose or number of times a day I take the medications listed below when I get home from the hospital:    Lasix 80 mg oral tablet  -- 1 tab(s) by mouth once a day    Cozaar 100 mg oral tablet  -- 1 tab(s) by mouth once a day

## 2018-04-12 NOTE — PROGRESS NOTE PEDS - PROBLEM SELECTOR PLAN 2
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 Arciniega  - Continue supplemental O2, BIPAP at night   - pulm f/u

## 2018-04-12 NOTE — PROGRESS NOTE PEDS - ATTENDING COMMENTS
DC planning to ALEX DC planning to [facility], 38 minutes spent coordinating discharge. POC discussed with both daughter in detail.

## 2018-04-12 NOTE — PROGRESS NOTE ADULT - PROBLEM SELECTOR PLAN 5
Patient with mixed disorder as per ABG this morning (respiratory acidosis and metabolic alkalosis) with overall alkalemia. No need for diamox.

## 2018-04-12 NOTE — PROGRESS NOTE ADULT - ATTENDING COMMENTS
[facility] NEPHROLOGY  Obinna Simmons M.D.  Asad Sylvester D.O.  Tiffany Neves M.D.  Abigail Mathews, MSN, ANP-C    Telephone: [phone]  Facsimile: [phone]    [address] [place] [address]
[facility] NEPHROLOGY  Obinna Simmons M.D.  Asad Sylvester D.O.  Tiffany Neves M.D.  Abigail Mathews, MSN, ANP-C    Telephone: [phone]  Facsimile: [phone]    [address] [place] [address]
[facility]  Obinna Simmons M.D.  Asad Sylvester D.O.  Tiffany Neves M.D.  Abigail Mathews, MSN, ANP-C    Telephone: [phone]  Facsimile: [phone]    [address] [place] [address]
[facility] NEPHROLOGY  Obinna Simmons M.D.  Asad Sylvester D.O.  Tiffany Neves M.D.  Abigail Mathews, MSN, ANP-C    Telephone: [phone]  Facsimile: [phone]    [address] [place] [address]

## 2018-04-12 NOTE — PROGRESS NOTE PEDS - SUBJECTIVE AND OBJECTIVE BOX
Patient is a 77y old  Female who presents with a chief complaint of generalized weakness (31 Mar 2018 15:30)      SUBJECTIVE / OVERNIGHT EVENTS: Feeling well, no new complaints.     MEDICATIONS  (STANDING):  apixaban 2.5 milliGRAM(s) Oral every 12 hours  AQUAPHOR (petrolatum Ointment) 1 Application(s) Topical two times a day  carvedilol 12.5 milliGRAM(s) Oral every 12 hours  losartan 50 milliGRAM(s) Oral daily  polyethylene glycol 3350 17 Gram(s) Oral two times a day  ranolazine 500 milliGRAM(s) Oral two times a day  senna 2 Tablet(s) Oral at bedtime  sodium chloride 0.9% for Nebulization 3 milliLiter(s) Nebulizer four times a day  spironolactone 25 milliGRAM(s) Oral daily    MEDICATIONS  (PRN):      Vital Signs Last 24 Hrs  T(C): 36.7 (12 Apr 2018 04:22), Max: 36.8 (11 Apr 2018 21:35)  T(F): 98.1 (12 Apr 2018 04:22), Max: 98.2 (11 Apr 2018 21:35)  HR: 51 (12 Apr 2018 11:09) (49 - 68)  BP: 123/72 (12 Apr 2018 10:09) (102/64 - 123/72)  BP(mean): --  RR: 20 (12 Apr 2018 04:22) (17 - 20)  SpO2: 98% (12 Apr 2018 11:09) (97% - 100%)  CAPILLARY BLOOD GLUCOSE        I&O's Summary    11 Apr 2018 07:01  -  12 Apr 2018 07:00  --------------------------------------------------------  IN: 780 mL / OUT: 2200 mL / NET: -1420 mL    12 Apr 2018 07:01  -  12 Apr 2018 12:35  --------------------------------------------------------  IN: 120 mL / OUT: 300 mL / NET: -180 mL        PHYSICAL EXAM:  GENERAL: NAD   HEAD:  Atraumatic, Normocephalic  EYES: EOMI, PERRLA, conjunctiva and sclera clear  NECK: Supple, No JVD  CHEST/LUNG: Crackles at bases; No wheeze  HEART: Irregular rhythm; No murmurs, rubs, or gallops  ABDOMEN: Soft, Nontender, Nondistended; Bowel sounds present  EXTREMITIES:  2+ Peripheral Pulses, No clubbing, cyanosis, +LE edema  PSYCH: AAOx3  NEUROLOGY: non-focal  SKIN: No rashes     LABS:                        10.0   4.60  )-----------( 113      ( 11 Apr 2018 07:28 )             32.4     04-12    131<L>  |  83<L>  |  47<H>  ----------------------------<  84  3.7   |  40<H>  |  1.38<H>    Ca    9.5      12 Apr 2018 05:57  Phos  4.0     04-11  Mg     2.4     04-12                RADIOLOGY & ADDITIONAL TESTS:    Imaging Personally Reviewed:  Tele reviewed: Afib/Vpaced     Consultant(s) Notes Reviewed:      Care Discussed with Consultants/Other Providers:

## 2018-04-12 NOTE — PROGRESS NOTE PEDS - PROBLEM SELECTOR PLAN 1
Improving   -Lasix decreased to 60mg PO daily as per renal   -holding Metolazone d/t KARAN and hyponatremia    -continue Aldactone 25mg daily   -continue Losartan 100mg daily and Carvedilol 12.5mg BID  -monitor on tele   -closely monitor fluid status (strict Is/Os, daily weights), renal function, and electrolytes Improving   -Lasix decreased to 60mg PO daily as per renal   -holding Metolazone d/t KARAN and hyponatremia    -continue Aldactone 25mg daily and Carvedilol 12.5mg BID  -decrease Losartan 50mg daily to avoid hypotension   -monitor on tele   -closely monitor fluid status (strict Is/Os, daily weights), renal function, and electrolytes

## 2018-04-12 NOTE — DISCHARGE NOTE ADULT - REASON FOR ADMISSION
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

## 2018-04-12 NOTE — DISCHARGE NOTE ADULT - HOSPITAL COURSE
77yoF w/ PMHx significant for HFrEF (LVEF = 19%) s/p AICD, CAD s/p stents w/ stable angina (pt on beta blocker and Ranolazine), Afib (on reduced dose Eliquis 2/2 "propensity for bleeding" as per family), interstitial fibrosis d/t Amiodarone toxicity, on home O2 (2.5L via NC; and intermittent NIV, unclear whether CPAP vs. BiPAP, though pt noncompliant), AAA s/p repair, a/w acute on chronic respiratory failure due to pulmonary fibrosis and acute on chronic systolic heart failure, respiratory acidosis, and hyponatremia. Patient now on BIPAP only at night, diuresing well on the IV lasix 40mg bid, patient continues to need IV diuresis, patient with chronic metabolic alkalosis now with slight worsening - started on aldactone 4/4     Problem/Plan - 1:  ·  Problem: Acute on chronic systolic heart failure.  Plan: Improving   -Lasix decreased to 60mg PO daily as per renal   -holding Metolazone d/t KARAN and hyponatremia    -continue Aldactone 25mg daily and Carvedilol 12.5mg BID  -decrease Losartan 50mg daily to avoid hypotension   -monitor on tele   -closely monitor fluid status (strict Is/Os, daily weights), renal function, and electrolytes.     Problem/Plan - 2:  ·  Problem: Acute on chronic respiratory failure with hypoxia and hypercapnia.  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 Arciniega  - Continue supplemental O2, BIPAP at night   - pulm f/u.      Problem/Plan - 3:  ·  Problem: KARAN (acute kidney injury).  Plan: - d/t diuresis, Cr stabilizing   - holding Metolazone, lowered dose of Lasix   - avoid nephrotoxic agents   - monitor Cr.      Problem/Plan - 4:  ·  Problem: Hyponatremia.  Plan: Hypervolemic hyponatremia and possibly related to Metolazone use  - C/w diuresis   - Fluid restriction 800ml/day  - hold Metolazone   - monitor Na.      Problem/Plan - 5:  ·  Problem: Atrial fibrillation.  Plan: - rate controlled  - will continue anticoagulation w/ Eliquis 2.5mg BID (as per pt, she tends to bleed, and was then placed on reduced dosing, despite no restrictions based on age/weight/renal function)  - continue beta blocker as above.      Problem/Plan - 6:  Problem: Thrombocytopenia. Plan: Chronic, lower than baseline of 110-130s  - no signs/sx of bleeding/bruising  - will continue to monitor.     Problem/Plan - 7:  ·  Problem: Normocytic anemia.  Plan: - chronic and at baseline as per review of HIE (Hgb in 9-10s).      Problem/Plan - 8:  ·  Problem: Coronary artery disease of native artery of native heart with stable angina pectoris.  Plan: - continue home ARB, beta blocker as above, and Ranexa 500mg BID  - pt states she does not need to be on a statin despite hx of PCI w/ stents to RCA.      Problem/Plan - 9:  ·  Problem: Constipation.  Plan: c/w colace, miralax PRN, moving bowels. 77yoF w/ PMHx significant for HFrEF (LVEF = 19%) s/p AICD, CAD s/p stents w/ stable angina (pt on beta blocker and Ranolazine), Afib (on reduced dose Eliquis 2/2 "propensity for bleeding" as per family), interstitial fibrosis d/t Amiodarone toxicity on home O2 (2.5L via NC; and intermittent NIV), AAA s/p repair, a/w acute on chronic respiratory failure due to pulmonary fibrosis and acute on chronic systolic heart failure. Patient evaluated by cardiology and pulmonary. Pt with supplemental O2 and BIPAP qhs, she is a chronic CO2 retainer. Pt was diuresed with Lasix 40mg IV q12h, as well as Aldactone and Metolazone. Volume status drastically improved. Hypervolemic hyponatremia worsened after initiating Metolazone. Patient was continued on fluid restriction. Course complicated by prerenal KARAN due to diuresis. Metolazone was stopped and Lasix was gradually de-escalated to 60mg PO daily. In addition, patient's dose of Losartan was lowered to 50mg to prevent hypotension.

## 2018-04-12 NOTE — DISCHARGE NOTE ADULT - PATIENT PORTAL LINK FT
You can access the [facility] Portal, offered by [facility], by registering with the following website: [email or web address]

## 2018-04-12 NOTE — PROGRESS NOTE ADULT - PROBLEM SELECTOR PLAN 3
Improving with excellent UO on 4/11 on lasix 60 mg daily and aldactone 25 mg daily. Would restart lasix 60 mg at reduced frequency (daily) on 4/13. Monitor UO.

## 2018-04-12 NOTE — PROGRESS NOTE ADULT - SUBJECTIVE AND OBJECTIVE BOX
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 requiring chronic oxygen and nightly bipap, acute on CKD w/ baseline creatinine 0.7 (12 Apr 2018 16:24)      INTERVAL HISTORY: feels better, not dizzy    TELEMETRY: no events  	  MEDICATIONS:  carvedilol 12.5 milliGRAM(s) Oral every 12 hours  furosemide    Tablet 60 milliGRAM(s) Oral daily  losartan 50 milliGRAM(s) Oral daily  ranolazine 500 milliGRAM(s) Oral two times a day  spironolactone 25 milliGRAM(s) Oral daily        PHYSICAL EXAM:  T(C): 36.8 (04-12-18 @ 13:44), Max: 36.8 (04-12-18 @ 13:44)  HR: 59 (04-12-18 @ 15:47) (51 - 68)  BP: 124/62 (04-12-18 @ 15:47) (95/43 - 124/62)  RR: 20 (04-12-18 @ 13:44) (20 - 20)  SpO2: 99% (04-12-18 @ 13:44) (97% - 99%)  Wt(kg): --  I&O's Summary    11 Apr 2018 07:01  -  12 Apr 2018 07:00  --------------------------------------------------------  IN: 780 mL / OUT: 2200 mL / NET: -1420 mL    12 Apr 2018 07:01  -  12 Apr 2018 21:42  --------------------------------------------------------  IN: 240 mL / OUT: 1050 mL / NET: -810 mL          Appearance: Normal	  HEENT:    PERRL, EOMI	  Cardiovascular: Normal S1 S2, No JVD  Respiratory: Lungs clear to auscultation	  Psychiatry: Alert  Gastrointestinal:  Soft, Non-tender, + BS	  Skin: No rashes, No cyanosis  Extremities:  edema of LE much improved                                10.0   4.60  )-----------( 113      ( 11 Apr 2018 07:28 )             32.4     04-12    131<L>  |  83<L>  |  47<H>  ----------------------------<  84  3.7   |  40<H>  |  1.38<H>    Ca    9.5      12 Apr 2018 05:57  Phos  4.0     04-11  Mg     2.4     04-12          Labs, imaging and telemetry personally reviewed        Assessment and Plan:   · Assessment		  77yoF w/ PMHx significant for HFrEF (LVEF = 19%) s/p AICD, CAD s/p stents w/ stable angina (pt on beta blocker and Ranolazine), Afib (on reduced dose Eliquis 2/2 "propensity for bleeding" as per family), interstitial fibrosis d/t Amiodarone toxicity, on home O2 (2.5L via NC; and intermittent NIV, unclear whether CPAP vs. BiPAP, though pt noncompliant), AAA s/p repair, who presents from home where she lives with her , w/ complaints of generalized weakness. She is being admitted for her complaints, along w/ acute on chronic respiratory failure / respiratory acidosis, and hyponatremia.      Problem/Plan - 1:  ·  Problem: Chronic systolic heart failure.  Plan: -   -  Lasix 60mg daily  - So far pt has lost >15 pounds since admission   - continue home Losartan 100mg daily, and Carvedilol 12.5mg BID.   - added aldactone to facilitate diuresis   -TTE reviewed with Biventricular HF and severe Pulm HTN consistent with her 20+ year history of HF       Problem/Plan - 2:  ·  Problem: Acute on chronic respiratory failure with hypoxia and hypercapnia.  Plan: - on home O2 - 2.5L -  - pt chronic CO2 retainer with bicarb of 35 as outpt one month ago  - pt appears grossly volume overloaded  -Lasix to 60mg PO daily      Problem/Plan - 3:  ·  Problem: Hyponatremia.  Plan: likely Hypervolemic hyponatremia  C/w Diuresis and trend Na.   - renal consult appreciated       Problem/Plan - 4:  ·  Problem: Coronary artery disease of native artery of native heart with stable angina pectoris.  Plan: - continue home ARB, beta blocker as above, and Ranexa 500mg BID       Problem/Plan - 5:  ·  Problem: Atrial fibrillation.  Plan: - rate controlled  - will continue anticoagulation w/ Eliquis 2.5mg BID (home dose)  - continue beta blocker as above.   - ICD interrogated with no events     I discussed above with pt and her daughter. I emphasized strongly the importance of following up with her cardiologist Dr Vidal within 7-10 days for assessment of volume status, renal function and potasium level. States she will arrange for appt while she is still in rehab    Gamaliel Russell DO [facility]  Cardiovascular Medicine  [phone]

## 2018-04-12 NOTE — DISCHARGE NOTE ADULT - CARE PROVIDER_API CALL
Obinna Simmons), Internal Medicine; Nephrology  [address] [place] [address]  Phone: [phone]  Fax: [phone]    Gamaliel Russell (DO), Cardiovascular Disease; Internal Medicine; Nuclear Cardiology  [address] [facility]  [address] [place] [address]  Phone: [phone]  Fax: [phone]    Obi Arciniega), Critical Care Medicine; Internal Medicine; Pulmonary Disease  [address] [address]  Phone: [phone]  Fax: [phone]    Silvio Arango (DPKEISHA), Podiatric Medicine and Surgery  [address] [place] [address]  Phone: [phone]  Fax: [phone]

## 2018-04-12 NOTE — DISCHARGE NOTE ADULT - MEDICATION SUMMARY - MEDICATIONS TO TAKE
I will START or STAY ON the medications listed below when I get home from the hospital:    spironolactone 25 mg oral tablet  -- 1 tab(s) by mouth once a day  -- Indication: For CHF (congestive heart failure)    losartan 50 mg oral tablet  -- 1 tab(s) by mouth once a day  -- Indication: For CHF (congestive heart failure)    Ranexa 500 mg oral tablet, extended release  -- 1 tab(s) by mouth 2 times a day  -- Indication: For Coronary artery disease of native artery of native heart with stable angina pectoris    Eliquis 2.5 mg oral tablet  -- 1 tab(s) by mouth 2 times a day  -- Indication: For Atrial fibrillation & stroke preventio    carvedilol 12.5 mg oral tablet  -- 1 tab(s) by mouth 2 times a day  -- Indication: For CHF (congestive heart failure)    petrolatum topical ointment  -- 1 application on skin 2 times a day to legs  -- Indication: For skin protection    furosemide 20 mg oral tablet  -- 3 tab(s) = 60 mg  by mouth once a day  -- Indication: For CHF (congestive heart failure)    senna oral tablet  -- 2 tab(s) by mouth once a day (at bedtime)  -- Indication: For Constipation    polyethylene glycol 3350 oral powder for reconstitution  -- 17 gram(s) by mouth 2 times a day  -- Indication: For Constipation

## 2018-04-12 NOTE — DISCHARGE NOTE ADULT - CARE PLAN
Principal Discharge DX:	Acute on chronic combined systolic and diastolic congestive heart failure  Goal:	optimal cardiac medication management  Assessment and plan of treatment:	Weigh yourself daily.  If you gain 3lbs in 3 days, or 5lbs in a week call your Health Care Provider.  Do not eat or drink foods containing more than 2000mg of salt (sodium) in your diet every day.  Call your Health Care Provider if you have any swelling or increased swelling in your feet, ankles, and/or stomach.  Take all of your medication as directed.  If you become dizzy call your Health Care Provider.  MUST SEE CARDIOLOGY Dr. Russell within 7-10 days after discharge  Secondary Diagnosis:	Atrial fibrillation  Assessment and plan of treatment:	Atrial fibrillation is the most common heart rhythm problem & has the risk of stroke & heart attack  It helps if you control your blood pressure, not drink more than 1-2 alcohol drinks per day, cut down on caffeine, getting treatment for over active thyroid gland, & getting exercise  Call your doctor if you feel your heart racing or beating unusually, chest tightness or pain, lightheaded, faint, shortness of breath especially with exercise  It is important to take your heart medication as prescribed  You are on Eliquis for anticoagulation & stroke prevention  Eliquis/Apixaban is used to thin the blood so clots will not form and to keep existing ones from getting bigger.  Take this medication daily as prescribed by your health care provider.  Take this medication with food to prevent upset stomach.  If you miss a dose call your health care provider or pharmacist right away.  Tell your doctor you use this drug before you have a spinal or epidural procedure  Tell dentists, surgeon, and other doctors that you use this drug.  You may bleed more easily.  Be careful and avoid injury.  Use a soft toothbrush and an electric razor.  Secondary Diagnosis:	Acute on chronic kidney failure  Assessment and plan of treatment:	Avoid taking (NSAIDs) - (ex: Ibuprofen, Advil, Celebrex, Naprosyn)  Avoid taking any nephrotoxic agents (can harm kidneys) - Intravenous contrast for diagnostic testing, combination cold medications.  Have all medications adjusted for your renal function by your Health Care Provider.  Blood pressure control is important.  Take all medication as prescribed.  Secondary Diagnosis:	Pulmonary fibrosis  Assessment and plan of treatment:	nasal cannula @ 2L/min with bipap 5/10/30% QHS  needs help with home bipap problems  Secondary Diagnosis:	Hyponatremia  Assessment and plan of treatment:	please check BMP in AM 4/13 Principal Discharge DX:	Acute on chronic combined systolic and diastolic congestive heart failure  Goal:	optimal cardiac medication management  Assessment and plan of treatment:	Weigh yourself daily.  If you gain 3lbs in 3 days, or 5lbs in a week call your Health Care Provider.  Do not eat or drink foods containing more than 2000mg of salt (sodium) in your diet every day.  Call your Health Care Provider if you have any swelling or increased swelling in your feet, ankles, and/or stomach.  Take all of your medication as directed.  If you become dizzy call your Health Care Provider.  MUST SEE CARDIOLOGY Dr. Russell within 7-10 days after discharge  Secondary Diagnosis:	Atrial fibrillation  Assessment and plan of treatment:	Atrial fibrillation is the most common heart rhythm problem & has the risk of stroke & heart attack  It helps if you control your blood pressure, not drink more than 1-2 alcohol drinks per day, cut down on caffeine, getting treatment for over active thyroid gland, & getting exercise  Call your doctor if you feel your heart racing or beating unusually, chest tightness or pain, lightheaded, faint, shortness of breath especially with exercise  It is important to take your heart medication as prescribed  You are on Eliquis for anticoagulation & stroke prevention  Eliquis/Apixaban is used to thin the blood so clots will not form and to keep existing ones from getting bigger.  Take this medication daily as prescribed by your health care provider.  Take this medication with food to prevent upset stomach.  If you miss a dose call your health care provider or pharmacist right away.  Tell your doctor you use this drug before you have a spinal or epidural procedure  Tell dentists, surgeon, and other doctors that you use this drug.  You may bleed more easily.  Be careful and avoid injury.  Use a soft toothbrush and an electric razor.  Secondary Diagnosis:	Acute on chronic kidney failure  Assessment and plan of treatment:	Avoid taking (NSAIDs) - (ex: Ibuprofen, Advil, Celebrex, Naprosyn)  Avoid taking any nephrotoxic agents (can harm kidneys) - Intravenous contrast for diagnostic testing, combination cold medications.  Have all medications adjusted for your renal function by your Health Care Provider.  Blood pressure control is important.  Take all medication as prescribed.  Monitor renal function.  Secondary Diagnosis:	Pulmonary fibrosis  Assessment and plan of treatment:	nasal cannula @ 2L/min with bipap 5/10/30% QHS  needs help with home bipap problems  Secondary Diagnosis:	Hyponatremia  Assessment and plan of treatment:	please check BMP in AM 4/13

## 2018-04-12 NOTE — PROGRESS NOTE ADULT - PROBLEM SELECTOR PROBLEM 2
Acute on chronic respiratory failure with hypoxia and hypercapnia
Edema of lower extremity
Edema of lower extremity
Hyponatremia
Hyponatremia
Acute on chronic respiratory failure with hypoxia and hypercapnia

## 2018-04-12 NOTE — DISCHARGE NOTE ADULT - ADDITIONAL INSTRUCTIONS
follow up care as per subacute rehab facility protocol  follow up with podiatry Dr. Arango  as directed  follow up with cardiology Dr. Russell after rehab stay  follow up with renal Dr. Simmons as needed  follow up with pulmonary Dr. Arciniega after rehab stay follow up care as per subacute rehab facility protocol  follow up with podiatry Dr. Arango  as directed  follow up with cardiology Dr. Russell - MUST SEE cardiology 7-10 days after discharge  follow up with renal Dr. Simmons as needed  follow up with pulmonary Dr. Arciniega after rehab stay

## 2018-04-12 NOTE — PROGRESS NOTE ADULT - PROBLEM SELECTOR PLAN 1
Patient with KARAN likely hemodynamically mediated secondary to decrease EABV in setting of diuretic and losartan use with relative hypotension. Scr relatively stable. Avoid nephrotoxins.

## 2018-05-03 PROBLEM — I50.9 HEART FAILURE, UNSPECIFIED: Chronic | Status: ACTIVE | Noted: 2018-03-31

## 2018-05-03 PROBLEM — J84.10 PULMONARY FIBROSIS, UNSPECIFIED: Chronic | Status: ACTIVE | Noted: 2018-03-31

## 2018-05-11 ENCOUNTER — APPOINTMENT (OUTPATIENT)
Dept: PULMONOLOGY | Facility: CLINIC | Age: 78
End: 2018-05-11
Payer: MEDICARE

## 2018-05-11 VITALS
OXYGEN SATURATION: 96 % | RESPIRATION RATE: 16 BRPM | HEART RATE: 56 BPM | TEMPERATURE: 97.9 F | SYSTOLIC BLOOD PRESSURE: 140 MMHG | BODY MASS INDEX: 34.13 KG/M2 | DIASTOLIC BLOOD PRESSURE: 70 MMHG | WEIGHT: 169 LBS

## 2018-05-11 PROCEDURE — 99214 OFFICE O/P EST MOD 30 MIN: CPT

## 2018-05-11 RX ORDER — CEFTRIAXONE 10 G/1
10 INJECTION, POWDER, FOR SOLUTION INTRAVENOUS
Qty: 180 | Refills: 0 | Status: DISCONTINUED | COMMUNITY
Start: 2017-09-27 | End: 2018-05-11

## 2018-05-11 RX ORDER — AMOXICILLIN AND CLAVULANATE POTASSIUM 875; 125 MG/1; MG/1
875-125 TABLET, COATED ORAL TWICE DAILY
Qty: 28 | Refills: 1 | Status: DISCONTINUED | COMMUNITY
Start: 2018-03-06 | End: 2018-05-11

## 2018-05-11 RX ORDER — CEFTRIAXONE 2 G/1
2 INJECTION, POWDER, FOR SOLUTION INTRAMUSCULAR; INTRAVENOUS
Qty: 20 | Refills: 0 | Status: DISCONTINUED | COMMUNITY
Start: 2017-09-20 | End: 2018-05-11

## 2018-05-11 RX ORDER — FUROSEMIDE 80 MG/1
80 TABLET ORAL
Qty: 90 | Refills: 0 | Status: DISCONTINUED | COMMUNITY
Start: 2018-02-19 | End: 2018-05-11

## 2018-05-14 ENCOUNTER — APPOINTMENT (OUTPATIENT)
Dept: PULMONOLOGY | Facility: CLINIC | Age: 78
End: 2018-05-14

## 2018-06-11 ENCOUNTER — APPOINTMENT (OUTPATIENT)
Dept: PULMONOLOGY | Facility: CLINIC | Age: 78
End: 2018-06-11
Payer: MEDICARE

## 2018-06-11 VITALS
SYSTOLIC BLOOD PRESSURE: 150 MMHG | WEIGHT: 172 LBS | OXYGEN SATURATION: 99 % | HEART RATE: 64 BPM | TEMPERATURE: 98.1 F | RESPIRATION RATE: 16 BRPM | DIASTOLIC BLOOD PRESSURE: 72 MMHG | BODY MASS INDEX: 34.74 KG/M2

## 2018-06-11 PROCEDURE — 99214 OFFICE O/P EST MOD 30 MIN: CPT

## 2018-07-17 ENCOUNTER — APPOINTMENT (OUTPATIENT)
Dept: WOUND CARE | Facility: CLINIC | Age: 78
End: 2018-07-17
Payer: MEDICARE

## 2018-07-17 VITALS
SYSTOLIC BLOOD PRESSURE: 131 MMHG | TEMPERATURE: 98 F | HEART RATE: 80 BPM | HEIGHT: 59 IN | WEIGHT: 172 LBS | BODY MASS INDEX: 34.68 KG/M2 | DIASTOLIC BLOOD PRESSURE: 68 MMHG

## 2018-07-17 PROCEDURE — 11042 DBRDMT SUBQ TIS 1ST 20SQCM/<: CPT

## 2018-07-31 ENCOUNTER — APPOINTMENT (OUTPATIENT)
Dept: WOUND CARE | Facility: CLINIC | Age: 78
End: 2018-07-31
Payer: MEDICARE

## 2018-07-31 DIAGNOSIS — L84 CORNS AND CALLOSITIES: ICD-10-CM

## 2018-07-31 PROCEDURE — 11042 DBRDMT SUBQ TIS 1ST 20SQCM/<: CPT

## 2018-08-07 ENCOUNTER — APPOINTMENT (OUTPATIENT)
Dept: WOUND CARE | Facility: CLINIC | Age: 78
End: 2018-08-07
Payer: MEDICARE

## 2018-08-07 VITALS
TEMPERATURE: 98.2 F | RESPIRATION RATE: 16 BRPM | SYSTOLIC BLOOD PRESSURE: 153 MMHG | HEART RATE: 53 BPM | DIASTOLIC BLOOD PRESSURE: 92 MMHG

## 2018-08-07 PROCEDURE — 11042 DBRDMT SUBQ TIS 1ST 20SQCM/<: CPT

## 2018-08-14 ENCOUNTER — APPOINTMENT (OUTPATIENT)
Dept: WOUND CARE | Facility: CLINIC | Age: 78
End: 2018-08-14
Payer: MEDICARE

## 2018-08-14 VITALS — DIASTOLIC BLOOD PRESSURE: 74 MMHG | TEMPERATURE: 97.8 F | HEART RATE: 50 BPM | SYSTOLIC BLOOD PRESSURE: 123 MMHG

## 2018-08-14 PROCEDURE — 11042 DBRDMT SUBQ TIS 1ST 20SQCM/<: CPT

## 2018-08-17 ENCOUNTER — APPOINTMENT (OUTPATIENT)
Dept: WOUND CARE | Facility: CLINIC | Age: 78
End: 2018-08-17
Payer: MEDICARE

## 2018-08-17 PROCEDURE — 11042 DBRDMT SUBQ TIS 1ST 20SQCM/<: CPT

## 2018-08-21 ENCOUNTER — APPOINTMENT (OUTPATIENT)
Dept: WOUND CARE | Facility: CLINIC | Age: 78
End: 2018-08-21

## 2018-08-22 ENCOUNTER — APPOINTMENT (OUTPATIENT)
Dept: WOUND CARE | Facility: CLINIC | Age: 78
End: 2018-08-22
Payer: MEDICARE

## 2018-08-22 PROCEDURE — 11042 DBRDMT SUBQ TIS 1ST 20SQCM/<: CPT

## 2018-08-29 ENCOUNTER — APPOINTMENT (OUTPATIENT)
Dept: WOUND CARE | Facility: CLINIC | Age: 78
End: 2018-08-29
Payer: MEDICARE

## 2018-08-29 PROCEDURE — 11042 DBRDMT SUBQ TIS 1ST 20SQCM/<: CPT

## 2018-09-05 ENCOUNTER — APPOINTMENT (OUTPATIENT)
Dept: WOUND CARE | Facility: CLINIC | Age: 78
End: 2018-09-05
Payer: MEDICARE

## 2018-09-05 DIAGNOSIS — L97.512 NON-PRESSURE CHRONIC ULCER OF OTHER PART OF RIGHT FOOT WITH FAT LAYER EXPOSED: ICD-10-CM

## 2018-09-05 PROCEDURE — 11042 DBRDMT SUBQ TIS 1ST 20SQCM/<: CPT

## 2018-09-10 ENCOUNTER — APPOINTMENT (OUTPATIENT)
Dept: PULMONOLOGY | Facility: CLINIC | Age: 78
End: 2018-09-10
Payer: MEDICARE

## 2018-09-10 VITALS
DIASTOLIC BLOOD PRESSURE: 58 MMHG | BODY MASS INDEX: 35.55 KG/M2 | SYSTOLIC BLOOD PRESSURE: 124 MMHG | TEMPERATURE: 98.1 F | HEART RATE: 56 BPM | OXYGEN SATURATION: 98 % | WEIGHT: 176 LBS

## 2018-09-10 PROCEDURE — 99214 OFFICE O/P EST MOD 30 MIN: CPT

## 2018-09-12 ENCOUNTER — APPOINTMENT (OUTPATIENT)
Dept: WOUND CARE | Facility: CLINIC | Age: 78
End: 2018-09-12
Payer: MEDICARE

## 2018-09-12 DIAGNOSIS — L97.329 NON-PRESSURE CHRONIC ULCER OF LEFT ANKLE WITH UNSPECIFIED SEVERITY: ICD-10-CM

## 2018-09-12 PROBLEM — L97.512 CHRONIC FOOT ULCER, RIGHT, WITH FAT LAYER EXPOSED: Status: ACTIVE | Noted: 2018-03-13

## 2018-09-12 PROCEDURE — 99212 OFFICE O/P EST SF 10 MIN: CPT

## 2018-09-17 ENCOUNTER — APPOINTMENT (OUTPATIENT)
Dept: WOUND CARE | Facility: CLINIC | Age: 78
End: 2018-09-17
Payer: MEDICARE

## 2018-09-17 DIAGNOSIS — L03.032 CELLULITIS OF LEFT TOE: ICD-10-CM

## 2018-09-17 PROCEDURE — 99213 OFFICE O/P EST LOW 20 MIN: CPT

## 2018-09-21 ENCOUNTER — APPOINTMENT (OUTPATIENT)
Dept: WOUND CARE | Facility: CLINIC | Age: 78
End: 2018-09-21

## 2018-09-28 ENCOUNTER — APPOINTMENT (OUTPATIENT)
Dept: WOUND CARE | Facility: CLINIC | Age: 78
End: 2018-09-28
Payer: MEDICARE

## 2018-09-28 PROCEDURE — 99212 OFFICE O/P EST SF 10 MIN: CPT

## 2018-10-18 ENCOUNTER — APPOINTMENT (OUTPATIENT)
Dept: VASCULAR SURGERY | Facility: CLINIC | Age: 78
End: 2018-10-18
Payer: MEDICARE

## 2018-10-18 VITALS
BODY MASS INDEX: 35.48 KG/M2 | SYSTOLIC BLOOD PRESSURE: 149 MMHG | TEMPERATURE: 98.3 F | DIASTOLIC BLOOD PRESSURE: 84 MMHG | WEIGHT: 176 LBS | HEART RATE: 61 BPM | HEIGHT: 59 IN

## 2018-10-18 VITALS — HEART RATE: 60 BPM | DIASTOLIC BLOOD PRESSURE: 79 MMHG | SYSTOLIC BLOOD PRESSURE: 143 MMHG

## 2018-10-18 PROCEDURE — 99203 OFFICE O/P NEW LOW 30 MIN: CPT

## 2018-10-29 ENCOUNTER — OTHER (OUTPATIENT)
Age: 78
End: 2018-10-29

## 2018-11-01 ENCOUNTER — APPOINTMENT (OUTPATIENT)
Dept: INTERVENTIONAL RADIOLOGY/VASCULAR | Facility: CLINIC | Age: 78
End: 2018-11-01
Payer: MEDICARE

## 2018-11-01 VITALS
BODY MASS INDEX: 35.28 KG/M2 | SYSTOLIC BLOOD PRESSURE: 148 MMHG | DIASTOLIC BLOOD PRESSURE: 66 MMHG | OXYGEN SATURATION: 99 % | HEIGHT: 59 IN | WEIGHT: 175 LBS | HEART RATE: 54 BPM | RESPIRATION RATE: 20 BRPM

## 2018-11-01 PROCEDURE — 99205 OFFICE O/P NEW HI 60 MIN: CPT

## 2018-11-01 RX ORDER — ALBUTEROL SULFATE 2.5 MG/3ML
(2.5 MG/3ML) SOLUTION RESPIRATORY (INHALATION)
Qty: 150 | Refills: 0 | Status: COMPLETED | COMMUNITY
Start: 2017-09-05 | End: 2018-11-01

## 2018-11-27 ENCOUNTER — APPOINTMENT (OUTPATIENT)
Dept: INTERNAL MEDICINE | Facility: CLINIC | Age: 78
End: 2018-11-27
Payer: MEDICARE

## 2018-11-27 ENCOUNTER — MED ADMIN CHARGE (OUTPATIENT)
Age: 78
End: 2018-11-27

## 2018-11-27 ENCOUNTER — RECORD ABSTRACTING (OUTPATIENT)
Age: 78
End: 2018-11-27

## 2018-11-27 VITALS
RESPIRATION RATE: 16 BRPM | WEIGHT: 178 LBS | HEIGHT: 59 IN | OXYGEN SATURATION: 92 % | DIASTOLIC BLOOD PRESSURE: 76 MMHG | SYSTOLIC BLOOD PRESSURE: 144 MMHG | HEART RATE: 61 BPM | BODY MASS INDEX: 35.88 KG/M2 | TEMPERATURE: 98.1 F

## 2018-11-27 DIAGNOSIS — Z87.09 PERSONAL HISTORY OF OTHER DISEASES OF THE RESPIRATORY SYSTEM: ICD-10-CM

## 2018-11-27 PROCEDURE — 99215 OFFICE O/P EST HI 40 MIN: CPT

## 2018-11-27 RX ORDER — NITROGLYCERIN 0.4 MG/1
0.4 TABLET SUBLINGUAL
Qty: 25 | Refills: 0 | Status: ACTIVE | COMMUNITY
Start: 2018-06-22

## 2018-11-27 RX ORDER — LOSARTAN POTASSIUM 25 MG/1
25 TABLET, FILM COATED ORAL
Qty: 90 | Refills: 0 | Status: DISCONTINUED | COMMUNITY
Start: 2017-06-16 | End: 2018-11-27

## 2018-11-27 NOTE — PHYSICAL EXAM

## 2018-11-28 ENCOUNTER — MEDICATION RENEWAL (OUTPATIENT)
Age: 78
End: 2018-11-28

## 2018-11-28 DIAGNOSIS — H10.9 UNSPECIFIED CONJUNCTIVITIS: ICD-10-CM

## 2019-01-07 ENCOUNTER — APPOINTMENT (OUTPATIENT)
Dept: PULMONOLOGY | Facility: CLINIC | Age: 79
End: 2019-01-07
Payer: MEDICARE

## 2019-01-07 VITALS
DIASTOLIC BLOOD PRESSURE: 68 MMHG | HEART RATE: 62 BPM | TEMPERATURE: 97.9 F | BODY MASS INDEX: 35.95 KG/M2 | SYSTOLIC BLOOD PRESSURE: 128 MMHG | WEIGHT: 178 LBS | RESPIRATION RATE: 16 BRPM | OXYGEN SATURATION: 98 %

## 2019-01-07 PROCEDURE — 99214 OFFICE O/P EST MOD 30 MIN: CPT

## 2019-01-07 RX ORDER — CEFUROXIME AXETIL 500 MG/1
500 TABLET ORAL
Qty: 20 | Refills: 0 | Status: DISCONTINUED | COMMUNITY
Start: 2018-11-27 | End: 2019-01-07

## 2019-01-07 NOTE — DISCUSSION/SUMMARY
[FreeTextEntry1] : She is a 78 year-old woman with a history of CHF, pneumonitis (presumably due to amiodarone toxicity) which has resolved, coronary artery disease and hypertension. She was hospitalized in early 2018 at [facility] for an exacerbation of CHF. Her overnight oximetry did not show desaturation when on oxygen at 2 LPM. \par \par Her congestive heart failure it is adequately controlled. She was advised to continue other medications as per cardiology.\par \par She was advised to continue with oxygen, 24 hours per day.\par \par I will see her again in 4 months. Sooner if necessary.\par \par \par \par

## 2019-01-07 NOTE — REVIEW OF SYSTEMS
[Edema] : ~T edema was present [Fatigue] : no fatigue [Nasal Congestion] : no nasal congestion [Epistaxis] : no nosebleeds [Hemoptysis] : no hemoptysis [Dyspnea] : no dyspnea [PND] : no PND [Palpitations] : no palpitations [Nasal Discharge] : no nasal discharge [Heartburn] : no heartburn [Nocturia] : no nocturia [Back Pain] : ~T no back pain [Rash] : no [unfilled] rash [Anemia] : no anemia [Depression] : no depression [DVT] : no DVT [Snoring] : no snoring

## 2019-01-07 NOTE — PHYSICAL EXAM
[General Appearance - In No Acute Distress] : no acute distress [Neck Appearance] : the appearance of the neck was normal [Neck Cervical Mass (___cm)] : no neck mass was observed [Heart Sounds] : normal S1 and S2 [Auscultation Breath Sounds / Voice Sounds] : lungs were clear to auscultation bilaterally [Bowel Sounds] : normal bowel sounds [Abnormal Walk] : normal gait [Cyanosis, Localized] : no localized cyanosis [1+ Pitting] : 1+  pitting [Skin Turgor] : normal skin turgor [No Focal Deficits] : no focal deficits [Oriented To Time, Place, And Person] : oriented to person, place, and time [] : no rash [Erythema] : no erythema of the pharynx

## 2019-01-07 NOTE — HISTORY OF PRESENT ILLNESS
[FreeTextEntry1] : She came for a routine followup visit today. Denies any cough, wheezing or chest pain. No shortness of breath. Her leg ulcer was taken care of by the wound care center. Now on Entresto. On oxygen 24 hours per day.

## 2019-01-07 NOTE — PROCEDURE
[FreeTextEntry1] : A CT examination of the chest was performed on June 23, 2017 at [facility]. There were small bilateral pleural effusions. There was cardiomegaly. No evidence of any pulmonary nodules noted.\par \par ABG from  6/14/18: 7.47/42/185 on O2 at 3 LPM. \par \par Pulmonary function test February 10, 2017. There was mild obstruction. There was mild restriction. Diffusion was moderately reduced. No significant change noted after inhalation of bronchodilator

## 2019-05-13 ENCOUNTER — APPOINTMENT (OUTPATIENT)
Dept: PULMONOLOGY | Facility: CLINIC | Age: 79
End: 2019-05-13

## 2019-05-20 ENCOUNTER — APPOINTMENT (OUTPATIENT)
Dept: PULMONOLOGY | Facility: CLINIC | Age: 79
End: 2019-05-20
Payer: MEDICARE

## 2019-05-20 VITALS
HEART RATE: 68 BPM | DIASTOLIC BLOOD PRESSURE: 70 MMHG | TEMPERATURE: 98.7 F | WEIGHT: 186 LBS | SYSTOLIC BLOOD PRESSURE: 144 MMHG | BODY MASS INDEX: 37.57 KG/M2 | RESPIRATION RATE: 16 BRPM | OXYGEN SATURATION: 92 %

## 2019-05-20 VITALS — HEART RATE: 61 BPM | OXYGEN SATURATION: 96 %

## 2019-05-20 PROCEDURE — 99214 OFFICE O/P EST MOD 30 MIN: CPT

## 2019-05-20 NOTE — DISCUSSION/SUMMARY
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 She was hospitalized in early 2018 at [facility] for an exacerbation of CHF. Her overnight oximetry did not show desaturation when on oxygen at 2 LPM. \par \par Her congestive heart failure it is adequately controlled. She was advised to continue other medications as per cardiology.\par \par She is to continue oxygen at night and with exertion as needed. Has portable oxygen. \par \par I will see her again in 6 months. Sooner if necessary.

## 2019-05-20 NOTE — PHYSICAL EXAM
[General Appearance - In No Acute Distress] : no acute distress [Neck Cervical Mass (___cm)] : no neck mass was observed [Heart Sounds] : normal S1 and S2 [Auscultation Breath Sounds / Voice Sounds] : lungs were clear to auscultation bilaterally [Bowel Sounds] : normal bowel sounds [Abnormal Walk] : normal gait [Cyanosis, Localized] : no localized cyanosis [1+ Pitting] : 1+  pitting [Skin Turgor] : normal skin turgor [] : no rash [No Focal Deficits] : no focal deficits [Oriented To Time, Place, And Person] : oriented to person, place, and time [Normal Appearance] : normal appearance [Erythema] : no erythema of the pharynx

## 2019-05-20 NOTE — REVIEW OF SYSTEMS
[Fever] : no fever [Fatigue] : no fatigue [Nasal Congestion] : no nasal congestion [Epistaxis] : no nosebleeds [Cough] : no cough [Hemoptysis] : no hemoptysis [Dyspnea] : no dyspnea [Wheezing] : no wheezing [Chest Discomfort] : no chest discomfort [PND] : no PND [Palpitations] : no palpitations [Edema] : ~T edema was not present [Nasal Discharge] : no nasal discharge [Heartburn] : no heartburn [Nocturia] : no nocturia [Back Pain] : ~T no back pain [Rash] : no [unfilled] rash [Anemia] : no anemia [Depression] : no depression [DVT] : no DVT [Snoring] : no snoring

## 2019-05-20 NOTE — HISTORY OF PRESENT ILLNESS
[FreeTextEntry1] : She came for a routine followup visit today. Denies any cough, wheezing or chest pain. No shortness of breath. Has been getting P/T and O/T at home. Now has a walker. Has not been using oxygen except when she is sleeping. Pulse oximetry running between 94 to 95 % on room air at rest.

## 2019-05-20 NOTE — PROCEDURE
[FreeTextEntry1] : A CT examination of the chest was performed on June 23, 2017 at [facility]. There were small bilateral pleural effusions. There was cardiomegaly. No evidence of any pulmonary nodules noted.\par \par ABG from  6/14/18: 7.47/42/185 on O2 at 3 LPM. \par \par Pulmonary function test February 10, 2017. There was mild obstruction. There was mild restriction. Diffusion was moderately reduced. No significant change noted after inhalation of bronchodilator

## 2019-07-25 NOTE — H&P ADULT - NEGATIVE SKIN SYMPTOMS
INFECTIOUS DISEASE PROGRESS NOTE    Kya Dhillon  35 year old female  MRN : [id number]    Date: 7/25/2019     Interval history: thoracentesis yesterday evening      Subjective:  Feels much better after thoracentesis yesterday, CT not placed   Denies fever, chills, N/V, diarrhea    Reason for consult: E coli bacteremia     Vitals:   Vitals:    07/25/19 0650   BP: 130/81   Pulse: 78   Resp: 18   Temp: 97.9 °F (36.6 °C)       Physical Examination:  General : Awake, Alert, Oriented x 3. No acute distress. Well developed, well nourished. Diffusely jaundice.   HEENT : Head is normocephalic, atraumatic. PERLLA. + scleral icterus. Oral mucosa moist without lesions. Good dentition.    Neck : Supple, No LAD. No thyromegaly.   Lungs : NAD, diminished lung sounds throughout left lung base. No wheezes, crackles, or rhonchi. No accessory muscle use.   Heart : Regular rate and rhythm. + soft systolic murmur, no gallops, or rubs.  Abdomen : Soft, positive bowel sounds, Non tender, moderately distended.  Musculoskeletal : No clubbing, cyanosis. Mild edema in bilateral lower extremities.  Skin : Warm and dry, No lesions, rashes, or ulcers. Jaundice.   Neurological : CN II - XII grossly intact. Grossly non focal.  Psych : appropriate mood and affect     Medications:    cefTRIAXone (ROCEPHIN) IVPB 2,000 mg Intravenous Daily   ferrous sulfate 325 mg Oral BID WC   lactulose 20 g Oral TID   octreotide 100 mcg Subcutaneous TID   rifAXIMin 550 mg Oral 2 times per day   folic acid 1 mg Oral Daily   vitamin - therapeutic multivitamins w/minerals 1 tablet Oral Daily   thiamine 100 mg Oral Daily   spironolactone 50 mg Oral Daily   furosemide 20 mg Oral Daily   sodium chloride (PF) 2 mL Injection 2 times per day       Laboratory data:    Recent Labs   Lab 07/25/19  0656 07/25/19  0550 07/24/19  0535 07/23/19  1413 07/23/19  0549 07/22/19  1334   WBC  --  3.8* 3.4*  --  3.8*  --    HCT 21.6* 21.7* 22.2*  --  22.3*  --    HGB 7.1* 6.8* 7.3*  --   7.3*  --    PLT  --  38* 36*  --  38*  --    INR  --  2.2 2.5 2.2  --  3.2   PTT  --   --   --   --   --  67*   SODIUM  --  140 141  --  142  --    POTASSIUM  --  4.3 4.1  --  4.0  --    CHLORIDE  --  108* 109*  --  110*  --    CO2  --  23 24  --  21  --    CALCIUM  --  9.2 9.1  --  8.8  --    GLUCOSE  --  90 97  --  96  --    BUN  --  13 11  --  10  --    CREATININE  --  0.69 0.73  --  0.72  --    AST  --  45* 41*  --  36  --    GPT  --  21 21  --  17  --    ALKPT  --  94 80  --  76  --    BILIRUBIN  --  24.4* 26.0*  --  26.8*  --    ALBUMIN  --  4.2 4.6  --  4.4  --    GFRNA  --  >90 >90  --  >90  --        Imaging:   CT chest (7/21):   1.  Large left pleural effusion and moderate adjacent left lung consolidation. Small right pleural effusion.  2.  Partially visualized perihepatic free fluid and nodular hepatic contour. The liver and abdomen is better evaluated on multiphase CT performed the same day.  3.  Cardiomegaly.  4.  Cholelithiasis.     CT liver (7/21):   Cirrhosis with upper abdominal varices and small amount of ascites.  No focal lesions or biliary ductal dilatation.  Cholelithiasis.    XR Orthopantogram (7/22):   1.  No significant dental caries or periapical lucencies identified.  2.  Extracted/absent left maxillary third molar and unerupted bilateral  mandibular third molars.  3.  Multiple dental amalgams noted.    CXR (7/24):  1.  No pneumothorax status post thoracentesis earlier today. The pleural  effusion has only modestly decreased in the interval.  2.  There is some persistent residual left pleural effusion evident on this  AP view.     Culture data:   Blood (7/20): NGTD  Urine (7/20): negative      Blood cultures reportedly positive for E coli (sensitive to ceftriaxone) on admission in [place] (7/14/19)      Isolation: None      Antibiotics:   Ceftriaxone (started on 7/14/19)      Assessment / Diagnoses:  This is a 35 year old female with history of alcoholic cirrhosis presents as transfer from  [place] for OLT evaluation as well as management of ongoing acute issues.      1. E coli bacteremia, initially admitted to outside hospital with sepsis, which has since resolved. Ceftriaxone started 7/14.   2. Left lower lobe exudative pleural effusion with moderate associated consolidation concerning for parapneumonic effusion s/p thoracentesis 7/24   3. Fluid overload with ascites and pleural effusion as above  4. 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 Ongoing OLT work-up.   5. ISAIAH on admission to outside hospital, now resolved   6. Cardiomegaly vs Pericardial effusion noted on CXR.   7. Elevated INR, improved; thrombocytopenia.     Plan / Recommendations:  1. Continue Ceftriaxone. End of therapy 7/28/19 (Can transition to oral levofloxacin on discharge to complete course if discharged prior to that)  2. Exudative effusion by Light's criteria. Likely parapneumonic. Feeling better after thoracentesis yesterday however still fair amount of fluid present. Planning repeat thoracentesis today. No chest tube was placed due to elevated INR.   3. Will continue to follow       Discussed with the patient and Dr. Pinto.      Annie Malik PA-C  Infectious Disease  Pager: [phone]  Office: [phone]      ID ATTENDING PHYSICIAN ADDENDUM    Patient seen and examined independently. Chart reviewed. Case discussed in detail with Annie Malik PA-C    Agree with the above documentation. Looks and feels much better after undergoing thoracentesis. Fluid seems to be borderline exudative but no evidence of empyema.     Continue antibiotics as ordered for a few more days.      Tyree Pinto MD  Infectious disease  Pager : [phone]     no rash/no itching

## 2019-08-28 ENCOUNTER — APPOINTMENT (OUTPATIENT)
Dept: WOUND CARE | Facility: CLINIC | Age: 79
End: 2019-08-28
Payer: MEDICARE

## 2019-08-28 VITALS
WEIGHT: 185 LBS | HEART RATE: 63 BPM | HEIGHT: 59 IN | DIASTOLIC BLOOD PRESSURE: 71 MMHG | BODY MASS INDEX: 37.29 KG/M2 | SYSTOLIC BLOOD PRESSURE: 147 MMHG | TEMPERATURE: 97.8 F

## 2019-08-28 DIAGNOSIS — I83.029 VARICOSE VEINS OF LEFT LOWER EXTREMITY WITH ULCER OF UNSPECIFIED SITE: ICD-10-CM

## 2019-08-28 DIAGNOSIS — L97.509 NON-PRESSURE CHRONIC ULCER OF OTHER PART OF UNSPECIFIED FOOT WITH UNSPECIFIED SEVERITY: ICD-10-CM

## 2019-08-28 DIAGNOSIS — L97.322 NON-PRESSURE CHRONIC ULCER OF LEFT ANKLE WITH FAT LAYER EXPOSED: ICD-10-CM

## 2019-08-28 DIAGNOSIS — L97.929 VARICOSE VEINS OF LEFT LOWER EXTREMITY WITH ULCER OF UNSPECIFIED SITE: ICD-10-CM

## 2019-08-28 PROCEDURE — 99213 OFFICE O/P EST LOW 20 MIN: CPT

## 2019-08-28 NOTE — ASSESSMENT
[FreeTextEntry1] : Patient to use mupirocin and ace wraps\par strongly recommend patient use compressive wraps\par no signs of infection\par patient needs to see vascular physician for evaluation of lymphedema and venous insufficiency both legs\par No signs of erythema no signs of infection\par patient to follow up with vascular since there is no signs of ulcerations both legs

## 2019-08-28 NOTE — HISTORY OF PRESENT ILLNESS
[de-identified] : Patient presents for follow up left leg drainage. Venous stasis insufficiency both legs\par Pinhole drainage with weeping left leg\par DDP and PT 1/4 both feet\par \par

## 2019-09-19 ENCOUNTER — APPOINTMENT (OUTPATIENT)
Dept: VASCULAR SURGERY | Facility: CLINIC | Age: 79
End: 2019-09-19
Payer: MEDICARE

## 2019-09-19 VITALS
DIASTOLIC BLOOD PRESSURE: 73 MMHG | SYSTOLIC BLOOD PRESSURE: 161 MMHG | HEART RATE: 62 BPM | TEMPERATURE: 98.5 F | HEIGHT: 59 IN | WEIGHT: 185 LBS | BODY MASS INDEX: 37.29 KG/M2

## 2019-09-19 VITALS — SYSTOLIC BLOOD PRESSURE: 151 MMHG | DIASTOLIC BLOOD PRESSURE: 69 MMHG | HEART RATE: 61 BPM

## 2019-09-19 DIAGNOSIS — I87.2 VENOUS INSUFFICIENCY (CHRONIC) (PERIPHERAL): ICD-10-CM

## 2019-09-19 PROCEDURE — 99212 OFFICE O/P EST SF 10 MIN: CPT

## 2019-09-20 PROBLEM — I87.2 VENOUS STASIS DERMATITIS OF BOTH LOWER EXTREMITIES: Status: ACTIVE | Noted: 2018-07-31

## 2019-09-20 NOTE — REVIEW OF SYSTEMS
[Leg Claudication] : no intermittent leg claudication [Melena] : no melena [Abdominal Pain] : no abdominal pain [Limb Weakness] : no limb weakness [Negative] : Constitutional

## 2019-09-20 NOTE — PHYSICAL EXAM
[Ankle Swelling (On Exam)] : present [Ankle Swelling Bilaterally] : bilaterally  [Varicose Veins Of Lower Extremities] : present [Skin Ulcer] : no ulcer [Oriented to Person] : oriented to person [Alert] : alert [Oriented to Time] : oriented to time [Oriented to Place] : oriented to place [de-identified] : obese female, NAD [Calm] : calm [de-identified] : unlabored  [FreeTextEntry1] : Legs are warm and appear well perfused\par 2+ ankle edema, +palpable varicosities in bilateral LE [de-identified] : intact, LE with purplish hyperpigmentation [de-identified] : obese, NT

## 2019-09-20 NOTE — HISTORY OF PRESENT ILLNESS
[FreeTextEntry1] : 79 year old female presents today for AAA follow up. She is s/p EVAR 2006 in [facility] with Dr. Turk. Has had multiple revisions for an expanding aneurysmal sac (bilateral iliac artery extensions and hypogastric embolization). \par \par PMH:CAD, coronary stent, Afib, HTN, pulmonary fibrosis on home 02, hernia repair, AAA s/p EVAR (2006 with Dr. Turk)\par \par Since her last visit, she has been evaluated by Dr. Polanco. Per last IR note, the patient's residual aortic aneurysm sac has been stable (slightly over 8 cm for 2-3 years) with low suspicion for endoleak. Risks/benefits of a possible diagnostic intervention was discussed but deferred for the time being. \par \par Today, the patient has no new complaints today. She is on supplemental O2 due to pulmonary fibrosis 2/2 past amiodarone use. \par \par Recent CTA (9/2019) demonstrates a patent aortoiliac stent. Residual sac measure 7.8cm (7.7 in 2018). 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 L hypogastric is s/p embolization and R hypogastric appears thrombosed. Also noted:  possible liver cirrhosis, 6mm hepatic cyst and moderate size ventral hernia with transverse colon.\par \par

## 2019-09-20 NOTE — ASSESSMENT
[FreeTextEntry1] : 79 year old female presents today for AAA follow up. She is s/p EVAR 2006 in [facility] with Dr. Turk. Has had multiple revisions for an expanding aneurysmal sac (bilateral iliac artery extensions and hypogastric embolization). \par \par PMH:CAD, coronary stent, Afib, HTN, pulmonary fibrosis on home 02, hernia repair, AAA s/p EVAR (2006 with Dr. Turk)\par \par Since her last visit, she has been evaluated by Dr. Polanco. Per last IR note, the patient's residual aortic aneurysm sac has been stable (slightly over 8 cm for 2-3 years) with low suspicion for endoleak. Risks/benefits of a possible diagnostic intervention was discussed but deferred for the time being. \par \par Today, the patient has no new complaints today. She is on supplemental O2 due to pulmonary fibrosis 2/2 past amiodarone use. \par \par Recent CTA (9/2019) demonstrates a patent aortoiliac stent. Residual sac measure 7.8cm (7.7 in 2018). 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 L hypogastric is s/p embolization and R hypogastric appears thrombosed. Also noted:  possible liver cirrhosis, 6mm hepatic cyst and moderate size ventral hernia with transverse colon.\par \par On exam, legs are warm and appear well perfused. Skin of LE is discolored but intact. There is 2+ pitting edema in the ankles and calves as well as palpable varicosities. Abdomen is non-tender to palpation. \par \par Recommend continued surveillance of residual aneurysm and endograft. Will have patient follow up in 6 months for an aortoiliac ultrasound. Rx given for CS. Discussed with Dr. Severino. \par \par Patient also given copy of CTA report. She will speak with her medical provider Dr. Cheney in regards to the other findings in the report. \par  86

## 2019-09-27 ENCOUNTER — APPOINTMENT (OUTPATIENT)
Dept: VASCULAR SURGERY | Facility: CLINIC | Age: 79
End: 2019-09-27

## 2019-09-30 ENCOUNTER — APPOINTMENT (OUTPATIENT)
Dept: INTERNAL MEDICINE | Facility: CLINIC | Age: 79
End: 2019-09-30
Payer: MEDICARE

## 2019-09-30 VITALS
WEIGHT: 185 LBS | DIASTOLIC BLOOD PRESSURE: 70 MMHG | RESPIRATION RATE: 12 BRPM | HEART RATE: 68 BPM | BODY MASS INDEX: 37.29 KG/M2 | TEMPERATURE: 97.7 F | HEIGHT: 59 IN | SYSTOLIC BLOOD PRESSURE: 119 MMHG

## 2019-09-30 DIAGNOSIS — R93.2 ABNORMAL FINDINGS ON DIAGNOSTIC IMAGING OF LIVER AND BILIARY TRACT: ICD-10-CM

## 2019-09-30 DIAGNOSIS — Z00.00 ENCOUNTER FOR GENERAL ADULT MEDICAL EXAMINATION W/OUT ABNORMAL FINDINGS: ICD-10-CM

## 2019-09-30 PROCEDURE — 99214 OFFICE O/P EST MOD 30 MIN: CPT

## 2019-09-30 RX ORDER — CARVEDILOL 6.25 MG/1
6.25 TABLET, FILM COATED ORAL
Qty: 60 | Refills: 0 | Status: ACTIVE | COMMUNITY
Start: 2018-04-27

## 2019-09-30 RX ORDER — APIXABAN 2.5 MG/1
2.5 TABLET, FILM COATED ORAL
Qty: 180 | Refills: 0 | Status: ACTIVE | COMMUNITY

## 2019-09-30 RX ORDER — SPIRONOLACTONE 25 MG/1
25 TABLET ORAL
Qty: 30 | Refills: 0 | Status: ACTIVE | COMMUNITY
Start: 2018-04-27

## 2019-09-30 RX ORDER — SACUBITRIL AND VALSARTAN 24; 26 MG/1; MG/1
24-26 TABLET, FILM COATED ORAL
Refills: 0 | Status: ACTIVE | COMMUNITY

## 2019-09-30 NOTE — PHYSICAL EXAM
[No Acute Distress] : no acute distress [Well Nourished] : well nourished [Well-Appearing] : well-appearing [Well Developed] : well developed [Normal Sclera/Conjunctiva] : normal sclera/conjunctiva [PERRL] : pupils equal round and reactive to light [EOMI] : extraocular movements intact [Normal Oropharynx] : the oropharynx was normal [Normal Outer Ear/Nose] : the outer ears and nose were normal in appearance [No Lymphadenopathy] : no lymphadenopathy [No JVD] : no jugular venous distention [Supple] : supple [Thyroid Normal, No Nodules] : the thyroid was normal and there were no nodules present [No Accessory Muscle Use] : no accessory muscle use [Clear to Auscultation] : lungs were clear to auscultation bilaterally [No Respiratory Distress] : no respiratory distress  [Normal Rate] : normal rate  [Regular Rhythm] : with a regular rhythm [Normal S1, S2] : normal S1 and S2 [No Murmur] : no murmur heard [No Carotid Bruits] : no carotid bruits [No Varicosities] : no varicosities [No Abdominal Bruit] : a ~M bruit was not heard ~T in the abdomen [No Edema] : there was no peripheral edema [Pedal Pulses Present] : the pedal pulses are present [No Extremity Clubbing/Cyanosis] : no extremity clubbing/cyanosis [No Palpable Aorta] : no palpable aorta [Soft] : abdomen soft [Non Tender] : non-tender [Non-distended] : non-distended [No Masses] : no abdominal mass palpated [Normal Bowel Sounds] : normal bowel sounds [No HSM] : no HSM [Normal Posterior Cervical Nodes] : no posterior cervical lymphadenopathy [Normal Anterior Cervical Nodes] : no anterior cervical lymphadenopathy [No Spinal Tenderness] : no spinal tenderness [No CVA Tenderness] : no CVA  tenderness [No Joint Swelling] : no joint swelling [Grossly Normal Strength/Tone] : grossly normal strength/tone [Coordination Grossly Intact] : coordination grossly intact [No Rash] : no rash [No Focal Deficits] : no focal deficits [Normal Gait] : normal gait [Deep Tendon Reflexes (DTR)] : deep tendon reflexes were 2+ and symmetric [Normal Affect] : the affect was normal [Normal Insight/Judgement] : insight and judgment were intact

## 2019-10-01 LAB
ALBUMIN SERPL ELPH-MCNC: 4.4 G/DL
ALP BLD-CCNC: 71 U/L
ALT SERPL-CCNC: 7 U/L
AST SERPL-CCNC: 13 U/L
BILIRUB DIRECT SERPL-MCNC: 0.2 MG/DL
BILIRUB INDIRECT SERPL-MCNC: 0.2 MG/DL
BILIRUB SERPL-MCNC: 0.4 MG/DL
FERRITIN SERPL-MCNC: 160 NG/ML
GGT SERPL-CCNC: 15 U/L
HCV AB SER QL: NONREACTIVE
HCV S/CO RATIO: 0.23 S/CO
INR PPP: 1.21 RATIO
IRON SERPL-MCNC: 71 UG/DL
PROT SERPL-MCNC: 7.5 G/DL
PT BLD: 13.9 SEC

## 2019-10-07 LAB — HBV SURFACE AG SER QL: NONREACTIVE

## 2019-11-04 ENCOUNTER — APPOINTMENT (OUTPATIENT)
Dept: INTERNAL MEDICINE | Facility: CLINIC | Age: 79
End: 2019-11-04
Payer: MEDICARE

## 2019-11-04 VITALS
HEIGHT: 59 IN | DIASTOLIC BLOOD PRESSURE: 75 MMHG | RESPIRATION RATE: 12 BRPM | HEART RATE: 62 BPM | SYSTOLIC BLOOD PRESSURE: 128 MMHG | OXYGEN SATURATION: 91 % | TEMPERATURE: 97.9 F

## 2019-11-04 DIAGNOSIS — H11.31 CONJUNCTIVAL HEMORRHAGE, RIGHT EYE: ICD-10-CM

## 2019-11-04 DIAGNOSIS — J32.9 CHRONIC RHINITIS: ICD-10-CM

## 2019-11-04 DIAGNOSIS — J31.0 CHRONIC RHINITIS: ICD-10-CM

## 2019-11-04 PROCEDURE — 99213 OFFICE O/P EST LOW 20 MIN: CPT

## 2019-11-04 RX ORDER — DICLOXACILLIN SODIUM 500 MG/1
500 CAPSULE ORAL
Qty: 30 | Refills: 0 | Status: DISCONTINUED | COMMUNITY
Start: 2019-07-16

## 2019-11-04 RX ORDER — DOXYCYCLINE 100 MG/1
100 CAPSULE ORAL
Qty: 20 | Refills: 0 | Status: DISCONTINUED | COMMUNITY
Start: 2019-10-21

## 2019-11-04 RX ORDER — BENZONATATE 100 MG/1
100 CAPSULE ORAL
Qty: 20 | Refills: 0 | Status: DISCONTINUED | COMMUNITY
Start: 2019-10-28

## 2019-11-04 RX ORDER — AMOXICILLIN 875 MG/1
875 TABLET, FILM COATED ORAL
Qty: 20 | Refills: 0 | Status: DISCONTINUED | COMMUNITY
Start: 2019-10-12

## 2019-11-04 RX ORDER — FLUTICASONE PROPIONATE 50 UG/1
50 SPRAY, METERED NASAL
Qty: 16 | Refills: 0 | Status: DISCONTINUED | COMMUNITY
Start: 2019-10-28

## 2019-11-04 NOTE — PHYSICAL EXAM
[No Acute Distress] : no acute distress [Well Nourished] : well nourished [Well Developed] : well developed [Well-Appearing] : well-appearing [PERRL] : pupils equal round and reactive to light [EOMI] : extraocular movements intact [Normal Outer Ear/Nose] : the outer ears and nose were normal in appearance [Normal Oropharynx] : the oropharynx was normal [No JVD] : no jugular venous distention [No Lymphadenopathy] : no lymphadenopathy [Supple] : supple [Thyroid Normal, No Nodules] : the thyroid was normal and there were no nodules present [No Respiratory Distress] : no respiratory distress  [No Accessory Muscle Use] : no accessory muscle use [Clear to Auscultation] : lungs were clear to auscultation bilaterally [Normal Rate] : normal rate  [Regular Rhythm] : with a regular rhythm [Normal S1, S2] : normal S1 and S2 [No Murmur] : no murmur heard [No Carotid Bruits] : no carotid bruits [No Abdominal Bruit] : a ~M bruit was not heard ~T in the abdomen [No Varicosities] : no varicosities [Pedal Pulses Present] : the pedal pulses are present [No Edema] : there was no peripheral edema [No Palpable Aorta] : no palpable aorta [No Extremity Clubbing/Cyanosis] : no extremity clubbing/cyanosis [Soft] : abdomen soft [Non Tender] : non-tender [Non-distended] : non-distended [No Masses] : no abdominal mass palpated [No HSM] : no HSM [Normal Bowel Sounds] : normal bowel sounds [Normal Posterior Cervical Nodes] : no posterior cervical lymphadenopathy [Normal Anterior Cervical Nodes] : no anterior cervical lymphadenopathy [No CVA Tenderness] : no CVA  tenderness [No Spinal Tenderness] : no spinal tenderness [No Joint Swelling] : no joint swelling [Grossly Normal Strength/Tone] : grossly normal strength/tone [No Rash] : no rash [Coordination Grossly Intact] : coordination grossly intact [No Focal Deficits] : no focal deficits [Normal Gait] : normal gait [Deep Tendon Reflexes (DTR)] : deep tendon reflexes were 2+ and symmetric [Normal Affect] : the affect was normal [Normal Insight/Judgement] : insight and judgment were intact [de-identified] : RIGHT EYE WITH SUB. HEMORRHAGE [de-identified] : B/L WAX. PHARYNGEAL EXUDATE

## 2019-11-18 ENCOUNTER — APPOINTMENT (OUTPATIENT)
Dept: PULMONOLOGY | Facility: CLINIC | Age: 79
End: 2019-11-18
Payer: MEDICARE

## 2019-11-18 VITALS
WEIGHT: 194 LBS | BODY MASS INDEX: 39.18 KG/M2 | OXYGEN SATURATION: 97 % | TEMPERATURE: 98.3 F | DIASTOLIC BLOOD PRESSURE: 70 MMHG | HEART RATE: 71 BPM | SYSTOLIC BLOOD PRESSURE: 122 MMHG

## 2019-11-18 DIAGNOSIS — T46.2X1A POISONING BY OTHER ANTIDYSRHYTHMIC DRUGS, ACCIDENTAL (UNINTENTIONAL), INITIAL ENCOUNTER: ICD-10-CM

## 2019-11-18 DIAGNOSIS — R05 COUGH: ICD-10-CM

## 2019-11-18 PROCEDURE — 99214 OFFICE O/P EST MOD 30 MIN: CPT

## 2019-11-18 NOTE — REVIEW OF SYSTEMS
[Fever] : no fever [Nasal Congestion] : no nasal congestion [Fatigue] : no fatigue [Chills] : no chills [Epistaxis] : no nosebleeds [Cough] : cough [Hemoptysis] : no hemoptysis [Dyspnea] : no dyspnea [Wheezing] : no wheezing [Pleuritic Pain] : no pleuritic pain [Chest Discomfort] : no chest discomfort [Palpitations] : no palpitations [Edema] : ~T edema was not present [PND] : no PND [Heartburn] : no heartburn [Nasal Discharge] : no nasal discharge [Nocturia] : no nocturia [Anemia] : no anemia [Back Pain] : ~T no back pain [Rash] : no [unfilled] rash [Depression] : no depression [DVT] : no DVT [Snoring] : no snoring

## 2019-11-18 NOTE — DISCUSSION/SUMMARY
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 She was hospitalized in early 2018 at [facility] for an exacerbation of CHF.  \par \par He recent URI has been treated. \par \par HEr ILD has resolved. Her congestive heart failure it is adequately controlled. She was advised to continue other medications as per cardiology.\par \par She is to continue oxygen at night and with exertion as needed. Has portable oxygen. \par \par Follow up in six months.

## 2019-11-18 NOTE — HISTORY OF PRESENT ILLNESS
[FreeTextEntry1] : Has had a "croupy" cough at times. Has taken antibiotics. The cough is dry. Denies any fever or chills. No chest pain or shortness of breath. She uses her oxygen when she is ambulating. At rest she is asymptomatic.

## 2019-11-18 NOTE — PROCEDURE
[FreeTextEntry1] : CT Chest 6/23/17: There were small bilateral pleural effusions. There was cardiomegaly. No evidence of any pulmonary nodules noted.\par \par ABG from  6/14/18: 7.47/42/185 on O2 at 3 LPM. \par \par PFT 2/10/17: There was mild obstruction. There was mild restriction. Diffusion was moderately reduced. No significant change noted after inhalation of bronchodilator.

## 2019-11-18 NOTE — PHYSICAL EXAM
[Normal Appearance] : normal appearance [General Appearance - In No Acute Distress] : no acute distress [Neck Cervical Mass (___cm)] : no neck mass was observed [Heart Sounds] : normal S1 and S2 [Auscultation Breath Sounds / Voice Sounds] : lungs were clear to auscultation bilaterally [Bowel Sounds] : normal bowel sounds [Abnormal Walk] : normal gait [Cyanosis, Localized] : no localized cyanosis [1+ Pitting] : 1+  pitting [Skin Turgor] : normal skin turgor [] : no rash [No Focal Deficits] : no focal deficits [Oriented To Time, Place, And Person] : oriented to person, place, and time [General Appearance - Well Developed] : well developed [Normal Oropharynx] : normal oropharynx [Erythema] : no erythema of the pharynx

## 2019-11-22 ENCOUNTER — APPOINTMENT (OUTPATIENT)
Dept: INTERNAL MEDICINE | Facility: CLINIC | Age: 79
End: 2019-11-22

## 2019-11-23 LAB — RAPID RVP RESULT: NOT DETECTED

## 2020-01-10 ENCOUNTER — APPOINTMENT (OUTPATIENT)
Dept: WOUND CARE | Facility: CLINIC | Age: 80
End: 2020-01-10
Payer: MEDICARE

## 2020-01-10 VITALS
RESPIRATION RATE: 16 BRPM | DIASTOLIC BLOOD PRESSURE: 70 MMHG | HEART RATE: 71 BPM | SYSTOLIC BLOOD PRESSURE: 151 MMHG | TEMPERATURE: 98.2 F

## 2020-01-10 DIAGNOSIS — I87.2 VENOUS INSUFFICIENCY (CHRONIC) (PERIPHERAL): ICD-10-CM

## 2020-01-10 DIAGNOSIS — L97.922 NON-PRESSURE CHRONIC ULCER OF UNSPECIFIED PART OF LEFT LOWER LEG WITH FAT LAYER EXPOSED: ICD-10-CM

## 2020-01-10 DIAGNOSIS — L03.116 CELLULITIS OF LEFT LOWER LIMB: ICD-10-CM

## 2020-01-10 PROCEDURE — 11042 DBRDMT SUBQ TIS 1ST 20SQCM/<: CPT

## 2020-01-10 NOTE — PHYSICAL EXAM
[FreeTextEntry1] : Left leg  [FreeTextEntry2] : 0.9 cm [FreeTextEntry3] : 1.2 cm [TWNoteComboBox1] : Right [FreeTextEntry4] : 0.3 cm [FreeTextEntry5] : curette  [de-identified] : parish  [de-identified] : 2.5% Lidocaine Topical [TWNoteComboBox7] : Leilani [de-identified] : Debridement performed of all devitalized tissue to bleeding viable tissue [de-identified] : Ace wraps

## 2020-01-10 NOTE — ASSESSMENT
[FreeTextEntry1] : Patient to use mupirocin and ace wraps\par full thickness debridement of ulceration left leg with sterile curette down to the level of the subcutaneous tissue\par strongly recommend patient use compressive wraps\par no signs of infection\par patient needs to see vascular physician for evaluation of lymphedema and venous insufficiency both legs\par No signs of erythema no signs of infection\par patient to continue using parish gauze gus and ace wrap eft leg\par patient to follow up with vascular physician since wound is on leg and there is no signs of ulcerations both feet

## 2020-01-21 ENCOUNTER — APPOINTMENT (OUTPATIENT)
Dept: VASCULAR SURGERY | Facility: CLINIC | Age: 80
End: 2020-01-21

## 2020-06-01 ENCOUNTER — APPOINTMENT (OUTPATIENT)
Dept: PULMONOLOGY | Facility: CLINIC | Age: 80
End: 2020-06-01
Payer: MEDICARE

## 2020-06-01 VITALS
DIASTOLIC BLOOD PRESSURE: 68 MMHG | HEART RATE: 64 BPM | WEIGHT: 197 LBS | SYSTOLIC BLOOD PRESSURE: 132 MMHG | BODY MASS INDEX: 39.79 KG/M2 | OXYGEN SATURATION: 97 % | TEMPERATURE: 98.9 F

## 2020-06-01 VITALS — OXYGEN SATURATION: 87 %

## 2020-06-01 DIAGNOSIS — Z11.59 ENCOUNTER FOR SCREENING FOR OTHER VIRAL DISEASES: ICD-10-CM

## 2020-06-01 PROCEDURE — 36415 COLL VENOUS BLD VENIPUNCTURE: CPT

## 2020-06-01 PROCEDURE — 99215 OFFICE O/P EST HI 40 MIN: CPT | Mod: 25

## 2020-06-01 RX ORDER — AMMONIUM LACTATE 12 %
12 CREAM (GRAM) TOPICAL TWICE DAILY
Qty: 1 | Refills: 2 | Status: DISCONTINUED | COMMUNITY
Start: 2018-07-31 | End: 2020-06-01

## 2020-06-01 RX ORDER — SODIUM CHLORIDE 0.65 %
0.65 AEROSOL, SPRAY (ML) NASAL TWICE DAILY
Qty: 1 | Refills: 2 | Status: DISCONTINUED | COMMUNITY
Start: 2019-11-04 | End: 2020-06-01

## 2020-06-01 RX ORDER — TOBRAMYCIN 3 MG/ML
0.3 SOLUTION/ DROPS OPHTHALMIC 4 TIMES DAILY
Qty: 1 | Refills: 0 | Status: DISCONTINUED | COMMUNITY
Start: 2018-11-28 | End: 2020-06-01

## 2020-06-01 RX ORDER — HYDROCORTISONE 1 %
12 CREAM (GRAM) TOPICAL
Qty: 225 | Refills: 0 | Status: DISCONTINUED | COMMUNITY
Start: 2018-03-06 | End: 2020-06-01

## 2020-06-01 RX ORDER — MUPIROCIN 20 MG/G
2 OINTMENT TOPICAL
Qty: 15 | Refills: 2 | Status: DISCONTINUED | COMMUNITY
Start: 2018-03-14 | End: 2020-06-01

## 2020-06-01 NOTE — HISTORY OF PRESENT ILLNESS
[FreeTextEntry1] : The patient came for a follow up visit today. \par \par She was sick in January 2020 with a flu like syndrome. She had a fever then but did not measure it. Her  and daughter were also sick with similar symptoms. She may have had COVID -19. Has not been tested for it. \par \par She is on oxygen. She measures her oxygen at home. \par \par She saw Dr. Vidal on May 20 th. He did not make any changes with her cardiac medications.

## 2020-06-01 NOTE — REVIEW OF SYSTEMS
[Dyspnea] : dyspnea [Difficulty Maintaining Sleep] : difficulty maintaining sleep [Fever] : no fever [Nasal Congestion] : no nasal congestion [Chills] : no chills [Epistaxis] : no nosebleeds [Fatigue] : no fatigue [Cough] : no cough [Palpitations] : no palpitations [PND] : no PND [Chest Discomfort] : no chest discomfort [Wheezing] : no wheezing [Nasal Discharge] : no nasal discharge [Edema] : ~T edema was not present [Heartburn] : no heartburn [Nocturia] : no nocturia [Back Pain] : ~T no back pain [Rash] : no [unfilled] rash [Anemia] : no anemia [Diabetes] : no diabetes mellitus [Depression] : no depression [Headache] : no headache [Difficulty Initiating Sleep] : no difficulty falling asleep [DVT] : no DVT [Thyroid Problem] : no thyroid problem [Snoring] : no snoring

## 2020-06-01 NOTE — PHYSICAL EXAM
[General Appearance - Well Developed] : well developed [Normal Appearance] : normal appearance [General Appearance - In No Acute Distress] : no acute distress [Normal Oropharynx] : normal oropharynx [Neck Cervical Mass (___cm)] : no neck mass was observed [Heart Sounds] : normal S1 and S2 [Auscultation Breath Sounds / Voice Sounds] : lungs were clear to auscultation bilaterally [Bowel Sounds] : normal bowel sounds [Abnormal Walk] : normal gait [Cyanosis, Localized] : no localized cyanosis [1+ Pitting] : 1+  pitting [Skin Turgor] : normal skin turgor [] : no rash [No Focal Deficits] : no focal deficits [Oriented To Time, Place, And Person] : oriented to person, place, and time [Erythema] : no erythema of the pharynx

## 2020-06-01 NOTE — DISCUSSION/SUMMARY
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 Remains on supplemental oxygen. \par \par She is to continue oxygen at night and with exertion as needed. She is unable to carry her portable oxygen tank. Needs to get a portable concentrator so that she can ambulate more easily. \par \par COVID -19 antibody drawn. \par \par Follow up with Dr. Cheney next week. \par \par Follow up in six months.

## 2020-06-04 LAB
SARS-COV-2 IGG SERPL IA-ACNC: <0.1 INDEX
SARS-COV-2 IGG SERPL QL IA: NEGATIVE

## 2020-08-28 NOTE — COUNSELING
[None] : None [Good understanding] : Patient has a good understanding of lifestyle changes and steps needed to achieve self management goal respirations non-labored/good air movement/normal/breath sounds equal/airway patent/clear to auscultation bilaterally

## 2020-09-26 ENCOUNTER — APPOINTMENT (OUTPATIENT)
Dept: CT IMAGING | Facility: IMAGING CENTER | Age: 80
End: 2020-09-26
Payer: MEDICARE

## 2020-09-26 ENCOUNTER — RESULT REVIEW (OUTPATIENT)
Age: 80
End: 2020-09-26

## 2020-09-26 ENCOUNTER — OUTPATIENT (OUTPATIENT)
Dept: OUTPATIENT SERVICES | Facility: HOSPITAL | Age: 80
LOS: 1 days | End: 2020-09-26
Payer: MEDICARE

## 2020-09-26 DIAGNOSIS — I71.4 ABDOMINAL AORTIC ANEURYSM, WITHOUT RUPTURE: ICD-10-CM

## 2020-09-26 DIAGNOSIS — Z98.89 OTHER SPECIFIED POSTPROCEDURAL STATES: Chronic | ICD-10-CM

## 2020-09-26 DIAGNOSIS — Z90.710 ACQUIRED ABSENCE OF BOTH CERVIX AND UTERUS: Chronic | ICD-10-CM

## 2020-09-26 PROCEDURE — 74174 CTA ABD&PLVS W/CONTRAST: CPT | Mod: 26

## 2020-09-26 PROCEDURE — 74174 CTA ABD&PLVS W/CONTRAST: CPT

## 2020-09-26 PROCEDURE — 82565 ASSAY OF CREATININE: CPT

## 2020-10-02 ENCOUNTER — APPOINTMENT (OUTPATIENT)
Dept: VASCULAR SURGERY | Facility: CLINIC | Age: 80
End: 2020-10-02
Payer: MEDICARE

## 2020-10-02 PROCEDURE — 99446 NTRPROF PH1/NTRNET/EHR 5-10: CPT

## 2020-10-02 NOTE — HISTORY OF PRESENT ILLNESS
[FreeTextEntry1] : ILAN JAIMES is a 80 year old female with history of AAA. She is s/p EVAR in 2006 at [facility] with Dr. Turk and multiple revisions for an expanding aneurysmal sac (bilateral iliac artery extensions and hypogastric embolization). Today she reports no new symptoms or complaints. Denies pain in the abdomen/back/legs. Able to walk without claudication. No rest pain or wounds. \par \par PMH:CAD, coronary stent, Afib, HTN, pulmonary fibrosis on home 02, hernia repair, AAA s/p EVAR (2006 with Dr. Turk)\par \par She has been evaluated by Dr. Polanco in the past. Per last IR note, the patient's residual aortic aneurysm sac has been stable with low suspicion for endoleak. Risks/benefits of a possible diagnostic intervention was discussed but deferred for the time being. \par \par Recent CTA from 9/26/2020 noted for a stable 7.4 cm residual AAA sac. "Subtle areas of high density in the posterior aneurysm sac..." Of note, previous CTA from 2019 reported a 7.8 cm residual AAA sac with presence of "contrast opacification of the aneurysm sac, compatible with endoleak, probably arising from retrograde flow of several lumbar arteries."\par \par Recent and previous CTA reports were discussed with Dr. Severino. Given stability of residual size, would not endorse further interventions even in presence of possible leak. \par \par Plan discussed with the patient. Recommend 1 year follow up with repeat CTA abd/pelvis. Patient advised to call our office for earlier appointment if any new/concerning vascular issues arise. \par

## 2020-10-02 NOTE — REASON FOR VISIT
[Home] : at home, [unfilled] , at the time of the visit. [Medical Office: ([facility])___] : at the medical office located in  [Verbal consent obtained from patient] : the patient, [unfilled]

## 2020-12-07 ENCOUNTER — APPOINTMENT (OUTPATIENT)
Dept: PULMONOLOGY | Facility: CLINIC | Age: 80
End: 2020-12-07

## 2020-12-16 PROBLEM — H10.9 CONJUNCTIVITIS, RIGHT EYE: Status: RESOLVED | Noted: 2018-11-28 | Resolved: 2020-12-16

## 2020-12-16 PROBLEM — Z87.09 HISTORY OF ACUTE SINUSITIS: Status: RESOLVED | Noted: 2018-11-27 | Resolved: 2020-12-16

## 2021-02-15 NOTE — DISCHARGE NOTE ADULT - HAS THE PATIENT RECEIVED THE INFLUENZA VACCINE DURING THIS VISIT
No No Residual Tumor Seen Histology Text: There were no malignant cells seen in the sections examined.

## 2021-06-07 ENCOUNTER — APPOINTMENT (OUTPATIENT)
Dept: PULMONOLOGY | Facility: CLINIC | Age: 81
End: 2021-06-07
Payer: MEDICARE

## 2021-06-07 VITALS
BODY MASS INDEX: 40.8 KG/M2 | OXYGEN SATURATION: 91 % | HEART RATE: 67 BPM | SYSTOLIC BLOOD PRESSURE: 140 MMHG | TEMPERATURE: 97.5 F | WEIGHT: 202 LBS | DIASTOLIC BLOOD PRESSURE: 80 MMHG

## 2021-06-07 PROCEDURE — 99213 OFFICE O/P EST LOW 20 MIN: CPT

## 2021-06-07 RX ORDER — FUROSEMIDE 20 MG/1
20 TABLET ORAL
Refills: 0 | Status: COMPLETED | COMMUNITY
End: 2021-06-07

## 2021-06-07 NOTE — REVIEW OF SYSTEMS
[Dyspnea] : dyspnea [Fever] : no fever [Nasal Congestion] : no nasal congestion [Cough] : no cough [Wheezing] : no wheezing [Chest Discomfort] : no chest discomfort [PND] : no PND [Edema] : ~T edema was not present [Palpitations] : no palpitations [Nasal Discharge] : no nasal discharge [Heartburn] : no heartburn [Nocturia] : no nocturia [Back Pain] : ~T no back pain [Rash] : no [unfilled] rash [Anemia] : no anemia [Headache] : no headache [Depression] : no depression [Diabetes] : no diabetes mellitus [Thyroid Problem] : no thyroid problem [DVT] : no DVT [Difficulty Initiating Sleep] : no difficulty falling asleep [Snoring] : no snoring

## 2021-06-07 NOTE — PHYSICAL EXAM
[Normal Appearance] : normal appearance [General Appearance - In No Acute Distress] : no acute distress [Neck Cervical Mass (___cm)] : no neck mass was observed [Heart Sounds] : normal S1 and S2 [Auscultation Breath Sounds / Voice Sounds] : lungs were clear to auscultation bilaterally [Bowel Sounds] : normal bowel sounds [Abnormal Walk] : normal gait [Cyanosis, Localized] : no localized cyanosis [1+ Pitting] : 1+  pitting [Skin Turgor] : normal skin turgor [] : no rash [No Focal Deficits] : no focal deficits [Oriented To Time, Place, And Person] : oriented to person, place, and time

## 2021-06-07 NOTE — PROCEDURE
[FreeTextEntry1] : CT Chest 6/23/17: Small bilateral pleural effusions. There was cardiomegaly. No evidence of any pulmonary nodules noted. No adenopathy. ICD in place. \par \par ABG 6/14/18: 7.47/42/185 on O2 at 3 LPM. \par \par PFT 2/10/17: There was mild obstruction. There was mild restriction. Diffusion was moderately reduced. No significant change noted after inhalation of bronchodilator.

## 2021-06-07 NOTE — DISCUSSION/SUMMARY
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 Remains on supplemental oxygen. \par \par Has difficulty carrying her oxygen tank. Will try to get portable oxygen concentrator. Pulse oximetry was 87 % on room air at rest. Improved to 91 % on 1.5 LPM. \par \par Follow up in six months.

## 2021-06-07 NOTE — HISTORY OF PRESENT ILLNESS
[Former] : former [TextBox_4] : The patient came for a follow up visit today. \par \par She was sick in January 2020 with a flu like syndrome. She feels it was COVID but this was not documented. \par \par She is on oxygen 24 hours per day.  [Awakes Unrefreshed] : does not awaken unrefreshed [Daytime Somnolence] : denies daytime somnolence [Difficulty Initiating Sleep] : does not have difficulty initiating sleep

## 2021-07-13 ENCOUNTER — INPATIENT (INPATIENT)
Facility: HOSPITAL | Age: 81
LOS: 2 days | Discharge: HOME HEALTH | DRG: 291 | End: 2021-07-16
Attending: HOSPITALIST | Admitting: HOSPITALIST
Payer: MEDICARE

## 2021-07-13 VITALS
RESPIRATION RATE: 24 BRPM | WEIGHT: 220.02 LBS | TEMPERATURE: 98 F | DIASTOLIC BLOOD PRESSURE: 84 MMHG | SYSTOLIC BLOOD PRESSURE: 154 MMHG | HEART RATE: 68 BPM | HEIGHT: 60 IN | OXYGEN SATURATION: 95 %

## 2021-07-13 DIAGNOSIS — Z95.810 PRESENCE OF AUTOMATIC (IMPLANTABLE) CARDIAC DEFIBRILLATOR: ICD-10-CM

## 2021-07-13 DIAGNOSIS — J84.10 PULMONARY FIBROSIS, UNSPECIFIED: ICD-10-CM

## 2021-07-13 DIAGNOSIS — I50.9 HEART FAILURE, UNSPECIFIED: ICD-10-CM

## 2021-07-13 DIAGNOSIS — Z00.00 ENCOUNTER FOR GENERAL ADULT MEDICAL EXAMINATION WITHOUT ABNORMAL FINDINGS: ICD-10-CM

## 2021-07-13 DIAGNOSIS — I48.91 UNSPECIFIED ATRIAL FIBRILLATION: ICD-10-CM

## 2021-07-13 DIAGNOSIS — Z90.710 ACQUIRED ABSENCE OF BOTH CERVIX AND UTERUS: Chronic | ICD-10-CM

## 2021-07-13 DIAGNOSIS — J96.02 ACUTE RESPIRATORY FAILURE WITH HYPERCAPNIA: ICD-10-CM

## 2021-07-13 DIAGNOSIS — E87.5 HYPERKALEMIA: ICD-10-CM

## 2021-07-13 DIAGNOSIS — Z95.5 PRESENCE OF CORONARY ANGIOPLASTY IMPLANT AND GRAFT: ICD-10-CM

## 2021-07-13 DIAGNOSIS — Z98.89 OTHER SPECIFIED POSTPROCEDURAL STATES: Chronic | ICD-10-CM

## 2021-07-13 LAB
ALBUMIN SERPL ELPH-MCNC: 4 G/DL — SIGNIFICANT CHANGE UP (ref 3.3–5)
ALP SERPL-CCNC: 67 U/L — SIGNIFICANT CHANGE UP (ref 40–120)
ALT FLD-CCNC: 29 U/L — SIGNIFICANT CHANGE UP (ref 10–45)
ANION GAP SERPL CALC-SCNC: 9 MMOL/L — SIGNIFICANT CHANGE UP (ref 5–17)
APTT BLD: 27.3 SEC — LOW (ref 27.5–35.5)
AST SERPL-CCNC: 26 U/L — SIGNIFICANT CHANGE UP (ref 10–40)
BASE EXCESS BLDA CALC-SCNC: 12.5 MMOL/L — HIGH (ref -2–2)
BASE EXCESS BLDV CALC-SCNC: 7.6 MMOL/L — HIGH (ref -2–2)
BASE EXCESS BLDV CALC-SCNC: 8 MMOL/L — HIGH (ref -2–2)
BASOPHILS # BLD AUTO: 0.03 K/UL — SIGNIFICANT CHANGE UP (ref 0–0.2)
BASOPHILS NFR BLD AUTO: 0.4 % — SIGNIFICANT CHANGE UP (ref 0–2)
BILIRUB SERPL-MCNC: 0.3 MG/DL — SIGNIFICANT CHANGE UP (ref 0.2–1.2)
BUN SERPL-MCNC: 31 MG/DL — HIGH (ref 7–23)
CA-I SERPL-SCNC: 1.21 MMOL/L — SIGNIFICANT CHANGE UP (ref 1.12–1.3)
CA-I SERPL-SCNC: 1.22 MMOL/L — SIGNIFICANT CHANGE UP (ref 1.12–1.3)
CALCIUM SERPL-MCNC: 9 MG/DL — SIGNIFICANT CHANGE UP (ref 8.4–10.5)
CHLORIDE BLDV-SCNC: 102 MMOL/L — SIGNIFICANT CHANGE UP (ref 96–108)
CHLORIDE BLDV-SCNC: 103 MMOL/L — SIGNIFICANT CHANGE UP (ref 96–108)
CHLORIDE SERPL-SCNC: 98 MMOL/L — SIGNIFICANT CHANGE UP (ref 96–108)
CO2 BLDA-SCNC: 43 MMOL/L — HIGH (ref 22–30)
CO2 BLDV-SCNC: 43 MMOL/L — HIGH (ref 22–30)
CO2 BLDV-SCNC: 44 MMOL/L — HIGH (ref 22–30)
CO2 SERPL-SCNC: 29 MMOL/L — SIGNIFICANT CHANGE UP (ref 22–31)
CREAT SERPL-MCNC: 0.76 MG/DL — SIGNIFICANT CHANGE UP (ref 0.5–1.3)
EOSINOPHIL # BLD AUTO: 0.03 K/UL — SIGNIFICANT CHANGE UP (ref 0–0.5)
EOSINOPHIL NFR BLD AUTO: 0.4 % — SIGNIFICANT CHANGE UP (ref 0–6)
GAS PNL BLDA: SIGNIFICANT CHANGE UP
GAS PNL BLDV: 137 MMOL/L — SIGNIFICANT CHANGE UP (ref 135–145)
GAS PNL BLDV: 137 MMOL/L — SIGNIFICANT CHANGE UP (ref 135–145)
GAS PNL BLDV: SIGNIFICANT CHANGE UP
GAS PNL BLDV: SIGNIFICANT CHANGE UP
GLUCOSE BLDV-MCNC: 145 MG/DL — HIGH (ref 70–99)
GLUCOSE BLDV-MCNC: 146 MG/DL — HIGH (ref 70–99)
GLUCOSE SERPL-MCNC: 141 MG/DL — HIGH (ref 70–99)
HCO3 BLDA-SCNC: 40 MMOL/L — HIGH (ref 21–29)
HCO3 BLDV-SCNC: 40 MMOL/L — HIGH (ref 21–29)
HCO3 BLDV-SCNC: 40 MMOL/L — HIGH (ref 21–29)
HCT VFR BLD CALC: 35.3 % — SIGNIFICANT CHANGE UP (ref 34.5–45)
HCT VFR BLDA CALC: 32 % — LOW (ref 39–50)
HCT VFR BLDA CALC: 34 % — LOW (ref 39–50)
HGB BLD CALC-MCNC: 10.5 G/DL — LOW (ref 11.5–15.5)
HGB BLD CALC-MCNC: 11.2 G/DL — LOW (ref 11.5–15.5)
HGB BLD-MCNC: 10.1 G/DL — LOW (ref 11.5–15.5)
IMM GRANULOCYTES NFR BLD AUTO: 1.2 % — SIGNIFICANT CHANGE UP (ref 0–1.5)
INR BLD: 1.1 RATIO — SIGNIFICANT CHANGE UP (ref 0.88–1.16)
LACTATE BLDV-MCNC: 0.7 MMOL/L — SIGNIFICANT CHANGE UP (ref 0.7–2)
LACTATE BLDV-MCNC: 0.8 MMOL/L — SIGNIFICANT CHANGE UP (ref 0.7–2)
LYMPHOCYTES # BLD AUTO: 0.7 K/UL — LOW (ref 1–3.3)
LYMPHOCYTES # BLD AUTO: 8.5 % — LOW (ref 13–44)
MCHC RBC-ENTMCNC: 27.7 PG — SIGNIFICANT CHANGE UP (ref 27–34)
MCHC RBC-ENTMCNC: 28.6 GM/DL — LOW (ref 32–36)
MCV RBC AUTO: 97 FL — SIGNIFICANT CHANGE UP (ref 80–100)
MONOCYTES # BLD AUTO: 0.43 K/UL — SIGNIFICANT CHANGE UP (ref 0–0.9)
MONOCYTES NFR BLD AUTO: 5.3 % — SIGNIFICANT CHANGE UP (ref 2–14)
NEUTROPHILS # BLD AUTO: 6.9 K/UL — SIGNIFICANT CHANGE UP (ref 1.8–7.4)
NEUTROPHILS NFR BLD AUTO: 84.2 % — HIGH (ref 43–77)
NRBC # BLD: 0 /100 WBCS — SIGNIFICANT CHANGE UP (ref 0–0)
NT-PROBNP SERPL-SCNC: 1789 PG/ML — HIGH (ref 0–300)
PCO2 BLDA: 76 MMHG — CRITICAL HIGH (ref 32–46)
PCO2 BLDV: >104 MMHG — HIGH (ref 35–50)
PCO2 BLDV: >104 MMHG — HIGH (ref 35–50)
PH BLDA: 7.34 — LOW (ref 7.35–7.45)
PH BLDV: 7.15 — CRITICAL LOW (ref 7.35–7.45)
PH BLDV: 7.16 — CRITICAL LOW (ref 7.35–7.45)
PLATELET # BLD AUTO: 132 K/UL — LOW (ref 150–400)
PO2 BLDA: 67 MMHG — LOW (ref 74–108)
PO2 BLDV: 172 MMHG — HIGH (ref 25–45)
PO2 BLDV: 38 MMHG — SIGNIFICANT CHANGE UP (ref 25–45)
POTASSIUM BLDV-SCNC: 5 MMOL/L — SIGNIFICANT CHANGE UP (ref 3.5–5.3)
POTASSIUM BLDV-SCNC: 5.1 MMOL/L — SIGNIFICANT CHANGE UP (ref 3.5–5.3)
POTASSIUM SERPL-MCNC: 5.6 MMOL/L — HIGH (ref 3.5–5.3)
POTASSIUM SERPL-SCNC: 5.6 MMOL/L — HIGH (ref 3.5–5.3)
PROT SERPL-MCNC: 7.5 G/DL — SIGNIFICANT CHANGE UP (ref 6–8.3)
PROTHROM AB SERPL-ACNC: 13.1 SEC — SIGNIFICANT CHANGE UP (ref 10.6–13.6)
RBC # BLD: 3.64 M/UL — LOW (ref 3.8–5.2)
RBC # FLD: 14.7 % — HIGH (ref 10.3–14.5)
SAO2 % BLDA: 94 % — SIGNIFICANT CHANGE UP (ref 92–96)
SAO2 % BLDV: 63 % — LOW (ref 67–88)
SAO2 % BLDV: 99 % — HIGH (ref 67–88)
SARS-COV-2 RNA SPEC QL NAA+PROBE: SIGNIFICANT CHANGE UP
SODIUM SERPL-SCNC: 136 MMOL/L — SIGNIFICANT CHANGE UP (ref 135–145)
TROPONIN T, HIGH SENSITIVITY RESULT: 23 NG/L — SIGNIFICANT CHANGE UP (ref 0–51)
TROPONIN T, HIGH SENSITIVITY RESULT: 25 NG/L — SIGNIFICANT CHANGE UP (ref 0–51)
WBC # BLD: 8.19 K/UL — SIGNIFICANT CHANGE UP (ref 3.8–10.5)
WBC # FLD AUTO: 8.19 K/UL — SIGNIFICANT CHANGE UP (ref 3.8–10.5)

## 2021-07-13 PROCEDURE — 93010 ELECTROCARDIOGRAM REPORT: CPT | Mod: GC

## 2021-07-13 PROCEDURE — 99291 CRITICAL CARE FIRST HOUR: CPT | Mod: GC

## 2021-07-13 PROCEDURE — 99292 CRITICAL CARE ADDL 30 MIN: CPT | Mod: GC

## 2021-07-13 PROCEDURE — 99233 SBSQ HOSP IP/OBS HIGH 50: CPT

## 2021-07-13 PROCEDURE — 71045 X-RAY EXAM CHEST 1 VIEW: CPT | Mod: 26

## 2021-07-13 PROCEDURE — 99223 1ST HOSP IP/OBS HIGH 75: CPT | Mod: GC,AI

## 2021-07-13 RX ORDER — NITROGLYCERIN 6.5 MG
0.4 CAPSULE, EXTENDED RELEASE ORAL ONCE
Refills: 0 | Status: DISCONTINUED | OUTPATIENT
Start: 2021-07-13 | End: 2021-07-13

## 2021-07-13 RX ORDER — FUROSEMIDE 40 MG
60 TABLET ORAL ONCE
Refills: 0 | Status: COMPLETED | OUTPATIENT
Start: 2021-07-13 | End: 2021-07-13

## 2021-07-13 RX ORDER — FUROSEMIDE 40 MG
40 TABLET ORAL
Refills: 0 | Status: DISCONTINUED | OUTPATIENT
Start: 2021-07-13 | End: 2021-07-15

## 2021-07-13 RX ORDER — SACUBITRIL AND VALSARTAN 24; 26 MG/1; MG/1
1 TABLET, FILM COATED ORAL
Refills: 0 | Status: DISCONTINUED | OUTPATIENT
Start: 2021-07-13 | End: 2021-07-16

## 2021-07-13 RX ORDER — CALCIUM GLUCONATE 100 MG/ML
2 VIAL (ML) INTRAVENOUS ONCE
Refills: 0 | Status: COMPLETED | OUTPATIENT
Start: 2021-07-13 | End: 2021-07-13

## 2021-07-13 RX ORDER — FUROSEMIDE 40 MG
40 TABLET ORAL ONCE
Refills: 0 | Status: COMPLETED | OUTPATIENT
Start: 2021-07-13 | End: 2021-07-13

## 2021-07-13 RX ORDER — CEFTRIAXONE 500 MG/1
1000 INJECTION, POWDER, FOR SOLUTION INTRAMUSCULAR; INTRAVENOUS ONCE
Refills: 0 | Status: DISCONTINUED | OUTPATIENT
Start: 2021-07-13 | End: 2021-07-13

## 2021-07-13 RX ORDER — FUROSEMIDE 40 MG
20 TABLET ORAL ONCE
Refills: 0 | Status: COMPLETED | OUTPATIENT
Start: 2021-07-13 | End: 2021-07-13

## 2021-07-13 RX ORDER — AZITHROMYCIN 500 MG/1
500 TABLET, FILM COATED ORAL ONCE
Refills: 0 | Status: COMPLETED | OUTPATIENT
Start: 2021-07-13 | End: 2021-07-13

## 2021-07-13 RX ORDER — CEFTRIAXONE 500 MG/1
1000 INJECTION, POWDER, FOR SOLUTION INTRAMUSCULAR; INTRAVENOUS ONCE
Refills: 0 | Status: COMPLETED | OUTPATIENT
Start: 2021-07-13 | End: 2021-07-13

## 2021-07-13 RX ORDER — FUROSEMIDE 40 MG
40 TABLET ORAL DAILY
Refills: 0 | Status: DISCONTINUED | OUTPATIENT
Start: 2021-07-13 | End: 2021-07-13

## 2021-07-13 RX ORDER — APIXABAN 2.5 MG/1
2.5 TABLET, FILM COATED ORAL
Refills: 0 | Status: DISCONTINUED | OUTPATIENT
Start: 2021-07-14 | End: 2021-07-16

## 2021-07-13 RX ORDER — CARVEDILOL PHOSPHATE 80 MG/1
1 CAPSULE, EXTENDED RELEASE ORAL
Qty: 0 | Refills: 0 | DISCHARGE

## 2021-07-13 RX ORDER — SPIRONOLACTONE 25 MG/1
25 TABLET, FILM COATED ORAL DAILY
Refills: 0 | Status: DISCONTINUED | OUTPATIENT
Start: 2021-07-13 | End: 2021-07-13

## 2021-07-13 RX ORDER — RANOLAZINE 500 MG/1
1 TABLET, FILM COATED, EXTENDED RELEASE ORAL
Qty: 0 | Refills: 0 | DISCHARGE

## 2021-07-13 RX ORDER — CARVEDILOL PHOSPHATE 80 MG/1
6.25 CAPSULE, EXTENDED RELEASE ORAL EVERY 12 HOURS
Refills: 0 | Status: DISCONTINUED | OUTPATIENT
Start: 2021-07-13 | End: 2021-07-16

## 2021-07-13 RX ADMIN — CEFTRIAXONE 1000 MILLIGRAM(S): 500 INJECTION, POWDER, FOR SOLUTION INTRAMUSCULAR; INTRAVENOUS at 13:40

## 2021-07-13 RX ADMIN — Medication 20 MILLIGRAM(S): at 10:29

## 2021-07-13 RX ADMIN — Medication 40 MILLIGRAM(S): at 10:56

## 2021-07-13 RX ADMIN — CARVEDILOL PHOSPHATE 6.25 MILLIGRAM(S): 80 CAPSULE, EXTENDED RELEASE ORAL at 22:21

## 2021-07-13 RX ADMIN — AZITHROMYCIN 500 MILLIGRAM(S): 500 TABLET, FILM COATED ORAL at 15:00

## 2021-07-13 RX ADMIN — CEFTRIAXONE 100 MILLIGRAM(S): 500 INJECTION, POWDER, FOR SOLUTION INTRAMUSCULAR; INTRAVENOUS at 13:03

## 2021-07-13 RX ADMIN — Medication 2 GRAM(S): at 13:00

## 2021-07-13 RX ADMIN — AZITHROMYCIN 250 MILLIGRAM(S): 500 TABLET, FILM COATED ORAL at 13:39

## 2021-07-13 RX ADMIN — Medication 200 GRAM(S): at 12:26

## 2021-07-13 NOTE — ED ADULT NURSE NOTE - ED STAT RN HANDOFF DETAILS
hand off given to oncoming RN Alison Avila. Pt awake and alert. Daughter at [facility]. Fall risk precautions maintained. Pt on BIPAP

## 2021-07-13 NOTE — ED ADULT TRIAGE NOTE - DOMESTIC TRAVEL HIGH RISK QUESTION
Warfarin dosing per pharmacist 
 
Elayne Rod is a 69 y.o. male. Height: 5' 10\" (177.8 cm)    Weight: 112.6 kg (248 lb 3.2 oz) Indication:  AVR and afib Goal INR:  2.5 - 3.5 Home dose:  2.5 mg daily Risk factors or significant drug interactions:  Levofloxacin (2/13- 2/20 ), mirtazapine (started 2/13), escitalopram (started 2/19-2/20); amiodarone (dc'd 2/5) Other anticoagulants:  N/A Daily Monitoring Date  INR     Warfarin dose HGB              Notes 2/14  2.4  3 mg  8.7 2/15  2.2  4 mg  9.6 2/16  2.1  5 mg  9.8 2/17  2.1  5 mg  9.5 2/18  2.8  2 mg  9.3 2/19  2.5  2 mg  8.7 
2/20  1.9  3 mg   8.8 
2/21  1.9  4 mg  8.6 A/P:  levaquin and escitalopram stopped and patient's buspirone restarted. INR 1.9 again today. Will 4 mg dose this evening. Daily INRs. Pharmacy will continue to follow. Thank you, Nya Rodarte, Pharm. D. Clinical Pharmacist 
[phone]

## 2021-07-13 NOTE — ED ADULT NURSE NOTE - OBJECTIVE STATEMENT
0915 81 yr old WF brought to ER via ambulance on stretcher for further eval and tx  of worsening SOB. On O2 NC 2lpm at home. Sats were 91% per EMS at home on 2lpm NC. Upon arrival to % NRB intact. Pt tachypneic. Denies chest pain or palp. hx through daughter. PMH Pulmonary Fibrosis, CHF, AICD, COPD, coronary Stents. s/p fall 3 wks ago with no LOC. A&Ox4. Color pink. Skin W&D. Tachypneic. Crackles audible with breath sounds decreased throughout. denies fever or chills. Fall risk precautions maintained 0915 81 yr old WF brought to ER via ambulance on stretcher for further eval and tx  of worsening SOB. On O2 NC 2lpm at home. Sats were 91% per EMS at home on 2lpm NC. Upon arrival to % NRB intact. Pt tachypneic. Denies chest pain or palp. No fever or chills. hx through daughter. PMH Pulmonary Fibrosis, CHF, AICD, COPD, coronary Stents. s/p fall 3 wks ago with no LOC. Injury to right knee at that time with mild swelling visible. Discoloration lower legs at ankles and 2+pedal edema. A&Ox4. looks drowsy Color pink. Skin W&D. Tachypneic. Crackles audible with breath sounds decreased throughout. denies fever or chills. Fall risk precautions maintained

## 2021-07-13 NOTE — H&P ADULT - PROBLEM SELECTOR PLAN 1
- initial pH 7.15 and CO2 > 100 on VBG  - hypercapnia improved on BIPAP, however, patient only pulled tidal volumes of 200-250cc on 12/5, 40% so upgraded to AVAPS with now 400ccs TV  - pH 7.34 with pCO2 76 on evening ABG  - hypercapnia with appropriate metabolic compensation - initial pH 7.15 and CO2 > 100 on VBG  - warm and wet   - hypercapnia improved on BIPAP, however, patient only pulled tidal volumes of 200-250cc on 12/5, 40% so upgraded to AVAPS with now 400ccs TV  - pH 7.34 with pCO2 76 on evening ABG  - hypercapnia with appropriate metabolic compensation  - BIPAP for now, repeat ABG AM with possible transition to non-rebreather tomorrow

## 2021-07-13 NOTE — H&P ADULT - HISTORY OF PRESENT ILLNESS
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 Pt also has increasing SOB 1x week that is associated with abdominal fullness. Pt was on lasix 60mg daily but was decreased to 40mg last month due to "dizziness". Has baseline LE edema, with increased skin discoloration and small wound on RLE after the fall 3x weeks ago. LE not worsened since starting onset of SOB 1 week ago. Denies fever, chils, cough, wheezing, chest pain, N/V/D, abdominal pain, orthopnea. Last hospitalization with similar symptoms in 2018.    ED: afebrile, normotensive, RR 24, 95% on NRB 15L, Azithromycin, ceftriaxone, 2g Calcium gluconate given, Lasix 60mg IV given.      PMHx:   Stented coronary artery x2 RCA 1998  Atrial fibrillation on eliquis  AICD (automatic cardioverter/defibrillator) present  CHF (congestive heart failure), EF 10%   COPD on home 2L NC  Pulmonary fibrosis 2/2 amiodarone      PSHx:   H/O abdominal hysterectomy  H/O hernia repair        Allergies:  amiodarone (Rash)  Augmentin (Rash)  Biaxin (Rash)  Cipro (Rash)  codeine (Rash)  latex (Unknown)  Levaquin (Rash)  morphine (Rash)  statins (Rash)      Home Meds:  carvedilol 12.5 BID  eliquis 2.5 BID  furosemide 40mg, decreased from 60mg 1.5 months ago  spironolactone 25mg       FAMILY HISTORY:  No pertinent family history in first degree relatives                   81 year old F PMH chronic systolic biventricular CHF w/ EF 10% (04/2018), COPD on 2L NC at home, 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 Pt also has increasing SOB 1x week that is associated with abdominal fullness. Pt was on lasix 60mg daily but was decreased to 40mg last month due to "dizziness". Has baseline LE edema, with increased skin discoloration and small wound on RLE after the fall 3x weeks ago. Denies fever, chils, cough, wheezing, chest pain, N/V/D, abdominal pain, orthopnea. Last hospitalization with similar symptoms in 2018.    ED: afebrile, normotensive, RR 24, 95% on NRB 15L, Azithromycin, ceftriaxone, 2g Calcium gluconate given, Lasix 60mg IV given. Pt was initially on BIPAP and then changed to AVAPS.       PMHx:   Stented coronary artery x2 RCA 1998  Atrial fibrillation on eliquis  AICD (automatic cardioverter/defibrillator) present  CHF (congestive heart failure), EF 10%   COPD on home 2L NC  Pulmonary fibrosis 2/2 amiodarone      PSHx:   H/O abdominal hysterectomy  H/O hernia repair        Allergies:  amiodarone (Rash)  Augmentin (Rash)  Biaxin (Rash)  Cipro (Rash)  codeine (Rash)  latex (Unknown)  Levaquin (Rash)  morphine (Rash)  statins (Rash)      Home Meds:  carvedilol 6.5 BID  entresto 24-26  eliquis 2.5 BID  furosemide 40mg, decreased from 60mg 1.5 months ago  spironolactone 25mg   nitroglycerin 0.4 PRN      FAMILY HISTORY:  No pertinent family history in first degree relatives

## 2021-07-13 NOTE — CONSULT NOTE ADULT - SUBJECTIVE AND OBJECTIVE BOX
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 Pt also has worsening SOB x 2 days at rest. Pt was on lasix 60mg daily but was decreased to 40mg last month due to "dizziness". Has baseline LE edema, not worsened since starting onset of SOB. Denies fever, chils, cough, wheezing, chest pain, N/V/D, abdominal pain, orthopnea.    MICU consulted for acute hypercapnic respiratory failure refractory to BIPAP.    ED: afebrile, normotensive, RR 24, 95% on NRB 15L, Azithromycin, ceftriaxone, 2g Calcium gluconate given, Lasix 60mg IV given        PAST MEDICAL & SURGICAL HISTORY:  Stented coronary artery    Atrial fibrillation  Cardiac Arrest  s/p AICD  CHF (congestive heart failure) EF 10% (2018)  Pulmonary fibrosis 2/2 amiodorone toxicity    H/O abdominal hysterectomy  H/O hernia repair        FAMILY HISTORY:  No pertinent family history in first degree relatives        SOCIAL HISTORY:  Smokin.25 packs x 30 years  EtOH Use: None  Marital Status:   Recent Travel: None  Advance Directives: Son is HCP    Allergies    amiodarone (Rash)  Augmentin (Rash)  Biaxin (Rash)  Cipro (Rash)  codeine (Rash)  latex (Unknown)  Levaquin (Rash)  morphine (Rash)  statins (Rash)    Intolerances        HOME MEDICATIONS:    REVIEW OF SYSTEMS:  Constitutional: Fatigue x 3 weeks  Eyes:  ENT:  CV:  Resp: SOB x 2 days  GI:  :  MSK:  Integumentary:  Neurological:  Psychiatric:  Endocrine:  Hematologic/Lymphatic:  Allergic/Immunologic:  [X] All other systems negative  [ ] Unable to assess ROS because ________    OBJECTIVE:  ICU Vital Signs Last 24 Hrs  T(C): 36.9 (2021 09:20), Max: 36.9 (2021 09:20)  T(F): 98.4 (2021 09:20), Max: 98.4 (2021 09:20)  HR: 68 (2021 11:45) (60 - 70)  BP: 127/47 (2021 11:45) (118/67 - 154/84)  BP(mean): --  ABP: --  ABP(mean): --  RR: 26 (2021 11:45) (24 - 35)  SpO2: 99% (2021 11:45) (92% - 100%)         @ 07:01  -   @ 13:35  --------------------------------------------------------  IN: 0 mL / OUT: 350 mL / NET: -350 mL      CAPILLARY BLOOD GLUCOSE          PHYSICAL EXAM:      GENERAL: Laying comfortably, NAD on BIPAP  EYES: EOMI, PERRL, no scleral icterus  NECK: No JVD  LUNG: Decreased respiratory effort; No wheeze, crackles or rhonci  HEART: Regular rate and rhythm; No murmurs, rubs, or gallops  ABDOMEN: Soft, Nontender, Nondistended  EXTREMITIES:  RLE hematoma and TTP, RLE 1+ pitting edema. Peripheral Pulses intact, No clubbing, cyanosis, or edema  PSYCH: AAOx4  NEUROLOGY: non-focal, strength 5/5 in all extremities, sensation intact  SKIN: RLE hematoma, LLE purpura      HOSPITAL MEDICATIONS:  MEDICATIONS  (STANDING):  azithromycin  IVPB 500 milliGRAM(s) IV Intermittent once    MEDICATIONS  (PRN):      LABS:                        10.1   8.19  )-----------( 132      ( 2021 09:59 )             35.3     07    136  |  98  |  31<H>  ----------------------------<  141<H>  5.6<H>   |  29  |  0.76    Ca    9.0      2021 09:59    TPro  7.5  /  Alb  4.0  /  TBili  0.3  /  DBili  x   /  AST  26  /  ALT  29  /  AlkPhos  67  07-13    PT/INR - ( 2021 11:51 )   PT: 13.1 sec;   INR: 1.10 ratio         PTT - ( 2021 11:51 )  PTT:27.3 sec      Venous Blood Gas:   @ 11:42  7.16/>104/38/40/63  VBG Lactate: 0.8  Venous Blood Gas:   @ 09:58  7.15/>104/172/40/99  VBG Lactate: 0.7      MICROBIOLOGY:     RADIOLOGY:  [X ] Reviewed and interpreted by me    EKG: , , TWI II, III, avF

## 2021-07-13 NOTE — ED PROVIDER NOTE - OBJECTIVE STATEMENT
81 year old F PMH 81 year old F PMH chronic systolic biventricular CHF w/ EF 10%, COPD, chronic pulmonary fibrosis, CAD w/ stent, afib on eliquis w/ AICD p/w progressive weakness x3 weeks after fall from standing on R side, no head trauma, no LOC. On eliquis. Trauma to knee w/bruising. Now ambulating with walker, doesn't normally. Cannot bear weight well on it. SOB x4 days, worsened since Sunday, at rest and w/ ambulation. On 2L home oxygen, BIBEMS at 91% put on NRB with improvement of saturation. Has chronic baseline LE edema, not worsened since onset of SOB. Denies CP, cough, fevers, abdominal pain, wheezing. 81 year old F PMH chronic systolic biventricular CHF w/ EF 10%, COPD, chronic pulmonary fibrosis, CAD w/ stent, afib on eliquis w/ AICD p/w progressive weakness x3 weeks after fall from standing on R side, no head trauma, no LOC. On eliquis. Trauma to knee w/bruising. Now ambulating with walker, doesn't normally. Cannot bear weight well on it. SOB x4 days, worsened since Sunday, at rest and w/ ambulation. On 2L home oxygen, BIBEMS at 91% put on NRB en route with improvement of saturation. Has chronic baseline LE edema, not worsened since onset of SOB. Denies CP, cough, fevers, abdominal pain, wheezing.

## 2021-07-13 NOTE — H&P ADULT - NSHPREVIEWOFSYSTEMS_GEN_ALL_CORE
REVIEW OF SYSTEMS:  CONSTITUTIONAL: No weakness, fevers or chills  EYES/ENT: No visual changes;  No dysphagia  NECK: No pain or stiffness  RESPIRATORY: Shortness of breath for 1 week; No cough, wheezing, hemoptysis;   CARDIOVASCULAR: Lower extremity edema, No chest pain or palpitations;   GASTROINTESTINAL: No abdominal or epigastric pain. No nausea, vomiting, or hematemesis; No diarrhea or constipation. No melena or hematochezia.  BACK: No back pain  GENITOURINARY: No dysuria, frequency or hematuria  NEUROLOGICAL: No numbness or weakness  SKIN: Small healing wound on medial malleolus with leg discoloration;  No itching, burning, or lesions   All other review of systems is negative unless indicated above.

## 2021-07-13 NOTE — H&P ADULT - ATTENDING COMMENTS
Acute hypercarbic respiratory failure from acute on chronic systolic heart failure exacerbation,  Improving with BiPAP, continue overnight  Diurese with Lasix 40 mg IVP BID

## 2021-07-13 NOTE — ED PROVIDER NOTE - PHYSICAL EXAMINATION
GENERAL: Awake, alert, speaking in short sentences  HEENT: NC/AT, moist mucous membranes, PERRL, EOMI  LUNGS: Tachypneic, poor airflow, BS diminished at bases, no wheezes or rhonchi  CARDIAC: RRR, no m/r/g  ABDOMEN: Soft, normal BS, non tender, non distended, no rebound, no guarding  BACK: No midline spinal tenderness, no CVA tenderness  EXT: 2+ pitting edema LE, no calf tenderness, 2+ DP pulses bilaterally, no deformities.  NEURO: A&Ox3. Moving all extremities.  SKIN: Warm and dry. No rash. Ecchymoses R knee.   PSYCH: Normal affect.

## 2021-07-13 NOTE — H&P ADULT - NSHPSOCIALHISTORY_GEN_ALL_CORE
Lives with  and disabled daughter. Has 2 other daughters who are supportive and involved in her care.

## 2021-07-13 NOTE — CONSULT NOTE ADULT - ATTENDING COMMENTS
Patient is seen and examined. Case discussed with team.     80 yo woman with CHF, COPD admitted with acute on chronic hypercapnic respiratory failure in the setting of likely volume overload. On my evaluation, patient is awake and alert. She is not getting adequate volumes on BiPap and switched to AVAPs with immediate improvement in ventilation. GOC extensively discussed with daughter and patient at bedside. Patient is DNR/DNI.   -- agree with diuresis  -- consider holding Abx for now - I don't see clear e/o infection  -- repeat ABG about 1 hour after starting NIV  Not MICU candidate at this time. Please reconsult as needed

## 2021-07-13 NOTE — H&P ADULT - NSHPLABSRESULTS_GEN_ALL_CORE
Cardiovascular Diagnostic Testing:    ECG: Personally reviewed:    CXR: Personally reviewed    Labs: Personally reviewed                        10.1   8.19  )-----------( 132      ( 13 Jul 2021 09:59 )             35.3     07-13    136  |  98  |  31<H>  ----------------------------<  141<H>  5.6<H>   |  29  |  0.76    Ca    9.0      13 Jul 2021 09:59    TPro  7.5  /  Alb  4.0  /  TBili  0.3  /  DBili  x   /  AST  26  /  ALT  29  /  AlkPhos  67  07-13    PT/INR - ( 13 Jul 2021 11:51 )   PT: 13.1 sec;   INR: 1.10 ratio         PTT - ( 13 Jul 2021 11:51 )  PTT:27.3 sec    CARDIAC MARKERS ( 13 Jul 2021 11:52 )  25 ng/L / x     / x     / x     / x     / x      CARDIAC MARKERS ( 13 Jul 2021 09:59 )  23 ng/L / x     / x     / x     / x     / x            Serum Pro-Brain Natriuretic Peptide: 1789 pg/mL (07-13 @ 09:59) Cardiovascular Diagnostic Testing:    ECG: Personally reviewed: George,    CXR: Personally reviewed: enlarged heart borders, pulm vascular congestion, cannot r/o b/l small PE    Labs: Personally reviewed     mariauriel VBG pH 7.15, CO2 >100  evening ABG pH 7.34 with pCO2 76 on evening                        10.1   8.19  )-----------( 132      ( 13 Jul 2021 09:59 )             35.3     07-13    136  |  98  |  31<H>  ----------------------------<  141<H>  5.6<H>   |  29  |  0.76    Ca    9.0      13 Jul 2021 09:59    TPro  7.5  /  Alb  4.0  /  TBili  0.3  /  DBili  x   /  AST  26  /  ALT  29  /  AlkPhos  67  07-13    PT/INR - ( 13 Jul 2021 11:51 )   PT: 13.1 sec;   INR: 1.10 ratio         PTT - ( 13 Jul 2021 11:51 )  PTT:27.3 sec    CARDIAC MARKERS ( 13 Jul 2021 11:52 )  25 ng/L / x     / x     / x     / x     / x      CARDIAC MARKERS ( 13 Jul 2021 09:59 )  23 ng/L / x     / x     / x     / x     / x            Serum Pro-Brain Natriuretic Peptide: 1789 pg/mL (07-13 @ 09:59) Cardiovascular Diagnostic Testing:    ECG: Personally reviewed: Ventricularly paced beats     CXR: Personally reviewed: enlarged heart borders, pulm vascular congestion, cannot r/o b/l small PE    Labs: Personally reviewed     rubén VBG pH 7.15, CO2 >100  evening ABG pH 7.34 with pCO2 76 on evening                        10.1   8.19  )-----------( 132      ( 13 Jul 2021 09:59 )             35.3     07-13    136  |  98  |  31<H>  ----------------------------<  141<H>  5.6<H>   |  29  |  0.76    Ca    9.0      13 Jul 2021 09:59    TPro  7.5  /  Alb  4.0  /  TBili  0.3  /  DBili  x   /  AST  26  /  ALT  29  /  AlkPhos  67  07-13    PT/INR - ( 13 Jul 2021 11:51 )   PT: 13.1 sec;   INR: 1.10 ratio         PTT - ( 13 Jul 2021 11:51 )  PTT:27.3 sec    CARDIAC MARKERS ( 13 Jul 2021 11:52 )  25 ng/L / x     / x     / x     / x     / x      CARDIAC MARKERS ( 13 Jul 2021 09:59 )  23 ng/L / x     / x     / x     / x     / x            Serum Pro-Brain Natriuretic Peptide: 1789 pg/mL (07-13 @ 09:59)

## 2021-07-13 NOTE — H&P ADULT - PROBLEM SELECTOR PLAN 2
- CHF w/ EF 10% (2018) w/ AICD   - s/p 60mg IV lasix ED  - c/w home carvedilol, entresto, spironolactone  - increased lasix to 40mg BID - CHF w/ EF 10% (2018) w/ AICD   - s/p 60mg IV lasix ED  - c/w home carvedilol, entresto, spironolactone  - increased lasix to 40mg BID, descalade as needed   - strict IN/OUTs - CHF w/ EF 10% (2018) w/ AICD   - s/p 60mg IV lasix ED  - c/w home carvedilol, entresto, hold spironolactone  - increased lasix to 40mg BID, descalade as needed   - strict IN/OUTs

## 2021-07-13 NOTE — H&P ADULT - ASSESSMENT
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

## 2021-07-13 NOTE — H&P ADULT - PROBLEM SELECTOR PLAN 3
- nii CERONfib  - c/w eliquis 2.5 - nii [email or web address]  - AURELIO-VASC 5, needs AC  - c/w eliquis 2.5 BID  - telemetry

## 2021-07-13 NOTE — ED PROVIDER NOTE - PROGRESS NOTE DETAILS
ap- gas noted, pt is coversational oriented to self, she is accompanied w/ her daughter, she reports she is here for SOB, discussion w/ daughter and pt regarding code status, states they would want trial of intubation, Pt is on bipap, received lasix; meds from pharmacy pt is on lasix 60 mg (daughter initially thought pt was on 20 mg of lasix) repeat labs reviewed, pt w/ persistent hypercapnia, likely 2/2 pleural effusion ?parapneumonia effusion, will give coverage for pneumonia and s/p 60 of lasix w/ minimal outpt pt found to be hyperK will redose w repeated lasix at 60 mg Devorah Evans PGY-2: Patient notes allergic reaction (hives) to ciprofloaxacin, augmentin, levaquin. Discussed possibility of a pneumonia on XR with patient and daughter. Explained hives does not necessarily mean anaphylaxis. Patient agreeable to ceftriaxone at this time, discussed we will monitor closely. UOP now ~400. Devoarh Evans PGY-2: Hospitalist paged.

## 2021-07-13 NOTE — H&P ADULT - NSHPPHYSICALEXAM_GEN_ALL_CORE
Physical Exam:  T(F): 98.4 (07-13), Max: 98.4 (07-13)  HR: 66 (07-13) (57 - 70)  BP: 124/58 (07-13) (114/45 - 154/84)  RR: 22 (07-13)  SpO2: 100% (07-13)  GENERAL: No acute distress, well-developed  HEAD:  Atraumatic, Normocephalic  ENT: EOMI, PERRLA, conjunctiva and sclera clear, Neck supple, No JVD, moist mucosa  CHEST/LUNG: Clear to ausculation b/l, no wheezing, rales, or rhonchi  BACK: No spinal tenderness  HEART: Regular rate and rhythm; No murmurs, rubs, or gallops  ABDOMEN: Obese, Soft, Nontender, Nondistended; Bowel sounds present  EXTREMITIES:  2+ pitting edema of both extremities, right extremity markedly more erythematous with a healing small would near the medial malleolus. DP pulses intact.  PSYCH: Nl behavior, nl affect  NEUROLOGY: AAOx3, non-focal, cranial nerves intact  SKIN: Normal color, No rashes or lesions  LINES: 2 peripheral lines b/l

## 2021-07-13 NOTE — ED ADULT NURSE NOTE - NSIMPLEMENTINTERV_GEN_ALL_ED
Implemented All Fall with Harm Risk Interventions:  [place] to call system. Call bell, personal items and telephone within reach. Instruct patient to call for assistance. Room bathroom lighting operational. Non-slip footwear when patient is off stretcher. Physically safe environment: no spills, clutter or unnecessary equipment. Stretcher in lowest position, wheels locked, appropriate side rails in place. Provide visual cue, wrist band, yellow gown, etc. Monitor gait and stability. Monitor for mental status changes and reorient to person, place, and time. Review medications for side effects contributing to fall risk. Reinforce activity limits and safety measures with patient and family. Provide visual clues: red socks.

## 2021-07-13 NOTE — ED ADULT TRIAGE NOTE - CHIEF COMPLAINT QUOTE
worsening SOB x3 days. Hx of COPD and CHF. On 2 L NC baseline, 91% per EMS upon arrival, placed on 15 L NRB mask

## 2021-07-13 NOTE — CONSULT NOTE ADULT - ASSESSMENT
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 MICU consulted for acute hypercapnic respiratory failure with pH 7.15 and CO2 > 100 on VBG i/s/o decreased mentation. At the time of my exam, daughter is at bedside and states patient has significantly improved. Patient is currently AOx4, not endorsing any SOB.    #Acute Hypercapnic Respiratory Failure  VBG: pH 7.15 and CO2 >100  --mental status significantly improved, hypercapnia likely improving due to BIPAP (12/5, Fio2 40%, tidal volumes of 250s)  --repeat ABG in 1 hour to assess for hypercapnia, expect improvement in CO2 given improvement in mental status    #GOC  Patient stated that she wishes to be DNI, however, would like to be resuscitated in the event of cardiac arrest. Explained to patient and daughter that cardiac resuscitation requires intubation, will discuss further GOC with attending.    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 MICU consulted for acute hypercapnic respiratory failure with pH 7.15 and CO2 > 100 on VBG i/s/o decreased mentation. At the time of my exam, daughter is at bedside and states patient has significantly improved. Patient is currently AOx4, not endorsing any SOB.    #Acute on Chronic Hypercapnic Respiratory Failure  VBG: pH 7.15 and CO2 >100  --mental status significantly improved, hypercapnia likely improving due to BIPAP, however, patient only pulling tidal volumes of 200-250cc on 12/5, 40%  --change to AVAPS 400cc volume,   --repeat ABG in 1 hour to assess for hypercapnia, expect improvement in CO2 given improvement in mental status    #GOC  Patient stated that she wishes to be DNI, however, would like to be resuscitated in the event of cardiac arrest. Explained to patient and daughter that cardiac resuscitation requires intubation, will discuss further GOC with attending.    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 MICU consulted for acute hypercapnic respiratory failure with pH 7.15 and CO2 > 100 on VBG i/s/o decreased mentation. At the time of my exam, daughter is at bedside and states patient has significantly improved. Patient is currently AOx4, not endorsing any SOB.    #Acute on Chronic Hypercapnic Respiratory Failure  VBG: pH 7.15 and CO2 >100  --mental status significantly improved, hypercapnia likely improving due to BIPAP, however, patient only pulling tidal volumes of 200-250cc on 12/5, 40%  --change to AVAPS 400cc TV, now patient obtaining much better tidal volumes of 350-400cc  --repeat ABG in 30 minutes - 1 hour to assess for hypercapnia, expect improvement in CO2 given improvement in mental status    #GOC  Patient stated that she wishes to be DNR/DNI    Patient is not a MICU candidate at this time, please reconsult if patient's CO2 increases on VBG/ABG or respiratory status worsens. 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 MICU consulted for acute hypercapnic respiratory failure with pH 7.15 and CO2 > 100 on VBG i/s/o decreased mentation. At the time of my exam, daughter is at bedside and states patient has significantly improved. Patient is currently AOx4, not endorsing any SOB.    #Acute on Chronic Hypercapnic Respiratory Failure  VBG: pH 7.15 and CO2 >100  --mental status significantly improved, hypercapnia likely improving due to BIPAP, however, patient only pulling tidal volumes of 200-250cc on 12/5, 40%  --change to AVAPS 400cc TV, now patient obtaining much better tidal volumes of 350-400cc  --repeat ABG in 30 minutes - 1 hour to assess for hypercapnia, expect improvement in CO2 given improvement in mental status    #GOC  Patient stated that she wishes to be DNR/DNI, notified primary team to fill out MOLST form    Patient is not a MICU candidate at this time.

## 2021-07-13 NOTE — H&P ADULT - PROBLEM SELECTOR PLAN 6
Diet- low sodium diet  Dispo- home  DVT- eliquis 2.5BID Diet- NPO, DASH when off bipap   Dispo- home  DVT- eliquis 2.5BID  GOC- DNR/DNI Diet- NPO, DASH when off bipap   Dispo- home  DVT- eliquis 2.5BID  GOC- DNR/DNI as per documentation from micu consult; clinically stable at this time and will readdress goals of care with family in am

## 2021-07-13 NOTE — ED PROVIDER NOTE - CLINICAL SUMMARY MEDICAL DECISION MAKING FREE TEXT BOX
ap- 81 F w/ hx of CHF, presents to the ER w/ sob started 3 days ago, edelmira ap- 81 F w/ hx of CHF, presents to the ER w/ sob started 3 days ago, bipa    Devorah Evans PGY-2: 81 year old F PMH chronic systolic biventricular CHF w/ EF 10%, COPD, chronic pulmonary fibrosis, CAD w/ stent, afib on eliquis w/ AICD p/w worsening SOB x3 days. BIBEMS 91% on NRB. Tachypneic to 30s. No CP. Poor airflow, diminished BS bilaterally. +bilateral B lines on PUCS w/ biventricular enlargement. Placed on BiPaP. EKG w/ inferior T wave inversions. DDx ACS vs CHF exacerbation. Will get cardiac labs (BNP, troponin), VBG, basics, CXR. MICU consult. Admit.

## 2021-07-13 NOTE — ED PROVIDER NOTE - ATTENDING CONTRIBUTION TO CARE
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 pt states full code, has had prior cardiac arrest w/ subsequent aicd placement in 1998. Pt w/ tahcypnea sating 91 percent on NRB, upon arrival w/ wrosening respiratory status, placed on bipap given iv lasix concern for chf exabceration while in the er persistent hypercapnia, bipap w/ poor volumes, plan to give antibiotics possible pnuemonia w/ a parapnuemonic effusion vs primailry 2/2 chf, micu consulted for assistance w/ bipap transitioned to avaps, pt w/ improvement in mental state abg obtained micu declining pt, family/pt unsure of code status  given lasix w/ approx 400 cc of UOP and additional on cristina

## 2021-07-13 NOTE — H&P ADULT - PROBLEM SELECTOR PLAN 4
- 5.6 in the ED, likely due to metabolic acidosis   - s/p calcium gluconate  - repeat BMP tonight  - BMP tomorrow - 5.6 in the ED, likely due to metabolic acidosis   - s/p calcium gluconate  - c/w lasix  - repeat BMP tonight  - BMP tomorrow

## 2021-07-13 NOTE — ED PROVIDER NOTE - NS ED ROS FT
CONST: no fevers, no chills, +generalized weakness  EYES: no pain, no vision changes  ENT: no sore throat, no ear pain, no change in hearing  CV: no chest pain, + leg swelling  RESP: + shortness of breath, no cough  ABD: no abdominal pain, no nausea, no vomiting, no diarrhea  : no dysuria, no flank pain, no hematuria  MSK: no back pain, no extremity pain  NEURO: no headache or additional neurologic complaints  HEME: no easy bleeding  SKIN:  no rash

## 2021-07-14 ENCOUNTER — TRANSCRIPTION ENCOUNTER (OUTPATIENT)
Age: 81
End: 2021-07-14

## 2021-07-14 LAB
ANION GAP SERPL CALC-SCNC: 10 MMOL/L — SIGNIFICANT CHANGE UP (ref 5–17)
ANION GAP SERPL CALC-SCNC: 13 MMOL/L — SIGNIFICANT CHANGE UP (ref 5–17)
ANION GAP SERPL CALC-SCNC: 7 MMOL/L — SIGNIFICANT CHANGE UP (ref 5–17)
BASE EXCESS BLDV CALC-SCNC: 12.9 MMOL/L — HIGH (ref -2–2)
BUN SERPL-MCNC: 24 MG/DL — HIGH (ref 7–23)
BUN SERPL-MCNC: 25 MG/DL — HIGH (ref 7–23)
BUN SERPL-MCNC: 26 MG/DL — HIGH (ref 7–23)
CA-I SERPL-SCNC: 1.23 MMOL/L — SIGNIFICANT CHANGE UP (ref 1.12–1.3)
CALCIUM SERPL-MCNC: 9.3 MG/DL — SIGNIFICANT CHANGE UP (ref 8.4–10.5)
CALCIUM SERPL-MCNC: 9.3 MG/DL — SIGNIFICANT CHANGE UP (ref 8.4–10.5)
CALCIUM SERPL-MCNC: 9.5 MG/DL — SIGNIFICANT CHANGE UP (ref 8.4–10.5)
CHLORIDE BLDV-SCNC: 97 MMOL/L — SIGNIFICANT CHANGE UP (ref 96–108)
CHLORIDE SERPL-SCNC: 95 MMOL/L — LOW (ref 96–108)
CHLORIDE SERPL-SCNC: 99 MMOL/L — SIGNIFICANT CHANGE UP (ref 96–108)
CHLORIDE SERPL-SCNC: 99 MMOL/L — SIGNIFICANT CHANGE UP (ref 96–108)
CO2 BLDV-SCNC: 44 MMOL/L — HIGH (ref 22–30)
CO2 SERPL-SCNC: 31 MMOL/L — SIGNIFICANT CHANGE UP (ref 22–31)
CO2 SERPL-SCNC: 33 MMOL/L — HIGH (ref 22–31)
CO2 SERPL-SCNC: 37 MMOL/L — HIGH (ref 22–31)
COVID-19 SPIKE DOMAIN AB INTERP: POSITIVE
COVID-19 SPIKE DOMAIN ANTIBODY RESULT: >250 U/ML — HIGH
CREAT SERPL-MCNC: 0.76 MG/DL — SIGNIFICANT CHANGE UP (ref 0.5–1.3)
CREAT SERPL-MCNC: 0.77 MG/DL — SIGNIFICANT CHANGE UP (ref 0.5–1.3)
CREAT SERPL-MCNC: 0.77 MG/DL — SIGNIFICANT CHANGE UP (ref 0.5–1.3)
GAS PNL BLDA: SIGNIFICANT CHANGE UP
GAS PNL BLDV: 144 MMOL/L — SIGNIFICANT CHANGE UP (ref 135–145)
GAS PNL BLDV: SIGNIFICANT CHANGE UP
GAS PNL BLDV: SIGNIFICANT CHANGE UP
GLUCOSE BLDV-MCNC: 83 MG/DL — SIGNIFICANT CHANGE UP (ref 70–99)
GLUCOSE SERPL-MCNC: 141 MG/DL — HIGH (ref 70–99)
GLUCOSE SERPL-MCNC: 84 MG/DL — SIGNIFICANT CHANGE UP (ref 70–99)
GLUCOSE SERPL-MCNC: 92 MG/DL — SIGNIFICANT CHANGE UP (ref 70–99)
HCO3 BLDV-SCNC: 41 MMOL/L — HIGH (ref 21–29)
HCT VFR BLD CALC: 33.6 % — LOW (ref 34.5–45)
HCT VFR BLDA CALC: 31 % — LOW (ref 39–50)
HGB BLD CALC-MCNC: 10.1 G/DL — LOW (ref 11.5–15.5)
HGB BLD-MCNC: 9.5 G/DL — LOW (ref 11.5–15.5)
LACTATE BLDV-MCNC: 1 MMOL/L — SIGNIFICANT CHANGE UP (ref 0.7–2)
MAGNESIUM SERPL-MCNC: 2.1 MG/DL — SIGNIFICANT CHANGE UP (ref 1.6–2.6)
MAGNESIUM SERPL-MCNC: 2.2 MG/DL — SIGNIFICANT CHANGE UP (ref 1.6–2.6)
MAGNESIUM SERPL-MCNC: 2.3 MG/DL — SIGNIFICANT CHANGE UP (ref 1.6–2.6)
MCHC RBC-ENTMCNC: 26.8 PG — LOW (ref 27–34)
MCHC RBC-ENTMCNC: 28.3 GM/DL — LOW (ref 32–36)
MCV RBC AUTO: 94.6 FL — SIGNIFICANT CHANGE UP (ref 80–100)
NRBC # BLD: 0 /100 WBCS — SIGNIFICANT CHANGE UP (ref 0–0)
OTHER CELLS CSF MANUAL: 8 ML/DL — LOW (ref 18–22)
PCO2 BLDV: 82 MMHG — HIGH (ref 35–50)
PH BLDV: 7.32 — LOW (ref 7.35–7.45)
PHOSPHATE SERPL-MCNC: 2.4 MG/DL — LOW (ref 2.5–4.5)
PHOSPHATE SERPL-MCNC: 2.6 MG/DL — SIGNIFICANT CHANGE UP (ref 2.5–4.5)
PHOSPHATE SERPL-MCNC: 2.6 MG/DL — SIGNIFICANT CHANGE UP (ref 2.5–4.5)
PLATELET # BLD AUTO: 110 K/UL — LOW (ref 150–400)
PO2 BLDV: 35 MMHG — SIGNIFICANT CHANGE UP (ref 25–45)
POTASSIUM BLDV-SCNC: 4.2 MMOL/L — SIGNIFICANT CHANGE UP (ref 3.5–5.3)
POTASSIUM SERPL-MCNC: 4 MMOL/L — SIGNIFICANT CHANGE UP (ref 3.5–5.3)
POTASSIUM SERPL-MCNC: 4.3 MMOL/L — SIGNIFICANT CHANGE UP (ref 3.5–5.3)
POTASSIUM SERPL-MCNC: 4.5 MMOL/L — SIGNIFICANT CHANGE UP (ref 3.5–5.3)
POTASSIUM SERPL-SCNC: 4 MMOL/L — SIGNIFICANT CHANGE UP (ref 3.5–5.3)
POTASSIUM SERPL-SCNC: 4.3 MMOL/L — SIGNIFICANT CHANGE UP (ref 3.5–5.3)
POTASSIUM SERPL-SCNC: 4.5 MMOL/L — SIGNIFICANT CHANGE UP (ref 3.5–5.3)
RBC # BLD: 3.55 M/UL — LOW (ref 3.8–5.2)
RBC # FLD: 14.6 % — HIGH (ref 10.3–14.5)
SAO2 % BLDV: 62 % — LOW (ref 67–88)
SARS-COV-2 IGG+IGM SERPL QL IA: >250 U/ML — HIGH
SARS-COV-2 IGG+IGM SERPL QL IA: POSITIVE
SODIUM SERPL-SCNC: 139 MMOL/L — SIGNIFICANT CHANGE UP (ref 135–145)
SODIUM SERPL-SCNC: 142 MMOL/L — SIGNIFICANT CHANGE UP (ref 135–145)
SODIUM SERPL-SCNC: 143 MMOL/L — SIGNIFICANT CHANGE UP (ref 135–145)
WBC # BLD: 5.31 K/UL — SIGNIFICANT CHANGE UP (ref 3.8–10.5)
WBC # FLD AUTO: 5.31 K/UL — SIGNIFICANT CHANGE UP (ref 3.8–10.5)

## 2021-07-14 PROCEDURE — 99233 SBSQ HOSP IP/OBS HIGH 50: CPT | Mod: GC

## 2021-07-14 PROCEDURE — 93306 TTE W/DOPPLER COMPLETE: CPT | Mod: 26

## 2021-07-14 RX ORDER — SPIRONOLACTONE 25 MG/1
25 TABLET, FILM COATED ORAL DAILY
Refills: 0 | Status: DISCONTINUED | OUTPATIENT
Start: 2021-07-14 | End: 2021-07-16

## 2021-07-14 RX ADMIN — CARVEDILOL PHOSPHATE 6.25 MILLIGRAM(S): 80 CAPSULE, EXTENDED RELEASE ORAL at 17:10

## 2021-07-14 RX ADMIN — APIXABAN 2.5 MILLIGRAM(S): 2.5 TABLET, FILM COATED ORAL at 17:10

## 2021-07-14 RX ADMIN — CARVEDILOL PHOSPHATE 6.25 MILLIGRAM(S): 80 CAPSULE, EXTENDED RELEASE ORAL at 05:04

## 2021-07-14 RX ADMIN — Medication 40 MILLIGRAM(S): at 17:10

## 2021-07-14 RX ADMIN — APIXABAN 2.5 MILLIGRAM(S): 2.5 TABLET, FILM COATED ORAL at 05:04

## 2021-07-14 RX ADMIN — SACUBITRIL AND VALSARTAN 1 TABLET(S): 24; 26 TABLET, FILM COATED ORAL at 17:10

## 2021-07-14 RX ADMIN — Medication 40 MILLIGRAM(S): at 05:04

## 2021-07-14 RX ADMIN — SACUBITRIL AND VALSARTAN 1 TABLET(S): 24; 26 TABLET, FILM COATED ORAL at 05:03

## 2021-07-14 NOTE — CONSULT NOTE ADULT - SUBJECTIVE AND OBJECTIVE BOX
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 Pt also has increasing SOB 1x week that is associated with abdominal fullness. Pt was on lasix 60mg daily but was decreased to 40mg last month due to "dizziness". Has baseline LE edema, with increased skin discoloration and small wound on RLE after the fall 3x weeks ago. Denies fever, chils, cough, wheezing, chest pain, N/V/D, abdominal pain, orthopnea. Last hospitalization with similar symptoms in 2018.    ED: afebrile, normotensive, RR 24, 95% on NRB 15L, Azithromycin, ceftriaxone, 2g Calcium gluconate given, Lasix 60mg IV given. Pt was initially on BIPAP and then changed to AVAPS.       PMHx:   Stented coronary artery x2 RCA 1998  Atrial fibrillation on eliquis  AICD (automatic cardioverter/defibrillator) present  CHF (congestive heart failure), EF 10%   COPD on home 2L NC  Pulmonary fibrosis 2/2 amiodarone      PSHx:   H/O abdominal hysterectomy  H/O hernia repair        Allergies:  amiodarone (Rash)  Augmentin (Rash)  Biaxin (Rash)  Cipro (Rash)  codeine (Rash)  latex (Unknown)  Levaquin (Rash)  morphine (Rash)  statins (Rash)      Home Meds:  carvedilol 6.5 BID  entresto 24-26  eliquis 2.5 BID  furosemide 40mg, decreased from 60mg 1.5 months ago  spironolactone 25mg   nitroglycerin 0.4 PRN      FAMILY HISTORY:  No pertinent family history in first degree relatives                   (13 Jul 2021 19:25)      PAST MEDICAL & SURGICAL HISTORY:  Stented coronary artery    Atrial fibrillation    AICD (automatic cardioverter/defibrillator) present    CHF (congestive heart failure)    Pulmonary fibrosis    H/O abdominal hysterectomy    H/O hernia repair            MEDICATIONS:  apixaban 2.5 milliGRAM(s) Oral two times a day  carvedilol 6.25 milliGRAM(s) Oral every 12 hours  furosemide    Tablet 60 milliGRAM(s) Oral daily  sacubitril 24 mG/valsartan 26 mG 1 Tablet(s) Oral two times a day  spironolactone 25 milliGRAM(s) Oral daily                  FAMILY HISTORY:  No pertinent family history in first degree relatives        Non-contributory    SOCIAL HISTORY:    not a smoker    Allergies    amiodarone (Rash)  Augmentin (Rash)  Biaxin (Rash)  Cipro (Rash)  codeine (Rash)  latex (Unknown)  Levaquin (Rash)  morphine (Rash)  statins (Rash)    Intolerances    	    REVIEW OF SYSTEMS:  CONSTITUTIONAL: No fever  EYES: No eye pain, visual disturbances, or discharge  ENMT:  No difficulty hearing, tinnitus  NECK: No pain or stiffness  RESPIRATORY: No cough, wheezing, +SOB  CARDIOVASCULAR: No chest pain, palpitations, passing out, dizziness, + leg swelling  GASTROINTESTINAL:  No nausea, vomiting, diarrhea or constipation. No melena.  GENITOURINARY: No dysuria, hematuria  NEUROLOGICAL: No stroke like symptoms  SKIN: No burning or lesions   ENDOCRINE: No heat or cold intolerance  MUSCULOSKELETAL: No joint pain or swelling  PSYCHIATRIC: No  anxiety, mood swings  HEME/LYMPH: No bleeding gums  ALLERGY AND IMMUNOLOGIC: No hives or eczema	    All other ROS negative    PHYSICAL EXAM:  T(C): 37.1 (07-15-21 @ 20:52), Max: 37.1 (07-15-21 @ 20:52)  HR: 61 (07-15-21 @ 20:52) (61 - 73)  BP: 119/69 (07-15-21 @ 20:52) (108/56 - 138/74)  RR: 18 (07-15-21 @ 20:52) (18 - 18)  SpO2: 97% (07-15-21 @ 20:52) (92% - 98%)  Wt(kg): --  I&O's Summary    14 Jul 2021 07:01  -  15 Jul 2021 07:00  --------------------------------------------------------  IN: 240 mL / OUT: 400 mL / NET: -160 mL    15 Jul 2021 07:01  -  15 Jul 2021 22:40  --------------------------------------------------------  IN: 477 mL / OUT: 300 mL / NET: 177 mL        Appearance: Normal	  HEENT:   Normal oral mucosa, EOMI	  Cardiovascular:  S1 S2, No JVD,    Respiratory: Lungs clear to auscultation	  Psychiatry: Alert  Gastrointestinal:  Soft, Non-tender, + BS	  Skin: No rashes   Neurologic: Non-focal  Extremities:  No edema  Vascular: Peripheral pulses palpable    	    	  	  CARDIAC MARKERS:  Labs personally reviewed by me              EKG: Personally reviewed by me - AF with TWI  Radiology: Personally reviewed by me -   < from: Xray Chest 1 View-PORTABLE IMMEDIATE (Xray Chest 1 View-PORTABLE IMMEDIATE .) (07.13.21 @ 09:44) >  The heart is enlarged. Pulmonary vascular congestion. Bilateral small pleural effusion cannot be ruled out.       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      Problem/Plan - 1:  ·  Problem: Acute systolic HF  Plan: - previous EF was 10% (2018) w/ AICD   -c/w lasix   - TTE   - c/w Entresto, Coreg, Aldactone   - consider Farxiga as outpt      Problem/Plan - 2:  ·  Problem: Atrial fibrillation.  Plan: - hx A.fib  - AURELIO-VASC 5  - on eliquis 2.5 BID      Problem/Plan - 3:  ·  Problem: Acute respiratory failure with hypercapnia.  Plan: - initial pH 7.15 and CO2 > 100 on VBG  - warm and wet   - BIPAP--> AVAPS --> Venturi 50% --> now 2L NC.   -sat well on O2     Problem/Plan - 4:  ·  Problem: HTN   -c/w home meds   -BP controlled             Gamaliel Russell DO [facility]  Cardiovascular Medicine  [address], [address]  Office: [phone]  Cell: [phone]          Differential diagnosis and plan of care discussed with patient after the evaluation. Counseling on diet, nutritional counseling, weight management, exercise and medication compliance was done.   Advanced care planning/advanced directives discussed with patient/family. DNR status including forceful chest compressions to attempt to restart the heart, ventilator support/artificial breathing, electric shock, artificial nutrition, health care proxy, Molst form all discussed with pt. Pt wishes to consider.          Gamaliel Russell DO [facility]  Cardiovascular Medicine  [address], [address]  Office [phone]  Cell [phone]

## 2021-07-14 NOTE — PROGRESS NOTE ADULT - PROBLEM SELECTOR PLAN 1
- initial pH 7.15 and CO2 > 100 on VBG  - warm and wet   - hypercapnia improved on BIPAP, however, patient only pulled tidal volumes of 200-250cc on 12/5, 40% so upgraded to AVAPS with now 400ccs TV  - pH 7.34 with pCO2 76 on evening ABG  - hypercapnia with appropriate metabolic compensation  - BIPAP for now, repeat ABG AM with possible transition to non-rebreather tomorrow - initial pH 7.15 and CO2 > 100 on VBG  - warm and wet   - hypercapnia improved on BIPAP, however, patient only pulled tidal volumes of 200-250cc on 12/5, 40% so upgraded to AVAPS with now 400ccs TV  - pH 7.34 with pCO2 76 on evening ABG  - hypercapnia with appropriate metabolic compensation  - BIPAP--> AVAPS --> Venturi 50% --> now 2L NC

## 2021-07-14 NOTE — PROGRESS NOTE ADULT - PROBLEM SELECTOR PLAN 2
- CHF w/ EF 10% (2018) w/ AICD   - s/p 60mg IV lasix ED  - c/w home carvedilol, entresto, hold spironolactone  - increased lasix to 40mg BID, descalade as needed   - strict IN/OUTs  - echo - CHF w/ EF 10% (2018) w/ AICD   - s/p 60mg IV lasix ED  - c/w home carvedilol, entresto, spironolactone  - lasix to 40mg IV BID, descalade as needed   - strict IN/OUTs  - echo

## 2021-07-14 NOTE — DISCHARGE NOTE PROVIDER - NSDCCPCAREPLAN_GEN_ALL_CORE_FT
PRINCIPAL DISCHARGE DIAGNOSIS  Diagnosis: Acute on chronic congestive heart failure, unspecified heart failure type  Assessment and Plan of Treatment: You were admitted to the hospital with respiratory failure likely caused by a congestive heart failure flare. Your blood became acidic because of the breathing problems. You were given Lasix to help remove some of the excess fluid from your lungs, abdomen and legs. You were placed on a breathing device called BIPAP overnight to help improve your breathing and decrease the acidity of your blood. You were continued on a lower dose of Lasix and switched to a venturi mask and then a nasal cannula as your status improved and you started feeling better. We completed an echocardiogram to assess how well your heart is able to pump. This test showed ...   We called your electrophysioly office at [facility] and they told us that your AICD had 3 months of battery life remaining. They agreed with the electrophysiologists at [facility] that you should have your AICD battery replaced at your scheduled outpatient visit on 7/23/21. We called and updated your cardiologist, Dr. Russell, on your hospital stay and status.   If you experience any new onset of symptoms, such as chest pain, shortness of breath, wheezing, cough, abdominal pain, excessive leg swelling, please contact your primary medical doctor and seek medical care as needed. Please take your medications as prescribed.       PRINCIPAL DISCHARGE DIAGNOSIS  Diagnosis: Acute on chronic congestive heart failure, unspecified heart failure type  Assessment and Plan of Treatment: You were admitted to the hospital with respiratory failure likely caused by decreased lung ability to remove carbon dioxide and increased pressure on the lungs from fluid buildup in your body from your heart. Your blood became acidic because of the breathing problems. You were given Lasix to help remove some of the excess fluid from your lungs, abdomen and legs. You were placed on a breathing device called BIPAP overnight to help improve your breathing and decrease the acidity of your blood. You were continued on a lower dose of Lasix and switched to a venturi mask and then a nasal cannula as your status improved and you started feeling better. We completed an echocardiogram to assess how well your heart is able to pump. This test showed improved heart function from previous studies. We spoke to your Cardiologist and your Pulmonologist and they have agreed that the continuous oxygen use may contribute to your lungs retaining carbon dioxide. Dr. Arciniega would like to further evaluate your oxygen requirenments after discharge. Please make an appointment within 2 weeks, preferrably one week, to obtain further recommendations and tesing.      We called your electrophysioly office at [facility] and they told us that your AICD had 3 months of battery life remaining. They agreed with the electrophysiologists at [facility] that you should have your AICD battery replaced at your scheduled outpatient visit. We called and updated your cardiologist, Dr. Russell, on your hospital stay and status.   If you experience any new onset of symptoms, such as chest pain, shortness of breath, wheezing, cough, abdominal pain, excessive leg swelling, please contact your primary medical doctor and seek medical care as needed. Please take your medications as prescribed.

## 2021-07-14 NOTE — PROGRESS NOTE ADULT - PROBLEM SELECTOR PLAN 5
- due for replacement Friday - due for replacement Friday  - EP to interrogate - due for replacement Friday  - EP to interrogate  - spoke to Janie, the PA of the patient's electrophysiologist at [facility] Dr. Au. Pt had AICD interrogated July 8 which showed 3 months of battery left. The pt is scheduled for battery change on July 23 and per Janie, no indication to change the AICD inpatient. Spoke to inpatient EP NP, no indication to intervene inpatient.

## 2021-07-14 NOTE — DISCHARGE NOTE PROVIDER - NSDCMRMEDTOKEN_GEN_ALL_CORE_FT
carvedilol 6.25 mg oral tablet: 1 tab(s) orally 2 times a day  Dulcolax Laxative 5 mg oral delayed release tablet: tab(s) orally once a day, As Needed  Eliquis 2.5 mg oral tablet: 1 tab(s) orally 2 times a day  Entresto 24 mg-26 mg oral tablet: 1 tab(s) orally 2 times a day  furosemide 40 mg oral tablet: 1 tab(s) orally once a day    NOTE: pharmacy dispensed directions 1.5 tab once a day, as per patient MD decreased to 1 tab once a day  nitroglycerin 0.4 mg sublingual tablet: 1 tab(s) sublingual every 5 minutes, As Needed  spironolactone 25 mg oral tablet: 1 tab(s) orally once a day   carvedilol 6.25 mg oral tablet: 1 tab(s) orally 2 times a day  Dulcolax Laxative 5 mg oral delayed release tablet: tab(s) orally once a day, As Needed  Eliquis 2.5 mg oral tablet: 1 tab(s) orally 2 times a day  Entresto 24 mg-26 mg oral tablet: 1 tab(s) orally 2 times a day  Lasix 20 mg oral tablet: 3 tab(s) orally once a day  nitroglycerin 0.4 mg sublingual tablet: 1 tab(s) sublingual every 5 minutes, As Needed  spironolactone 25 mg oral tablet: 1 tab(s) orally once a day

## 2021-07-14 NOTE — DISCHARGE NOTE PROVIDER - CARE PROVIDER_API CALL
Gamaliel Russell (DO)  Cardiovascular Disease; Internal Medicine; Nuclear Cardiology  [address] [facility], [address]  [place] [address]  Phone: [phone]  Fax: [phone]  Follow Up Time:     bOi Arciniega)  Critical Care Medicine; Internal Medicine; Pulmonary Disease  [address] [place] [address]  Phone: [phone]  Fax: [phone]  Follow Up Time:     Wesly Cheney)  Internal Medicine  [address] [address]  Phone: [phone]  Fax: [phone]  Follow Up Time:     Gonzalez Au  Phone: (   )    -  Fax: (   )    -  Follow Up Time:

## 2021-07-14 NOTE — DISCHARGE NOTE PROVIDER - CARE PROVIDERS DIRECT ADDRESSES
,DirectAddress_Unknown,[email or web address],DirectAddress_Unknown

## 2021-07-14 NOTE — DISCHARGE NOTE PROVIDER - HOSPITAL COURSE
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 Pt also has increasing SOB 1x week that is associated with abdominal fullness. Pt was on lasix 60mg daily but was decreased to 40mg last month due to "dizziness". Has baseline LE edema, with increased skin discoloration and small wound on RLE after the fall 3x weeks ago. Denies fever, chils, cough, wheezing, chest pain, N/V/D, abdominal pain, orthopnea. Last hospitalization with similar symptoms in 2018.    ED: afebrile, normotensive, RR 24, 95% on NRB 15L, Azithromycin, ceftriaxone, 2g Calcium gluconate given for hyperkalemia of 5.6, Lasix 60mg IV given. Spironolactone held i/s/o hyperkalemia. Pt was initially on BIPAP and then changed to AVAPS.    Hospital course:  The patient was started on Lasix 40mg IV BID. Respiratory acidosis resolved, with VBG pH = 7.38 (AM 7/14). Hypercapnia improving with CO2 = 69 (AM 7/14). AVAPS was transitioned to venturi 50% and then to 2L O2 via NC. Patient comfortable with O2 sat ~95% on 2L NC. Hyperkalemia resolved with K+ = 4.3 (AM 7/14).  Outpatient EP endorsed stable AICD with 3 month battery life and recommended outpatient scheduled AICD battery replacement on 7/23/21. Outpatient cardiologist, Dr. Gamaliel Russell, updated on patient's hospital course and status. 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 Pt also has increasing SOB 1x week that is associated with abdominal fullness. Pt was on lasix 60mg daily but was decreased to 40mg last month due to "dizziness". Has baseline LE edema, with increased skin discoloration and small wound on RLE after the fall 3x weeks ago. Denies fever, chils, cough, wheezing, chest pain, N/V/D, abdominal pain, orthopnea. Last hospitalization with similar symptoms in 2018.    ED: afebrile, normotensive, RR 24, 95% on NRB 15L, Azithromycin, ceftriaxone, 2g Calcium gluconate given for hyperkalemia of 5.6, Lasix 60mg IV given. Spironolactone held i/s/o hyperkalemia. Pt was initially on BIPAP and then changed to AVAPS.    Hospital course:  The patient was started on Lasix 40mg IV BID. Respiratory acidosis resolved, with VBG pH = 7.38 (AM 7/14). Hypercapnia improving with CO2 = 69 (AM 7/14). AVAPS was transitioned to venturi 50% and then to 2L O2 via NC. Patient comfortable with O2 sat ~95% on 2L NC. Hyperkalemia resolved with K+ = 4.3 (AM 7/14).  Outpatient EP endorsed stable AICD with 3 month battery life and recommended outpatient scheduled AICD battery replacement on 7/23/21. Outpatient cardiologist, Dr. Gamaliel Russell, updated on patient's hospital course and status. Etiology of initial respiratory failure thought to be secondary to acute decompensated heart failure. However, echocardiogram demonstrates recovered function of the left ventricle and significant pulmonary hypertension. We suspect patient may have obesity-hypoventilation syndrome. We discussed case with outpatient pulmonologist Dr. Hamilton who recommended outpatient evaluation with him. On discharge, the patient's condition is improved and stable, and pt is sent home with appropriate outpatient follow up.

## 2021-07-14 NOTE — PHYSICAL THERAPY INITIAL EVALUATION ADULT - DISCHARGE PLANNER MADE AWARE
Telephone Encounter by Reina Comer RN at 05/14/18 11:52 AM     Author:  Reina Comer RN Service:  (none) Author Type:  Registered Nurse     Filed:  05/14/18 11:54 AM Encounter Date:  5/14/2018 Status:  Signed     :  Reina Comer RN (Registered Nurse)            Message to RA  Please see note below.  Within the last 10 days the patient has had extreme constipation which she feels is linked to her hemorrhoid.  She has been taking milk of magnesia but it has still been difficult to have a BM and at the time of a BM she experiences severe rectal pain and cramping.  She is to see the surgeon on the 25th of May but wants to know what to do in the interim about her situation.[SB1.1M]      Revision History        User Key Date/Time User Provider Type Action    > SB1.1 05/14/18 11:54 AM Reina Comer RN Registered Nurse Sign    M - Manual             yes

## 2021-07-14 NOTE — PHYSICAL THERAPY INITIAL EVALUATION ADULT - ADDITIONAL COMMENTS
Per pt and dtr at bedside, pt lives in co-op with  and dtr (who has disability). Has 3+3 steps to enter (+HR) with long hallways. Reports being independent with functional mobility prior to admission. Has RW, shower chair, and grab bars in shower. Dtr requesting new transport chair 2/2 old one not in good condition.

## 2021-07-14 NOTE — DISCHARGE NOTE PROVIDER - PROVIDER TOKENS
PROVIDER:[TOKEN:[[id number]]],PROVIDER:[TOKEN:[[id number]]],PROVIDER:[TOKEN:[[id number]]],FREE:[LAST:[Roe],FIRST:[Gonzalez],PHONE:[(   )    -],FAX:[(   )    -]]

## 2021-07-14 NOTE — PROGRESS NOTE ADULT - SUBJECTIVE AND OBJECTIVE BOX
Patient seen and examined at bedside.    Overnight Events: Pt was uncomfortable with BIPAP and briefly placed on nasal cannula. Overnight team convinced the patient to go back to BIPAP. Otherwise feeling well, no CP, no SOB    Review Of Systems: No chest pain, shortness of breath, or palpitations            Current Meds:  apixaban 2.5 milliGRAM(s) Oral two times a day  carvedilol 6.25 milliGRAM(s) Oral every 12 hours  furosemide   Injectable 40 milliGRAM(s) IV Push two times a day  sacubitril 24 mG/valsartan 26 mG 1 Tablet(s) Oral two times a day      Vitals:  T(F): 98.2 (07-14), Max: 98.4 (07-13)  HR: 60 (07-14) (57 - 70)  BP: 123/66 (07-14) (114/45 - 154/84)  RR: 18 (07-14)  SpO2: 97% (07-14)  I&O's Summary    13 Jul 2021 07:01  -  14 Jul 2021 07:00  --------------------------------------------------------  IN: 0 mL / OUT: 1750 mL / NET: -1750 mL        Physical Exam:  Appearance: No acute distress; well appearing  Eyes: PERRL, EOMI, pink conjunctiva  HEENT: Normal oral mucosa  Cardiovascular: RRR, S1, S2, no murmurs, rubs, or gallops; no edema; no JVD  Respiratory: Clear to auscultation bilaterally  Gastrointestinal: soft, non-tender, non-distended with normal bowel sounds  Musculoskeletal: No clubbing; no joint deformity   Neurologic: Non-focal  Lymphatic: No lymphadenopathy  Psychiatry: AAOx3, mood & affect appropriate  Skin: No rashes, ecchymoses, or cyanosis                          9.5    5.31  )-----------( 110      ( 14 Jul 2021 06:07 )             33.6     07-14    142  |  99  |  25<H>  ----------------------------<  84  4.3   |  33<H>  |  0.76    Ca    9.5      14 Jul 2021 06:07  Phos  2.6     07-14  Mg     2.3     07-14    TPro  7.5  /  Alb  4.0  /  TBili  0.3  /  DBili  x   /  AST  26  /  ALT  29  /  AlkPhos  67  07-13    PT/INR - ( 13 Jul 2021 11:51 )   PT: 13.1 sec;   INR: 1.10 ratio         PTT - ( 13 Jul 2021 11:51 )  PTT:27.3 sec  CARDIAC MARKERS ( 13 Jul 2021 11:52 )  25 ng/L / x     / x     / x     / x     / x      CARDIAC MARKERS ( 13 Jul 2021 09:59 )  23 ng/L / x     / x     / x     / x     / x          Serum Pro-Brain Natriuretic Peptide: 1789 pg/mL (07-13 @ 09:59)     Patient seen and examined at bedside.    Overnight Events: Pt was uncomfortable with BIPAP and wants to have it removed. BIPAP de-escalated to Venturi mask, then 2L NC. Otherwise feeling well, no CP, no SOB.     Review Of Systems: No chest pain, shortness of breath, or palpitations            Current Meds:  apixaban 2.5 milliGRAM(s) Oral two times a day  carvedilol 6.25 milliGRAM(s) Oral every 12 hours  furosemide   Injectable 40 milliGRAM(s) IV Push two times a day  sacubitril 24 mG/valsartan 26 mG 1 Tablet(s) Oral two times a day      Vitals:  T(F): 98.2 (07-14), Max: 98.4 (07-13)  HR: 60 (07-14) (57 - 70)  BP: 123/66 (07-14) (114/45 - 154/84)  RR: 18 (07-14)  SpO2: 97% (07-14)  I&O's Summary    13 Jul 2021 07:01  -  14 Jul 2021 07:00  --------------------------------------------------------  IN: 0 mL / OUT: 1750 mL / NET: -1750 mL        Physical Exam:  Appearance: No acute distress; well appearing  Eyes: PERRL, EOMI, pink conjunctiva  HEENT: Normal oral mucosa  Cardiovascular: RRR, S1, S2, no murmurs, rubs, or gallops; no edema; no JVD  Respiratory: Clear to auscultation bilaterally  Gastrointestinal: soft, non-tender, non-distended with normal bowel sounds  Musculoskeletal: No clubbing; no joint deformity   Neurologic: Non-focal  Lymphatic: No lymphadenopathy  Psychiatry: AAOx3, mood & affect appropriate  Skin: No rashes, ecchymoses, or cyanosis                          9.5    5.31  )-----------( 110      ( 14 Jul 2021 06:07 )             33.6     07-14    142  |  99  |  25<H>  ----------------------------<  84  4.3   |  33<H>  |  0.76    Ca    9.5      14 Jul 2021 06:07  Phos  2.6     07-14  Mg     2.3     07-14    TPro  7.5  /  Alb  4.0  /  TBili  0.3  /  DBili  x   /  AST  26  /  ALT  29  /  AlkPhos  67  07-13    PT/INR - ( 13 Jul 2021 11:51 )   PT: 13.1 sec;   INR: 1.10 ratio         PTT - ( 13 Jul 2021 11:51 )  PTT:27.3 sec  CARDIAC MARKERS ( 13 Jul 2021 11:52 )  25 ng/L / x     / x     / x     / x     / x      CARDIAC MARKERS ( 13 Jul 2021 09:59 )  23 ng/L / x     / x     / x     / x     / x          Serum Pro-Brain Natriuretic Peptide: 1789 pg/mL (07-13 @ 09:59)     Patient seen and examined at bedside.    Overnight Events: Pt was uncomfortable with BIPAP and wants to have it removed. BIPAP de-escalated to Venturi mask, then 2L NC. Otherwise feeling well, no CP, no SOB.     Review Of Systems: No chest pain, shortness of breath, or palpitations              EP: Gonzalez Au [phone]  PCP: Wesly Cheney [phone]  Cardiologist: Dr. Mcgill [phone]  Pulm: Obi Hamilton [phone]    Current Meds:  apixaban 2.5 milliGRAM(s) Oral two times a day  carvedilol 6.25 milliGRAM(s) Oral every 12 hours  furosemide   Injectable 40 milliGRAM(s) IV Push two times a day  sacubitril 24 mG/valsartan 26 mG 1 Tablet(s) Oral two times a day      Vitals:  T(F): 98.2 (07-14), Max: 98.4 (07-13)  HR: 60 (07-14) (57 - 70)  BP: 123/66 (07-14) (114/45 - 154/84)  RR: 18 (07-14)  SpO2: 97% (07-14)  I&O's Summary    13 Jul 2021 07:01  -  14 Jul 2021 07:00  --------------------------------------------------------  IN: 0 mL / OUT: 1750 mL / NET: -1750 mL        Physical Exam:  Appearance: No acute distress; well appearing  Eyes: PERRL, EOMI, pink conjunctiva  HEENT: Normal oral mucosa  Cardiovascular: RRR, S1, S2, no murmurs, rubs, or gallops; no edema; no JVD  Respiratory: Clear to auscultation bilaterally  Gastrointestinal: soft, non-tender, non-distended with normal bowel sounds  Musculoskeletal: No clubbing; no joint deformity   Neurologic: Non-focal  Lymphatic: No lymphadenopathy  Psychiatry: AAOx3, mood & affect appropriate  Skin: No rashes, ecchymoses, or cyanosis                          9.5    5.31  )-----------( 110      ( 14 Jul 2021 06:07 )             33.6     07-14    142  |  99  |  25<H>  ----------------------------<  84  4.3   |  33<H>  |  0.76    Ca    9.5      14 Jul 2021 06:07  Phos  2.6     07-14  Mg     2.3     07-14    TPro  7.5  /  Alb  4.0  /  TBili  0.3  /  DBili  x   /  AST  26  /  ALT  29  /  AlkPhos  67  07-13    PT/INR - ( 13 Jul 2021 11:51 )   PT: 13.1 sec;   INR: 1.10 ratio         PTT - ( 13 Jul 2021 11:51 )  PTT:27.3 sec  CARDIAC MARKERS ( 13 Jul 2021 11:52 )  25 ng/L / x     / x     / x     / x     / x      CARDIAC MARKERS ( 13 Jul 2021 09:59 )  23 ng/L / x     / x     / x     / x     / x          Serum Pro-Brain Natriuretic Peptide: 1789 pg/mL (07-13 @ 09:59)

## 2021-07-14 NOTE — DISCHARGE NOTE PROVIDER - NSDCFUADDAPPT_GEN_ALL_CORE_FT
Please make an appointment with your primary care doctor Dr. Cheney within 2 weeks for management of medical conditions.    Please make an appointment with your Pulmonologist Dr. Arciniega within one week to further workup your oxygen requirement.    Please keep your appointment in August with Dr. Au to replace the batteries to your AICD device.     Please make an appointment with your cardiologist Dr. Russell to follow up on your heart functio.

## 2021-07-14 NOTE — PHYSICAL THERAPY INITIAL EVALUATION ADULT - GENERAL OBSERVATIONS, REHAB EVAL
Rec'd seated in chair, in NAD, +IVL, +NC 2L, +tele. Agreeable to PT. RN cleared pt to be seen. A/w acute on chronic hypercapnic respiratory failure, in the setting of increased edema likely caused by acute on chronic CHF.

## 2021-07-14 NOTE — CHART NOTE - NSCHARTNOTEFT_GEN_A_CORE
Was informed by nurse that pt could not tolerate bipap and was put on nasal cannula, I went to pt's room and explained the risks of taking of bipap, able to convince her to put the bipap on again, will continue to monitor.

## 2021-07-14 NOTE — PROGRESS NOTE ADULT - PROBLEM SELECTOR PLAN 4
- 5.6 in the ED, likely due to metabolic acidosis   - s/p calcium gluconate  - c/w lasix  - repeat BMP tonight  - BMP tomorrow - 5.6 in the ED, likely due to metabolic acidosis   - s/p calcium gluconate  - c/w lasix  - BMP today, resolved

## 2021-07-15 DIAGNOSIS — I48.0 PAROXYSMAL ATRIAL FIBRILLATION: ICD-10-CM

## 2021-07-15 LAB
ANION GAP SERPL CALC-SCNC: 8 MMOL/L — SIGNIFICANT CHANGE UP (ref 5–17)
BUN SERPL-MCNC: 22 MG/DL — SIGNIFICANT CHANGE UP (ref 7–23)
CALCIUM SERPL-MCNC: 9.5 MG/DL — SIGNIFICANT CHANGE UP (ref 8.4–10.5)
CHLORIDE SERPL-SCNC: 96 MMOL/L — SIGNIFICANT CHANGE UP (ref 96–108)
CO2 SERPL-SCNC: 37 MMOL/L — HIGH (ref 22–31)
CREAT SERPL-MCNC: 0.77 MG/DL — SIGNIFICANT CHANGE UP (ref 0.5–1.3)
GAS PNL BLDA: SIGNIFICANT CHANGE UP
GLUCOSE SERPL-MCNC: 93 MG/DL — SIGNIFICANT CHANGE UP (ref 70–99)
HCT VFR BLD CALC: 34.8 % — SIGNIFICANT CHANGE UP (ref 34.5–45)
HGB BLD-MCNC: 10.2 G/DL — LOW (ref 11.5–15.5)
MAGNESIUM SERPL-MCNC: 2.2 MG/DL — SIGNIFICANT CHANGE UP (ref 1.6–2.6)
MCHC RBC-ENTMCNC: 27.6 PG — SIGNIFICANT CHANGE UP (ref 27–34)
MCHC RBC-ENTMCNC: 29.3 GM/DL — LOW (ref 32–36)
MCV RBC AUTO: 94.1 FL — SIGNIFICANT CHANGE UP (ref 80–100)
NRBC # BLD: 0 /100 WBCS — SIGNIFICANT CHANGE UP (ref 0–0)
PLATELET # BLD AUTO: 124 K/UL — LOW (ref 150–400)
POTASSIUM SERPL-MCNC: 4.4 MMOL/L — SIGNIFICANT CHANGE UP (ref 3.5–5.3)
POTASSIUM SERPL-SCNC: 4.4 MMOL/L — SIGNIFICANT CHANGE UP (ref 3.5–5.3)
RBC # BLD: 3.7 M/UL — LOW (ref 3.8–5.2)
RBC # FLD: 14.5 % — SIGNIFICANT CHANGE UP (ref 10.3–14.5)
SODIUM SERPL-SCNC: 141 MMOL/L — SIGNIFICANT CHANGE UP (ref 135–145)
WBC # BLD: 7.77 K/UL — SIGNIFICANT CHANGE UP (ref 3.8–10.5)
WBC # FLD AUTO: 7.77 K/UL — SIGNIFICANT CHANGE UP (ref 3.8–10.5)

## 2021-07-15 PROCEDURE — 99233 SBSQ HOSP IP/OBS HIGH 50: CPT | Mod: GC

## 2021-07-15 RX ORDER — FUROSEMIDE 40 MG
60 TABLET ORAL DAILY
Refills: 0 | Status: DISCONTINUED | OUTPATIENT
Start: 2021-07-15 | End: 2021-07-16

## 2021-07-15 RX ADMIN — APIXABAN 2.5 MILLIGRAM(S): 2.5 TABLET, FILM COATED ORAL at 17:15

## 2021-07-15 RX ADMIN — SACUBITRIL AND VALSARTAN 1 TABLET(S): 24; 26 TABLET, FILM COATED ORAL at 05:30

## 2021-07-15 RX ADMIN — SACUBITRIL AND VALSARTAN 1 TABLET(S): 24; 26 TABLET, FILM COATED ORAL at 17:15

## 2021-07-15 RX ADMIN — APIXABAN 2.5 MILLIGRAM(S): 2.5 TABLET, FILM COATED ORAL at 05:30

## 2021-07-15 RX ADMIN — CARVEDILOL PHOSPHATE 6.25 MILLIGRAM(S): 80 CAPSULE, EXTENDED RELEASE ORAL at 05:30

## 2021-07-15 RX ADMIN — Medication 40 MILLIGRAM(S): at 05:30

## 2021-07-15 RX ADMIN — CARVEDILOL PHOSPHATE 6.25 MILLIGRAM(S): 80 CAPSULE, EXTENDED RELEASE ORAL at 17:15

## 2021-07-15 RX ADMIN — SPIRONOLACTONE 25 MILLIGRAM(S): 25 TABLET, FILM COATED ORAL at 05:30

## 2021-07-15 NOTE — PROGRESS NOTE ADULT - PROBLEM SELECTOR PLAN 2
- CHF w/ EF 10% (2018) w/ AICD   - s/p 60mg IV lasix ED  - c/w home carvedilol, entresto, spironolactone  - lasix to 40mg IV BID, descalade as needed   - strict IN/OUTs  - echo - CHF w/ EF 25% (2018) w/ AICD   - s/p 60mg IV lasix ED  - c/w home carvedilol, entresto, spironolactone  - lasix to 40mg IV BID; will transition today to 60 mg of oral furosemide daily    - strict IN/OUTs  - echocardiogram today demonstrates significantly recovered ejection fraction (45%); wonder if patient's initial episode represented not acute decompensated left-sided heart failure, but rather obesity-hypoventilation syndrome

## 2021-07-15 NOTE — PHARMACOTHERAPY INTERVENTION NOTE - COMMENTS
Provided medication cards and reviewed the indications and directions medications. Reviewed the side effects and monitoring parameters for each medication. Emphasized the importance of adherence and follow up with outpatient provider to ensure continuation of care. Answered all patient's questions and patient expressed understanding of all medications.     Jean-Paul Bright, FawnD

## 2021-07-15 NOTE — PROGRESS NOTE ADULT - PROBLEM SELECTOR PLAN 6
Diet- NPO, DASH when off bipap   Dispo- home  DVT- eliquis 2.5BID  GOC- DNR/DNI as per documentation from micu consult; clinically stable at this time and will readdress goals of care with family in am
Diet- NPO, DASH when off bipap   Dispo- home  DVT- eliquis 2.5BID  GOC- DNR/DNI as per documentation from micu consult; clinically stable at this time and will readdress goals of care with family in am

## 2021-07-15 NOTE — PROGRESS NOTE ADULT - ASSESSMENT
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

## 2021-07-15 NOTE — PROGRESS NOTE ADULT - PROBLEM SELECTOR PLAN 5
- due for replacement Friday  - EP to interrogate  - spoke to Janie, the PA of the patient's electrophysiologist at [facility] Dr. Au. Pt had AICD interrogated July 8 which showed 3 months of battery left. The pt is scheduled for battery change on July 23 and per Janie, no indication to change the AICD inpatient. Spoke to inpatient EP NP, no indication to intervene inpatient. Diet- NPO, DASH when off bipap   Dispo- home  DVT- eliquis 2.5BID  GOC- DNR/DNI as per documentation from micu consult; clinically stable at this time and will readdress goals of care with family in am

## 2021-07-15 NOTE — PROGRESS NOTE ADULT - SUBJECTIVE AND OBJECTIVE BOX
Rajan Recinos pgy2   [id number]    INTERVAL HPI/OVERNIGHT EVENTS:    SUBJECTIVE: Patient seen and examined at bedside.     CONSTITUTIONAL: No weakness, fevers, or chills  EYES/ENT: No visual changes;  No vertigo or throat pain   NECK: No pain, no stiffness  RESPIRATORY: No cough, no shortness of breath  CARDIOVASCULAR: No chest pain, no palpitations  GASTROINTESTINAL: No abdominal or epigastric pain. No nausea, vomiting, or hematemesis; No diarrhea or constipation. No melena or hematochezia.  GENITOURINARY: No dysuria, frequency, or hematuria  NEUROLOGICAL: No numbness, no weakness  SKIN: No itching, no rashes    OBJECTIVE:    VITAL SIGNS:  ICU Vital Signs Last 24 Hrs  T(C): 36.7 (15 Jul 2021 04:35), Max: 36.8 (14 Jul 2021 20:59)  T(F): 98.1 (15 Jul 2021 04:35), Max: 98.3 (14 Jul 2021 20:59)  HR: 66 (15 Jul 2021 06:05) (60 - 78)  BP: 138/74 (15 Jul 2021 04:35) (112/62 - 138/74)  BP(mean): --  ABP: --  ABP(mean): --  RR: 18 (15 Jul 2021 04:35) (18 - 24)  SpO2: 95% (15 Jul 2021 06:05) (92% - 100%)        07-14 @ 07:01  -  07-15 @ 07:00  --------------------------------------------------------  IN: 240 mL / OUT: 400 mL / NET: -160 mL      CAPILLARY BLOOD GLUCOSE          PHYSICAL EXAM:    General: NAD; awake and alert   HEENT: PERRL grossly, clear conjunctiva  Neck: No jvd, no lymphadenopathy  Respiratory: CTA b/l, no rales, no wheezes; equal respiratory excursion   Cardiovascular: +S1/S2; RRR  Abdomen: soft, NT/ND; +BS x4  Extremities: WWP, 2+ peripheral pulses b/l; no LE edema  Skin: normal color and turgor; no rash  Neurological: No gross motor or sensory deficits; coordination intact     MEDICATIONS:  MEDICATIONS  (STANDING):  apixaban 2.5 milliGRAM(s) Oral two times a day  carvedilol 6.25 milliGRAM(s) Oral every 12 hours  furosemide   Injectable 40 milliGRAM(s) IV Push two times a day  sacubitril 24 mG/valsartan 26 mG 1 Tablet(s) Oral two times a day  spironolactone 25 milliGRAM(s) Oral daily    MEDICATIONS  (PRN):      ALLERGIES:  Allergies    amiodarone (Rash)  Augmentin (Rash)  Biaxin (Rash)  Cipro (Rash)  codeine (Rash)  latex (Unknown)  Levaquin (Rash)  morphine (Rash)  statins (Rash)    Intolerances        LABS:                        10.2   7.77  )-----------( 124      ( 15 Jul 2021 05:38 )             34.8     07-15    141  |  96  |  22  ----------------------------<  93  4.4   |  37<H>  |  0.77    Ca    9.5      15 Jul 2021 05:38  Phos  2.4     07-14  Mg     2.2     07-15    TPro  7.5  /  Alb  4.0  /  TBili  0.3  /  DBili  x   /  AST  26  /  ALT  29  /  AlkPhos  67  07-13    PT/INR - ( 13 Jul 2021 11:51 )   PT: 13.1 sec;   INR: 1.10 ratio         PTT - ( 13 Jul 2021 11:51 )  PTT:27.3 sec      RADIOLOGY & ADDITIONAL TESTS: Reviewed. Rajan Recinos [id number]   [phone]    INTERVAL HPI/OVERNIGHT EVENTS: The patient was transitioned off of her bilevel positive airway pressure machine and had persistent diuresis of fluid.     SUBJECTIVE: Patient seen and examined at bedside.     CONSTITUTIONAL: No weakness, fevers, or chills  EYES/ENT: No visual changes; no dizziness    NECK: No pain, no stiffness  RESPIRATORY: No cough, no shortness of breath  CARDIOVASCULAR: No chest pain, no palpitations  GASTROINTESTINAL: No abdominal or epigastric pain. No nausea, vomiting, or hematemesis; No diarrhea or constipation. No melena or hematochezia.  GENITOURINARY: No dysuria, frequency, or hematuria  NEUROLOGICAL: No numbness, no weakness  SKIN: No itching, no rashes    OBJECTIVE:    VITAL SIGNS:  T(C): 36.7 (15 Jul 2021 04:35), Max: 36.8 (14 Jul 2021 20:59)  T(F): 98.1 (15 Jul 2021 04:35), Max: 98.3 (14 Jul 2021 20:59)  HR: 66 (15 Jul 2021 06:05) (60 - 78)  BP: 138/74 (15 Jul 2021 04:35) (112/62 - 138/74)  BP(mean): --  ABP: --  ABP(mean): --  RR: 18 (15 Jul 2021 04:35) (18 - 24)  SpO2: 95% (15 Jul 2021 06:05) (92% - 100%)        07-14 @ 07:01  -  07-15 @ 07:00  --------------------------------------------------------  IN: 240 mL / OUT: 400 mL / NET: -160 mL      CAPILLARY BLOOD GLUCOSE          PHYSICAL EXAM:    General: NAD; awake and alert; completing full sentences; not using accessory muscles of respiration   HEENT: PERRL grossly, clear conjunctiva  Neck: No jvd, no lymphadenopathy  Respiratory: CTA b/l, rales at bilateral lung bases, no wheezes; equal respiratory excursion   Cardiovascular: +S1/S2; RRR  Abdomen: soft, NT/ND; +BS x4  Extremities: WWP, 2+ peripheral pulses b/l; no LE edema  Skin: normal color and turgor; no rash  Neurological: No gross motor or sensory deficits; coordination intact     MEDICATIONS:  MEDICATIONS  (STANDING):  apixaban 2.5 milliGRAM(s) Oral two times a day  carvedilol 6.25 milliGRAM(s) Oral every 12 hours  furosemide   Injectable 40 milliGRAM(s) IV Push two times a day  sacubitril 24 mG/valsartan 26 mG 1 Tablet(s) Oral two times a day  spironolactone 25 milliGRAM(s) Oral daily    MEDICATIONS  (PRN):      ALLERGIES:  Allergies    amiodarone (Rash)  Augmentin (Rash)  Biaxin (Rash)  Cipro (Rash)  codeine (Rash)  latex (Unknown)  Levaquin (Rash)  morphine (Rash)  statins (Rash)    Intolerances        LABS:                        10.2   7.77  )-----------( 124      ( 15 Jul 2021 05:38 )             34.8     07-15    141  |  96  |  22  ----------------------------<  93  4.4   |  37<H>  |  0.77    Ca    9.5      15 Jul 2021 05:38  Phos  2.4     07-14  Mg     2.2     07-15    TPro  7.5  /  Alb  4.0  /  TBili  0.3  /  DBili  x   /  AST  26  /  ALT  29  /  AlkPhos  67  07-13    PT/INR - ( 13 Jul 2021 11:51 )   PT: 13.1 sec;   INR: 1.10 ratio         PTT - ( 13 Jul 2021 11:51 )  PTT:27.3 sec      RADIOLOGY & ADDITIONAL TESTS: Reviewed.

## 2021-07-15 NOTE — PROGRESS NOTE ADULT - SUBJECTIVE AND OBJECTIVE BOX
Patient is a 81y old  Female who presents with a chief complaint of Acute on chronic hypercapnic resp failure (14 Jul 2021 14:23)      INTERVAL HISTORY: pt reports feeling better     TELEMETRY Personally reviewed: veronika Frederick 60's-70's    REVIEW OF SYSTEMS:   CONSTITUTIONAL: No weakness  EYES/ENT: No visual changes; No throat pain  Neck: No pain or stiffness  Respiratory: No cough, wheezing, No shortness of breath  CARDIOVASCULAR: no chest pain or palpitations  GASTROINTESTINAL: No abdominal pain, no nausea, vomiting or hematemesis  GENITOURINARY: No dysuria, frequency or hematuria  NEUROLOGICAL: No stroke like symptoms  SKIN: No rashes    	  MEDICATIONS:  carvedilol 6.25 milliGRAM(s) Oral every 12 hours  furosemide    Tablet 60 milliGRAM(s) Oral daily  sacubitril 24 mG/valsartan 26 mG 1 Tablet(s) Oral two times a day  spironolactone 25 milliGRAM(s) Oral daily        PHYSICAL EXAM:  T(C): 36.8 (07-15-21 @ 11:39), Max: 36.8 (07-14-21 @ 20:59)  HR: 65 (07-15-21 @ 11:39) (65 - 73)  BP: 108/56 (07-15-21 @ 11:39) (108/56 - 138/74)  RR: 18 (07-15-21 @ 11:39) (18 - 22)  SpO2: 97% (07-15-21 @ 11:39) (92% - 99%)  Wt(kg): --  I&O's Summary    14 Jul 2021 07:01  -  15 Jul 2021 07:00  --------------------------------------------------------  IN: 240 mL / OUT: 400 mL / NET: -160 mL    15 Jul 2021 07:01  -  15 Jul 2021 15:44  --------------------------------------------------------  IN: 297 mL / OUT: 0 mL / NET: 297 mL        Weight (kg): 93.3 (07-15 @ 14:12)    Appearance: In no distress	  HEENT:    PERRL, EOMI	  Cardiovascular:  S1 S2, No JVD  Respiratory: Lungs clear to auscultation	  Gastrointestinal:  Soft, Non-tender, + BS	  Vascularature:  No edema of LE  Psychiatric: Appropriate affect   Neuro: no acute focal deficits                               10.2   7.77  )-----------( 124      ( 15 Jul 2021 05:38 )             34.8     07-15    141  |  96  |  22  ----------------------------<  93  4.4   |  37<H>  |  0.77    Ca    9.5      15 Jul 2021 05:38  Phos  2.4     07-14  Mg     2.2     07-15          Labs personally reviewed  radiology   < from: TTE with Doppler (w/Cont) (07.14.21 @ 14:18) >  Endocardial visualization enhanced with intravenous  injection of Ultrasonic Enhancing Agent (Definity).  Images are suboptimal despite the use of Definity.  Approximate ejection fraction 45-50%; this is a crude  estimation given the image quality.  Suboptimal visualization of the right heart.  Severe pulmonary hypertension.  A device wire is noted in the right heart.            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      Problem/Plan - 1:  ·  Problem: HF  Plan: - previous EF was 10% (2018) w/ AICD   -c/w lasix   - echo show improved EF of 40-50%, suggest Pulm HTN       Problem/Plan - 2:  ·  Problem: Atrial fibrillation.  Plan: - hx A.fib  - AURELIO-VASC 5  - on eliquis 2.5 BID      Problem/Plan - 3:  ·  Problem: Acute respiratory failure with hypercapnia.  Plan: - initial pH 7.15 and CO2 > 100 on VBG  - warm and wet   - BIPAP--> AVAPS --> Venturi 50% --> now 2L NC.   -sat well on O2     Problem/Plan - 4:  ·  Problem: HTN   -c/w home meds   -BP controlled              Patient seen and examined, case d/w house staff.  Acute hypercarbic respiratory failure from acute systolic heart failure exacerbation.  Both have resolved.  Can d/c BiPAP, O2 as needed, normally on 2 Liters  change to home Lasix dosing, 60 mg po daily  d/c planning .    Katya Cheung [facility]   Gamaliel Russell DO [facility]  Cardiovascular Medicine  [address] [place] [address], [address]  Office: [phone]  Cell: [phone] Patient is a 81y old  Female who presents with a chief complaint of Acute on chronic hypercapnic resp failure (14 Jul 2021 14:23)      INTERVAL HISTORY: pt reports feeling better     TELEMETRY Personally reviewed: veronika Frederick 60's-70's    REVIEW OF SYSTEMS:   CONSTITUTIONAL: No weakness  EYES/ENT: No visual changes; No throat pain  Neck: No pain or stiffness  Respiratory: No cough, wheezing, No shortness of breath  CARDIOVASCULAR: no chest pain or palpitations  GASTROINTESTINAL: No abdominal pain, no nausea, vomiting or hematemesis  GENITOURINARY: No dysuria, frequency or hematuria  NEUROLOGICAL: No stroke like symptoms  SKIN: No rashes    	  MEDICATIONS:  carvedilol 6.25 milliGRAM(s) Oral every 12 hours  furosemide    Tablet 60 milliGRAM(s) Oral daily  sacubitril 24 mG/valsartan 26 mG 1 Tablet(s) Oral two times a day  spironolactone 25 milliGRAM(s) Oral daily        PHYSICAL EXAM:  T(C): 36.8 (07-15-21 @ 11:39), Max: 36.8 (07-14-21 @ 20:59)  HR: 65 (07-15-21 @ 11:39) (65 - 73)  BP: 108/56 (07-15-21 @ 11:39) (108/56 - 138/74)  RR: 18 (07-15-21 @ 11:39) (18 - 22)  SpO2: 97% (07-15-21 @ 11:39) (92% - 99%)  Wt(kg): --  I&O's Summary    14 Jul 2021 07:01  -  15 Jul 2021 07:00  --------------------------------------------------------  IN: 240 mL / OUT: 400 mL / NET: -160 mL    15 Jul 2021 07:01  -  15 Jul 2021 15:44  --------------------------------------------------------  IN: 297 mL / OUT: 0 mL / NET: 297 mL        Weight (kg): 93.3 (07-15 @ 14:12)    Appearance: In no distress	  HEENT:    PERRL, EOMI	  Cardiovascular:  S1 S2, No JVD  Respiratory: Lungs clear to auscultation	  Gastrointestinal:  Soft, Non-tender, + BS	  Vascularature:  No edema of LE  Psychiatric: Appropriate affect   Neuro: no acute focal deficits                               10.2   7.77  )-----------( 124      ( 15 Jul 2021 05:38 )             34.8     07-15    141  |  96  |  22  ----------------------------<  93  4.4   |  37<H>  |  0.77    Ca    9.5      15 Jul 2021 05:38  Phos  2.4     07-14  Mg     2.2     07-15          Labs personally reviewed  radiology   < from: TTE with Doppler (w/Cont) (07.14.21 @ 14:18) >  Endocardial visualization enhanced with intravenous  injection of Ultrasonic Enhancing Agent (Definity).  Images are suboptimal despite the use of Definity.  Approximate ejection fraction 45-50%; this is a crude  estimation given the image quality.  Suboptimal visualization of the right heart.  Severe pulmonary hypertension.  A device wire is noted in the right heart.            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      Problem/Plan - 1:  ·  Problem: Acute systolic HF  Plan: - previous EF was 10% (2018) w/ AICD   -c/w lasix   - echo show marked improved EF of 40-50% on med therapy    - c/w Entresto, Coreg, Aldactone   - switch to PO Lasix 60mg PO qd      Problem/Plan - 2:  ·  Problem: Atrial fibrillation.  Plan: - hx A.fib  - AURELIO-VASC 5  - on eliquis 2.5 BID      Problem/Plan - 3:  ·  Problem: Acute respiratory failure with hypercapnia.  Plan: - initial pH 7.15 and CO2 > 100 on VBG  - warm and wet   - BIPAP--> AVAPS --> Venturi 50% --> now 2L NC.   -sat well on O2     Problem/Plan - 4:  ·  Problem: HTN   -c/w home meds   -BP controlled      Discharge planning          Katya Cheung FNP-BC   Gamaliel Russell DO [facility]  Cardiovascular Medicine  [address] [place] [address], [address]  Office: [phone]  Cell: [phone]

## 2021-07-15 NOTE — PROGRESS NOTE ADULT - PROBLEM SELECTOR PLAN 1
- initial pH 7.15 and CO2 > 100 on VBG  - warm and wet   - hypercapnia improved on BIPAP, however, patient only pulled tidal volumes of 200-250cc on 12/5, 40% so upgraded to AVAPS with now 400ccs TV  - pH 7.34 with pCO2 76 on evening ABG  - hypercapnia with appropriate metabolic compensation  - BIPAP--> AVAPS --> Venturi 50% --> now 2L NC - initial pH 7.15 and CO2 > 100 on VBG  - warm and wet   - hypercapnia improved on BIPAP, however, patient only pulled tidal volumes of 200-250cc on 12/5, 40% so upgraded to AVAPS with now 400ccs TV  - pH 7.34 with pCO2 76 on evening ABG  - hypercapnia with appropriate metabolic compensation  - BIPAP--> AVAPS --> Venturi 50% --> now comfortable on 2L NC  - Etiology of initial respiratory failure thought to be secondary to acute decompensated heart failure; however, echocardiogram demonstrates recovered function of the left ventricle and significant pulmonary hypertension; patient is not wheezing or coughing; less likely that this patient has severe copd; suspect patient may have obesity-hypoventilation syndrome; discussed case with outpatient pulmonologist today who recommended outpatient evaluation with him; he agreed with us that the patient's carbon dioxide retention is likely to worsen from q24h oxygen administration - initial pH 7.15 and CO2 > 100 on VBG  - warm and wet   - hypercapnia improved on BIPAP, however, patient only pulled tidal volumes of 200-250cc on 12/5, 40% so upgraded to AVAPS with now 400ccs TV  - pH 7.34 with pCO2 76 on evening ABG  - hypercapnia with appropriate metabolic compensation  - BIPAP--> AVAPS --> Venturi 50% --> now comfortable on 2L NC  - Etiology of initial respiratory failure thought to be secondary to acute decompensated heart failure; however, echocardiogram demonstrates recovered function of the left ventricle and significant pulmonary hypertension; patient is not wheezing or coughing; less likely that this patient has severe copd; suspect patient may have obesity-hypoventilation syndrome; discussed case with outpatient pulmonologist today who recommended outpatient evaluation with him; he agreed with us that the patient's carbon dioxide retention is likely to worsen from q24h oxygen administration; although no urgent need for nocturnal positive airway pressure, patient is likely to benefit from outpatient sleep study and from nocturnal positive airway pressure

## 2021-07-15 NOTE — PROGRESS NOTE ADULT - PROBLEM SELECTOR PLAN 3
- nii [email or web address]  - AURELIO-VASC 5, needs AC  - c/w eliquis 2.5 BID  - telemetry - hx A.fib; noted to be paroxysmal on telemetry   - AURELIO-VASC 5, needs AC  - c/w eliquis 2.5 BID

## 2021-07-16 ENCOUNTER — TRANSCRIPTION ENCOUNTER (OUTPATIENT)
Age: 81
End: 2021-07-16

## 2021-07-16 VITALS
DIASTOLIC BLOOD PRESSURE: 65 MMHG | HEART RATE: 62 BPM | SYSTOLIC BLOOD PRESSURE: 110 MMHG | RESPIRATION RATE: 16 BRPM | OXYGEN SATURATION: 95 % | TEMPERATURE: 98 F

## 2021-07-16 LAB
ANION GAP SERPL CALC-SCNC: 8 MMOL/L — SIGNIFICANT CHANGE UP (ref 5–17)
BUN SERPL-MCNC: 26 MG/DL — HIGH (ref 7–23)
CALCIUM SERPL-MCNC: 9.3 MG/DL — SIGNIFICANT CHANGE UP (ref 8.4–10.5)
CHLORIDE SERPL-SCNC: 96 MMOL/L — SIGNIFICANT CHANGE UP (ref 96–108)
CO2 SERPL-SCNC: 34 MMOL/L — HIGH (ref 22–31)
CREAT SERPL-MCNC: 0.86 MG/DL — SIGNIFICANT CHANGE UP (ref 0.5–1.3)
GAS PNL BLDA: SIGNIFICANT CHANGE UP
GLUCOSE SERPL-MCNC: 96 MG/DL — SIGNIFICANT CHANGE UP (ref 70–99)
HCT VFR BLD CALC: 33.1 % — LOW (ref 34.5–45)
HGB BLD-MCNC: 9.6 G/DL — LOW (ref 11.5–15.5)
MAGNESIUM SERPL-MCNC: 2.3 MG/DL — SIGNIFICANT CHANGE UP (ref 1.6–2.6)
MCHC RBC-ENTMCNC: 27.1 PG — SIGNIFICANT CHANGE UP (ref 27–34)
MCHC RBC-ENTMCNC: 29 GM/DL — LOW (ref 32–36)
MCV RBC AUTO: 93.5 FL — SIGNIFICANT CHANGE UP (ref 80–100)
NRBC # BLD: 0 /100 WBCS — SIGNIFICANT CHANGE UP (ref 0–0)
PHOSPHATE SERPL-MCNC: 3.5 MG/DL — SIGNIFICANT CHANGE UP (ref 2.5–4.5)
PLATELET # BLD AUTO: 124 K/UL — LOW (ref 150–400)
POTASSIUM SERPL-MCNC: 4.6 MMOL/L — SIGNIFICANT CHANGE UP (ref 3.5–5.3)
POTASSIUM SERPL-SCNC: 4.6 MMOL/L — SIGNIFICANT CHANGE UP (ref 3.5–5.3)
RBC # BLD: 3.54 M/UL — LOW (ref 3.8–5.2)
RBC # FLD: 14.3 % — SIGNIFICANT CHANGE UP (ref 10.3–14.5)
SODIUM SERPL-SCNC: 138 MMOL/L — SIGNIFICANT CHANGE UP (ref 135–145)
WBC # BLD: 5.92 K/UL — SIGNIFICANT CHANGE UP (ref 3.8–10.5)
WBC # FLD AUTO: 5.92 K/UL — SIGNIFICANT CHANGE UP (ref 3.8–10.5)

## 2021-07-16 PROCEDURE — 85610 PROTHROMBIN TIME: CPT

## 2021-07-16 PROCEDURE — 84295 ASSAY OF SERUM SODIUM: CPT

## 2021-07-16 PROCEDURE — 83605 ASSAY OF LACTIC ACID: CPT

## 2021-07-16 PROCEDURE — 87040 BLOOD CULTURE FOR BACTERIA: CPT

## 2021-07-16 PROCEDURE — 71045 X-RAY EXAM CHEST 1 VIEW: CPT

## 2021-07-16 PROCEDURE — 85025 COMPLETE CBC W/AUTO DIFF WBC: CPT

## 2021-07-16 PROCEDURE — C8929: CPT

## 2021-07-16 PROCEDURE — 85014 HEMATOCRIT: CPT

## 2021-07-16 PROCEDURE — 96368 THER/DIAG CONCURRENT INF: CPT

## 2021-07-16 PROCEDURE — 82330 ASSAY OF CALCIUM: CPT

## 2021-07-16 PROCEDURE — 84132 ASSAY OF SERUM POTASSIUM: CPT

## 2021-07-16 PROCEDURE — 96375 TX/PRO/DX INJ NEW DRUG ADDON: CPT

## 2021-07-16 PROCEDURE — 99239 HOSP IP/OBS DSCHRG MGMT >30: CPT

## 2021-07-16 PROCEDURE — 84100 ASSAY OF PHOSPHORUS: CPT

## 2021-07-16 PROCEDURE — 36600 WITHDRAWAL OF ARTERIAL BLOOD: CPT

## 2021-07-16 PROCEDURE — 94660 CPAP INITIATION&MGMT: CPT

## 2021-07-16 PROCEDURE — 86769 SARS-COV-2 COVID-19 ANTIBODY: CPT

## 2021-07-16 PROCEDURE — 82947 ASSAY GLUCOSE BLOOD QUANT: CPT

## 2021-07-16 PROCEDURE — U0003: CPT

## 2021-07-16 PROCEDURE — 84145 PROCALCITONIN (PCT): CPT

## 2021-07-16 PROCEDURE — 96365 THER/PROPH/DIAG IV INF INIT: CPT

## 2021-07-16 PROCEDURE — 83735 ASSAY OF MAGNESIUM: CPT

## 2021-07-16 PROCEDURE — 85027 COMPLETE CBC AUTOMATED: CPT

## 2021-07-16 PROCEDURE — 85018 HEMOGLOBIN: CPT

## 2021-07-16 PROCEDURE — 85730 THROMBOPLASTIN TIME PARTIAL: CPT

## 2021-07-16 PROCEDURE — 80053 COMPREHEN METABOLIC PANEL: CPT

## 2021-07-16 PROCEDURE — 82565 ASSAY OF CREATININE: CPT

## 2021-07-16 PROCEDURE — U0005: CPT

## 2021-07-16 PROCEDURE — 83880 ASSAY OF NATRIURETIC PEPTIDE: CPT

## 2021-07-16 PROCEDURE — 99291 CRITICAL CARE FIRST HOUR: CPT | Mod: 25

## 2021-07-16 PROCEDURE — 82435 ASSAY OF BLOOD CHLORIDE: CPT

## 2021-07-16 PROCEDURE — 82803 BLOOD GASES ANY COMBINATION: CPT

## 2021-07-16 PROCEDURE — 84484 ASSAY OF TROPONIN QUANT: CPT

## 2021-07-16 PROCEDURE — 80048 BASIC METABOLIC PNL TOTAL CA: CPT

## 2021-07-16 PROCEDURE — 97161 PT EVAL LOW COMPLEX 20 MIN: CPT

## 2021-07-16 RX ORDER — FUROSEMIDE 40 MG
3 TABLET ORAL
Qty: 0 | Refills: 0 | DISCHARGE
Start: 2021-07-16

## 2021-07-16 RX ORDER — FUROSEMIDE 40 MG
1 TABLET ORAL
Qty: 0 | Refills: 0 | DISCHARGE
Start: 2021-07-16

## 2021-07-16 RX ADMIN — APIXABAN 2.5 MILLIGRAM(S): 2.5 TABLET, FILM COATED ORAL at 04:41

## 2021-07-16 RX ADMIN — CARVEDILOL PHOSPHATE 6.25 MILLIGRAM(S): 80 CAPSULE, EXTENDED RELEASE ORAL at 04:42

## 2021-07-16 RX ADMIN — Medication 60 MILLIGRAM(S): at 04:43

## 2021-07-16 RX ADMIN — SACUBITRIL AND VALSARTAN 1 TABLET(S): 24; 26 TABLET, FILM COATED ORAL at 04:41

## 2021-07-16 RX ADMIN — SPIRONOLACTONE 25 MILLIGRAM(S): 25 TABLET, FILM COATED ORAL at 04:41

## 2021-07-16 NOTE — PROGRESS NOTE ADULT - PROBLEM SELECTOR PLAN 4
- due for replacement Friday  - EP to interrogate  - spoke to Janie, the PA of the patient's electrophysiologist at [facility] Dr. Au. Pt had AICD interrogated July 8 which showed 3 months of battery left. The pt is scheduled for battery change on July 23 and per Janie, no indication to change the AICD inpatient. Spoke to inpatient EP NP, no indication to intervene inpatient.

## 2021-07-16 NOTE — PROGRESS NOTE ADULT - PROBLEM SELECTOR PLAN 5
Diet- NPO, DASH when off bipap   Dispo- home  DVT- eliquis 2.5BID  GOC- DNR/DNI as per documentation from micu consult; clinically stable at this time and will readdress goals of care with family in am Diet- NPO, DASH when off bipap   DVT- eliquis 2.5BID  GOC- DNR/DNI as per documentation from micu consult; clinically stable at this time and will readdress goals of care with family in am  Physical therapy: Pt needs home PT per recs       Due to COPD with home O2 use with nasal canula, CHF, pulmonary fibrosis,  patient has a severe mobility limitation and requires a transport wheelchair to assist in daily ADLS. Patient cannot ambulate safely with a cane or a walker. Patient does not have sufficient upper body strength to self propel a standard wheelchair. Patient has a caregiver to assist with maneuvering the wheelchair. Pt has not expressed an unwillingness to use the wheelchair. Patient has ample room in the home for said wheelchair. Use of a transport wheelchair will significantly improve the patient's ability to participate in daily ADL's and the patient will use it on a regular basis in the home. Diet- NPO, DASH when off bipap   DVT- eliquis 2.5BID  GOC- DNR/DNI as per documentation from micu consult; clinically stable at this time and will readdress goals of care with family in am  Physical therapy:  home PT and home with assist       Due to COPD with home O2 use with nasal canula, CHF, pulmonary fibrosis,  patient has a severe mobility limitation and requires a transport wheelchair to assist in daily ADLS. Patient cannot ambulate safely with a cane or a walker. Patient does not have sufficient upper body strength to self propel a standard wheelchair. Patient has a caregiver to assist with maneuvering the wheelchair. Pt has not expressed an unwillingness to use the wheelchair. Patient has ample room in the home for said wheelchair. Use of a transport wheelchair will significantly improve the patient's ability to participate in daily ADL's and the patient will use it on a regular basis in the home.

## 2021-07-16 NOTE — DISCHARGE NOTE NURSING/CASE MANAGEMENT/SOCIAL WORK - NSDCFUADDAPPT_GEN_ALL_CORE_FT
Please make an appointment with your primary care doctor Dr. Cehney within 2 weeks for management of medical conditions.    Please make an appointment with your Pulmonologist Dr. Arciniega within one week to further workup your oxygen requirement.    Please keep your appointment in August with Dr. Au to replace the batteries to your AICD device.     Please make an appointment with your cardiologist Dr. Russell to follow up on your heart functio.

## 2021-07-16 NOTE — PROGRESS NOTE ADULT - SUBJECTIVE AND OBJECTIVE BOX
INTERVAL HPI/OVERNIGHT EVENTS: The patient was transitioned off of her bilevel positive airway pressure machine and had persistent diuresis of fluid.     SUBJECTIVE: Patient seen and examined at bedside. Doing well. Denies Sb, CP.     CONSTITUTIONAL: No weakness, fevers, or chills  EYES/ENT: No visual changes; no dizziness    NECK: No pain, no stiffness  RESPIRATORY: No cough, no shortness of breath  CARDIOVASCULAR: No chest pain, no palpitations  GASTROINTESTINAL: No abdominal or epigastric pain. No nausea, vomiting, or hematemesis; No diarrhea or constipation. No melena or hematochezia.  GENITOURINARY: No dysuria, frequency, or hematuria  NEUROLOGICAL: No numbness, no weakness  SKIN: No itching, no rashes    OBJECTIVE:    VITAL SIGNS:  T(C): 36.7 (15 Jul 2021 04:35), Max: 36.8 (14 Jul 2021 20:59)  T(F): 98.1 (15 Jul 2021 04:35), Max: 98.3 (14 Jul 2021 20:59)  HR: 66 (15 Jul 2021 06:05) (60 - 78)  BP: 138/74 (15 Jul 2021 04:35) (112/62 - 138/74)  BP(mean): --  ABP: --  ABP(mean): --  RR: 18 (15 Jul 2021 04:35) (18 - 24)  SpO2: 95% (15 Jul 2021 06:05) (92% - 100%)        07-14 @ 07:01  -  07-15 @ 07:00  --------------------------------------------------------  IN: 240 mL / OUT: 400 mL / NET: -160 mL      CAPILLARY BLOOD GLUCOSE          PHYSICAL EXAM:    General: NAD; awake and alert; completing full sentences; not using accessory muscles of respiration   HEENT: PERRL grossly, clear conjunctiva  Neck: No jvd, no lymphadenopathy  Respiratory: CTA b/l, rales at bilateral lung bases, no wheezes; equal respiratory excursion   Cardiovascular: +S1/S2; RRR  Abdomen: soft, NT/ND; +BS x4  Extremities: WWP, 2+ peripheral pulses b/l; no LE edema  Skin: normal color and turgor; no rash  Neurological: No gross motor or sensory deficits; coordination intact     MEDICATIONS:  MEDICATIONS  (STANDING):  apixaban 2.5 milliGRAM(s) Oral two times a day  carvedilol 6.25 milliGRAM(s) Oral every 12 hours  furosemide   Injectable 40 milliGRAM(s) IV Push two times a day  sacubitril 24 mG/valsartan 26 mG 1 Tablet(s) Oral two times a day  spironolactone 25 milliGRAM(s) Oral daily    MEDICATIONS  (PRN):      ALLERGIES:  Allergies    amiodarone (Rash)  Augmentin (Rash)  Biaxin (Rash)  Cipro (Rash)  codeine (Rash)  latex (Unknown)  Levaquin (Rash)  morphine (Rash)  statins (Rash)    Intolerances        LABS:                        10.2   7.77  )-----------( 124      ( 15 Jul 2021 05:38 )             34.8     07-15    141  |  96  |  22  ----------------------------<  93  4.4   |  37<H>  |  0.77    Ca    9.5      15 Jul 2021 05:38  Phos  2.4     07-14  Mg     2.2     07-15    TPro  7.5  /  Alb  4.0  /  TBili  0.3  /  DBili  x   /  AST  26  /  ALT  29  /  AlkPhos  67  07-13    PT/INR - ( 13 Jul 2021 11:51 )   PT: 13.1 sec;   INR: 1.10 ratio         PTT - ( 13 Jul 2021 11:51 )  PTT:27.3 sec      RADIOLOGY & ADDITIONAL TESTS: Reviewed. INTERVAL HPI/OVERNIGHT EVENTS: The patient was transitioned off of her bilevel positive airway pressure machine and had persistent diuresis of fluid.     SUBJECTIVE: Patient seen and examined at bedside. Doing well. Denies Sb, CP. Trial off O2 showed pt saturating in the 90-91% with lowest at 89%.      CONSTITUTIONAL: No weakness, fevers, or chills  EYES/ENT: No visual changes; no dizziness    NECK: No pain, no stiffness  RESPIRATORY: No cough, no shortness of breath  CARDIOVASCULAR: No chest pain, no palpitations  GASTROINTESTINAL: No abdominal or epigastric pain. No nausea, vomiting, or hematemesis; No diarrhea or constipation. No melena or hematochezia.  GENITOURINARY: No dysuria, frequency, or hematuria  NEUROLOGICAL: No numbness, no weakness  SKIN: No itching, no rashes    OBJECTIVE:    VITAL SIGNS:  T(C): 36.7 (15 Jul 2021 04:35), Max: 36.8 (14 Jul 2021 20:59)  T(F): 98.1 (15 Jul 2021 04:35), Max: 98.3 (14 Jul 2021 20:59)  HR: 66 (15 Jul 2021 06:05) (60 - 78)  BP: 138/74 (15 Jul 2021 04:35) (112/62 - 138/74)  BP(mean): --  ABP: --  ABP(mean): --  RR: 18 (15 Jul 2021 04:35) (18 - 24)  SpO2: 95% (15 Jul 2021 06:05) (92% - 100%)        07-14 @ 07:01  -  07-15 @ 07:00  --------------------------------------------------------  IN: 240 mL / OUT: 400 mL / NET: -160 mL      CAPILLARY BLOOD GLUCOSE          PHYSICAL EXAM:    General: NAD; awake and alert; completing full sentences; not using accessory muscles of respiration   HEENT: PERRL grossly, clear conjunctiva  Neck: No jvd, no lymphadenopathy  Respiratory: CTA b/l, rales at bilateral lung bases, no wheezes; equal respiratory excursion   Cardiovascular: +S1/S2; RRR  Abdomen: soft, NT/ND; +BS x4  Extremities: WWP, 2+ peripheral pulses b/l; no LE edema  Skin: normal color and turgor; no rash  Neurological: No gross motor or sensory deficits; coordination intact     MEDICATIONS:  MEDICATIONS  (STANDING):  apixaban 2.5 milliGRAM(s) Oral two times a day  carvedilol 6.25 milliGRAM(s) Oral every 12 hours  furosemide   Injectable 40 milliGRAM(s) IV Push two times a day  sacubitril 24 mG/valsartan 26 mG 1 Tablet(s) Oral two times a day  spironolactone 25 milliGRAM(s) Oral daily    MEDICATIONS  (PRN):      ALLERGIES:  Allergies    amiodarone (Rash)  Augmentin (Rash)  Biaxin (Rash)  Cipro (Rash)  codeine (Rash)  latex (Unknown)  Levaquin (Rash)  morphine (Rash)  statins (Rash)    Intolerances        LABS:                        10.2   7.77  )-----------( 124      ( 15 Jul 2021 05:38 )             34.8     07-15    141  |  96  |  22  ----------------------------<  93  4.4   |  37<H>  |  0.77    Ca    9.5      15 Jul 2021 05:38  Phos  2.4     07-14  Mg     2.2     07-15    TPro  7.5  /  Alb  4.0  /  TBili  0.3  /  DBili  x   /  AST  26  /  ALT  29  /  AlkPhos  67  07-13    PT/INR - ( 13 Jul 2021 11:51 )   PT: 13.1 sec;   INR: 1.10 ratio         PTT - ( 13 Jul 2021 11:51 )  PTT:27.3 sec      RADIOLOGY & ADDITIONAL TESTS: Reviewed.

## 2021-07-16 NOTE — PROGRESS NOTE ADULT - PROBLEM SELECTOR PROBLEM 1
Acute respiratory failure with hypercapnia

## 2021-07-16 NOTE — PROVIDER CONTACT NOTE (CRITICAL VALUE NOTIFICATION) - NAME OF MD/NP/PA/DO NOTIFIED:
Dr Dickson
Dr. Arnold (MAR provider) Team 2 Resident at pager [phone] in a RRT and unable to answer phone.

## 2021-07-16 NOTE — PROGRESS NOTE ADULT - ASSESSMENT
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

## 2021-07-16 NOTE — PROGRESS NOTE ADULT - SUBJECTIVE AND OBJECTIVE BOX
Patient is a 81y old  Female who presents with a chief complaint of Acute on chronic hypercapnic resp failure (14 Jul 2021 14:23)      INTERVAL HISTORY: pt reports feeling better    TELEMETRY Personally reviewed:    REVIEW OF SYSTEMS:   CONSTITUTIONAL: No weakness  EYES/ENT: No visual changes; No throat pain  Neck: No pain or stiffness  Respiratory: No cough, wheezing, No shortness of breath  CARDIOVASCULAR: no chest pain or palpitations  GASTROINTESTINAL: No abdominal pain, no nausea, vomiting or hematemesis  GENITOURINARY: No dysuria, frequency or hematuria  NEUROLOGICAL: No stroke like symptoms  SKIN: No rashes    	  MEDICATIONS:  carvedilol 6.25 milliGRAM(s) Oral every 12 hours  furosemide    Tablet 60 milliGRAM(s) Oral daily  sacubitril 24 mG/valsartan 26 mG 1 Tablet(s) Oral two times a day  spironolactone 25 milliGRAM(s) Oral daily        PHYSICAL EXAM:  T(C): 36.8 (07-16-21 @ 12:36), Max: 37.1 (07-15-21 @ 20:52)  HR: 62 (07-16-21 @ 12:36) (61 - 70)  BP: 110/65 (07-16-21 @ 12:36) (110/65 - 129/73)  RR: 16 (07-16-21 @ 12:36) (16 - 18)  SpO2: 95% (07-16-21 @ 12:36) (95% - 98%)  Wt(kg): --  I&O's Summary    15 Jul 2021 07:01  -  16 Jul 2021 07:00  --------------------------------------------------------  IN: 477 mL / OUT: 300 mL / NET: 177 mL    16 Jul 2021 07:01  -  16 Jul 2021 15:32  --------------------------------------------------------  IN: 0 mL / OUT: 300 mL / NET: -300 mL        Weight (kg): 85.6 (07-16 @ 08:50)    Appearance: In no distress	  HEENT:    PERRL, EOMI	  Cardiovascular:  S1 S2, No JVD  Respiratory: Lungs clear to auscultation	2L nc  Gastrointestinal:  Soft, Non-tender, + BS	  Vascularature: + edema of LE  Psychiatric: Appropriate affect   Neuro: no acute focal deficits                               9.6    5.92  )-----------( 124      ( 16 Jul 2021 11:18 )             33.1     07-16    138  |  96  |  26<H>  ----------------------------<  96  4.6   |  34<H>  |  0.86    Ca    9.3      16 Jul 2021 05:55  Phos  3.5     07-16  Mg     2.3     07-16          Labs personally reviewed      ASSESSMENT/PLAN: 	    81 year old F PMH chronic systolic biventricular CHF w/ EF 10% (04/2018), COPD on 2L NC at home, 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      Problem/Plan - 1:  ·  Problem: Acute systolic HF  Plan: - previous EF was 10% (2018) w/ AICD   -c/w lasix   - echo show marked improved EF of 40-50% on med therapy    - c/w Entresto, Coreg, Aldactone   - switch to PO Lasix 60mg PO qd  -Will most likely be d/c home today    Problem/Plan - 2:  ·  Problem: Atrial fibrillation.  Plan: - hx A.fib  - AURELIO-VASC 5  - on eliquis 2.5 BID      Problem/Plan - 3:  ·  Problem: Acute respiratory failure with hypercapnia.  Plan: - initial pH 7.15 and CO2 > 100 on VBG  - warm and wet   - BIPAP--> AVAPS --> Venturi 50% --> now 2L NC.   -sat well on O2 2L    Problem/Plan - 4:  ·  Problem: HTN   -c/w home meds   -BP controlled        Katya Cheung FNP-KEVIN Russell DO [facility]  Cardiovascular Medicine  [address] [place] [address], [address]  Office: [phone]  Cell: [phone]

## 2021-07-16 NOTE — PROGRESS NOTE ADULT - ATTENDING COMMENTS
Pt care and plan discussed and reviewed with NP. Plan as outlined above edited by me to reflect our discussion.
Patient seen and examined, case d/w house staff.  a/w acute on chronic systolic heart failure with acute hypercapnic respiratory failure, now resolved.  EF much improved from prior reports.  stable on home Lasix dosing.  advised to f/u with pulmonary, consider CPAP/BiPAP at home (patient was on in the past but unable to tolerate.  d/c today  total d/c time 45 minutes.
Patient seen and examined, case d/w house staff.  Acute hypercarbic respiratory failure from acute systolic heart failure exacerbation.  Both have resolved.  Can d/c BiPAP, O2 as needed, normally on 2 Liters  change to home Lasix dosing, 60 mg po daily  d/c planning
Patient seen and examined, case d/w house staff.  Acute hypercarbic respiratory failure from acute systolic heart failure exacerbation.  Improving overnight.  Can d/c BiPAP, O2 as needed, normally on 2 Liters  continue Lasix 40 mg IVP BID

## 2021-07-16 NOTE — PROGRESS NOTE ADULT - PROBLEM SELECTOR PLAN 1
- initial pH 7.15 and CO2 > 100 on VBG  - warm and wet   - hypercapnia improved on BIPAP, however, patient only pulled tidal volumes of 200-250cc on 12/5, 40% so upgraded to AVAPS with now 400ccs TV  - pH 7.34 with pCO2 76 on evening ABG  - hypercapnia with appropriate metabolic compensation  - BIPAP--> AVAPS --> Venturi 50% --> now comfortable on 2L NC  - Etiology of initial respiratory failure thought to be secondary to acute decompensated heart failure; however, echocardiogram demonstrates recovered function of the left ventricle and significant pulmonary hypertension; patient is not wheezing or coughing; less likely that this patient has severe copd; suspect patient may have obesity-hypoventilation syndrome; discussed case with outpatient pulmonologist today who recommended outpatient evaluation with him; he agreed with us that the patient's carbon dioxide retention is likely to worsen from q24h oxygen administration; although no urgent need for nocturnal positive airway pressure, patient is likely to benefit from outpatient sleep study and from nocturnal positive airway pressure

## 2021-07-16 NOTE — DISCHARGE NOTE NURSING/CASE MANAGEMENT/SOCIAL WORK - PATIENT PORTAL LINK FT
You can access the FollowMyHealth Patient Portal offered by [facility] by registering at the following website: [email or web address]. By joining [employer]’s FollowMyHealth portal, you will also be able to view your health information using other applications (apps) compatible with our system.

## 2021-07-16 NOTE — PROGRESS NOTE ADULT - PROBLEM SELECTOR PLAN 2
- CHF w/ EF 25% (2018) w/ AICD   - s/p 60mg IV lasix ED  - c/w home carvedilol, entresto, spironolactone  - lasix to 40mg IV BID; will transition today to 60 mg of oral furosemide daily    - strict IN/OUTs  - echocardiogram today demonstrates significantly recovered ejection fraction (45%); wonder if patient's initial episode represented not acute decompensated left-sided heart failure, but rather obesity-hypoventilation syndrome

## 2021-07-18 ENCOUNTER — NON-APPOINTMENT (OUTPATIENT)
Age: 81
End: 2021-07-18

## 2021-07-18 LAB
CULTURE RESULTS: SIGNIFICANT CHANGE UP
CULTURE RESULTS: SIGNIFICANT CHANGE UP
SPECIMEN SOURCE: SIGNIFICANT CHANGE UP
SPECIMEN SOURCE: SIGNIFICANT CHANGE UP

## 2021-07-20 ENCOUNTER — NON-APPOINTMENT (OUTPATIENT)
Age: 81
End: 2021-07-20

## 2021-07-21 ENCOUNTER — APPOINTMENT (OUTPATIENT)
Dept: PULMONOLOGY | Facility: CLINIC | Age: 81
End: 2021-07-21
Payer: MEDICARE

## 2021-07-21 DIAGNOSIS — E66.2 MORBID (SEVERE) OBESITY WITH ALVEOLAR HYPOVENTILATION: ICD-10-CM

## 2021-07-21 PROCEDURE — 99214 OFFICE O/P EST MOD 30 MIN: CPT | Mod: 95

## 2021-07-21 RX ORDER — FUROSEMIDE 20 MG/1
20 TABLET ORAL
Refills: 0 | Status: ACTIVE | COMMUNITY

## 2021-07-21 RX ORDER — MUPIROCIN 20 MG/G
2 OINTMENT TOPICAL TWICE DAILY
Qty: 1 | Refills: 2 | Status: DISCONTINUED | COMMUNITY
Start: 2021-04-20 | End: 2021-07-21

## 2021-07-21 RX ORDER — SILVER 2" X 2"
0.9 BANDAGE TOPICAL
Qty: 40 | Refills: 3 | Status: DISCONTINUED | COMMUNITY
Start: 2018-07-17 | End: 2021-07-21

## 2021-07-22 ENCOUNTER — TRANSCRIPTION ENCOUNTER (OUTPATIENT)
Age: 81
End: 2021-07-22

## 2021-07-26 ENCOUNTER — NON-APPOINTMENT (OUTPATIENT)
Age: 81
End: 2021-07-26

## 2021-08-10 ENCOUNTER — NON-APPOINTMENT (OUTPATIENT)
Age: 81
End: 2021-08-10

## 2021-08-10 ENCOUNTER — APPOINTMENT (OUTPATIENT)
Dept: PULMONOLOGY | Facility: CLINIC | Age: 81
End: 2021-08-10

## 2021-11-04 ENCOUNTER — MED ADMIN CHARGE (OUTPATIENT)
Age: 81
End: 2021-11-04

## 2021-11-04 ENCOUNTER — APPOINTMENT (OUTPATIENT)
Dept: INTERNAL MEDICINE | Facility: CLINIC | Age: 81
End: 2021-11-04
Payer: MEDICARE

## 2021-11-04 VITALS
OXYGEN SATURATION: 96 % | RESPIRATION RATE: 12 BRPM | SYSTOLIC BLOOD PRESSURE: 119 MMHG | DIASTOLIC BLOOD PRESSURE: 72 MMHG | TEMPERATURE: 97.7 F | HEART RATE: 51 BPM | HEIGHT: 59 IN

## 2021-11-04 DIAGNOSIS — Z23 ENCOUNTER FOR IMMUNIZATION: ICD-10-CM

## 2021-11-04 DIAGNOSIS — I10 ESSENTIAL (PRIMARY) HYPERTENSION: ICD-10-CM

## 2021-11-04 DIAGNOSIS — Z00.00 ENCOUNTER FOR GENERAL ADULT MEDICAL EXAMINATION W/OUT ABNORMAL FINDINGS: ICD-10-CM

## 2021-11-04 PROCEDURE — 99397 PER PM REEVAL EST PAT 65+ YR: CPT | Mod: 25,GY

## 2021-11-04 PROCEDURE — 90662 IIV NO PRSV INCREASED AG IM: CPT

## 2021-11-04 PROCEDURE — G0008: CPT

## 2021-11-05 ENCOUNTER — APPOINTMENT (OUTPATIENT)
Dept: PULMONOLOGY | Facility: CLINIC | Age: 81
End: 2021-11-05
Payer: MEDICARE

## 2021-11-05 VITALS
TEMPERATURE: 98 F | HEART RATE: 62 BPM | OXYGEN SATURATION: 96 % | SYSTOLIC BLOOD PRESSURE: 148 MMHG | DIASTOLIC BLOOD PRESSURE: 80 MMHG

## 2021-11-05 PROCEDURE — 99214 OFFICE O/P EST MOD 30 MIN: CPT

## 2021-11-07 NOTE — PROCEDURE
[FreeTextEntry1] : CT Chest 6/23/17: Small bilateral pleural effusions. There was cardiomegaly. No evidence of any pulmonary nodules noted. No adenopathy. ICD in place. \par \par ABG 6/14/18: 7.47/42/185 on O2 at 3 LPM. \par \par ABG 7/13/21: 7.15/>104/172. 7.34/76/67. \par \par ABG 7/14/21: 7.38/69/77.\par \par ABG 7/15/21: 7.37/73/74.\par \par PFT 2/10/17: There was mild obstruction. There was mild restriction. Diffusion was moderately reduced. No significant change noted after inhalation of bronchodilator.\par

## 2021-11-07 NOTE — PHYSICAL EXAM
[General Appearance - In No Acute Distress] : no acute distress [Neck Cervical Mass (___cm)] : no neck mass was observed [Heart Sounds] : normal S1 and S2 [Auscultation Breath Sounds / Voice Sounds] : lungs were clear to auscultation bilaterally [Cyanosis, Localized] : no localized cyanosis [1+ Pitting] : 1+  pitting [Skin Turgor] : normal skin turgor [No Focal Deficits] : no focal deficits [Oriented To Time, Place, And Person] : oriented to person, place, and time [Abdomen Tenderness] : non-tender [Impaired Insight] : insight and judgment were intact

## 2021-11-07 NOTE — HISTORY OF PRESENT ILLNESS
[de-identified] : ILAN JAIMES 81 yea old female with history of Afib, CHF, CAD, HTN, AICD, lung fibrosis presents today for physical exam \par \par She is currently on 2 LNC at home\par She reports that in July she was hospitalized for exacerbation of CHF. \par She reports having increased fatigue over the last couple of months \par She follows with cardiology Dr. Ede Florez next appt next week \par She admits she's not as active as she was prior to COVID \par Denies CP, palpations, N/V or abdominal pain

## 2021-11-07 NOTE — PHYSICAL EXAM
[No Acute Distress] : no acute distress [Well Nourished] : well nourished [Normal Sclera/Conjunctiva] : normal sclera/conjunctiva [EOMI] : extraocular movements intact [Normal Outer Ear/Nose] : the outer ears and nose were normal in appearance [Normal Oropharynx] : the oropharynx was normal [No JVD] : no jugular venous distention [No Lymphadenopathy] : no lymphadenopathy [Supple] : supple [Thyroid Normal, No Nodules] : the thyroid was normal and there were no nodules present [No Respiratory Distress] : no respiratory distress  [No Accessory Muscle Use] : no accessory muscle use [Normal Rate] : normal rate  [Regular Rhythm] : with a regular rhythm [Normal S1, S2] : normal S1 and S2 [No Murmur] : no murmur heard [Pedal Pulses Present] : the pedal pulses are present [Soft] : abdomen soft [Non Tender] : non-tender [Non-distended] : non-distended [Normal Bowel Sounds] : normal bowel sounds [Normal Posterior Cervical Nodes] : no posterior cervical lymphadenopathy [Normal Anterior Cervical Nodes] : no anterior cervical lymphadenopathy [No CVA Tenderness] : no CVA  tenderness [No Spinal Tenderness] : no spinal tenderness [No Joint Swelling] : no joint swelling [Grossly Normal Strength/Tone] : grossly normal strength/tone [No Rash] : no rash [Coordination Grossly Intact] : coordination grossly intact [No Focal Deficits] : no focal deficits [Normal Gait] : normal gait [Normal Affect] : the affect was normal [Normal Insight/Judgement] : insight and judgment were intact [de-identified] : right ear cerumen impaction  [de-identified] : Diminished at bases, on 2 LNC [de-identified] : + varicosities, + trace LE edema [de-identified] : LE hyperpigmentation

## 2021-11-07 NOTE — REVIEW OF SYSTEMS
[Fatigue] : fatigue [Negative] : Heme/Lymph [Shortness Of Breath] : shortness of breath [FreeTextEntry6] : history of pulmonary fibrosis on 2 LNC

## 2021-11-07 NOTE — DISCUSSION/SUMMARY
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 She was hospitalized for CHF in July 2021. Placed on bilevel for hypercapnia. Also on supplemental oxygen. \par \par C/O weakness. Probably multifactorial. Saw her PCP blood work down. To follow up with her. For her ILD will get CT Chest. Advised to use bilevel at night as requested. To continue with oxygen 24 hours per day. Follow up with Cardiology and EPS. \par \par Follow up in 6 months. Sooner if necessary. \par

## 2021-11-07 NOTE — PLAN
[FreeTextEntry1] : Physical annual\par see plan \par \par Flu shot today\par \par 1. History of pulmonary fibrosis\par cont 2 LNC\par Following with pulmonary Dr. Arciniega next appt tomorrow \par \par 2. HTN/CAD/CHF\par Low sodium, low fat diet, increased physical activity \par following with cardiology\par cont current medications \par \par Labs ordered pt to f/u for results

## 2021-11-07 NOTE — REVIEW OF SYSTEMS
[Dyspnea] : dyspnea [Fatigue] : fatigue [Edema] : ~T edema was present [Poor Appetite] : normal appetite  [Wheezing] : no wheezing [Chest Discomfort] : no chest discomfort [Nasal Discharge] : no nasal discharge [Heartburn] : no heartburn [Back Pain] : ~T no back pain [Rash] : no [unfilled] rash [Anemia] : no anemia [Headache] : no headache [Depression] : no depression [Diabetes] : no diabetes mellitus [Thyroid Problem] : no thyroid problem [DVT] : no DVT [Difficulty Initiating Sleep] : no difficulty falling asleep [Snoring] : no snoring

## 2021-11-07 NOTE — HISTORY OF PRESENT ILLNESS
[Former] : former [TextBox_4] : She was hospitalized for CHF in July 2021. ABG showed respiratory acidosis. Was placed on bilevel. She was advised to sleep with it every night but she stated that she is unable to do this. Uses it on the couch for a few hours when she is watching TV. \par \par Has been feeling weak for the past few months. Uses oxygen 24 hours per day. \par \par \par \par \par \par  [Awakes Unrefreshed] : does not awaken unrefreshed [Daytime Somnolence] : denies daytime somnolence [Difficulty Initiating Sleep] : does not have difficulty initiating sleep

## 2021-11-10 LAB
25(OH)D3 SERPL-MCNC: 34.2 NG/ML
ALBUMIN SERPL ELPH-MCNC: 4.1 G/DL
ALP BLD-CCNC: 69 U/L
ALT SERPL-CCNC: 11 U/L
ANION GAP SERPL CALC-SCNC: 13 MMOL/L
APPEARANCE: CLEAR
AST SERPL-CCNC: 13 U/L
BASOPHILS # BLD AUTO: 0.02 K/UL
BASOPHILS NFR BLD AUTO: 0.4 %
BILIRUB SERPL-MCNC: 0.3 MG/DL
BILIRUBIN URINE: NEGATIVE
BLOOD URINE: NEGATIVE
BUN SERPL-MCNC: 25 MG/DL
CALCIUM SERPL-MCNC: 9.6 MG/DL
CHLORIDE SERPL-SCNC: 92 MMOL/L
CHOLEST SERPL-MCNC: 163 MG/DL
CO2 SERPL-SCNC: 34 MMOL/L
COLOR: NORMAL
COVID-19 SPIKE DOMAIN ANTIBODY INTERPRETATION: POSITIVE
CREAT SERPL-MCNC: 0.95 MG/DL
EOSINOPHIL # BLD AUTO: 0.12 K/UL
EOSINOPHIL NFR BLD AUTO: 2.5 %
ESTIMATED AVERAGE GLUCOSE: 111 MG/DL
GLUCOSE QUALITATIVE U: NEGATIVE
GLUCOSE SERPL-MCNC: 77 MG/DL
HBA1C MFR BLD HPLC: 5.5 %
HCT VFR BLD CALC: 38 %
HDLC SERPL-MCNC: 52 MG/DL
HGB BLD-MCNC: 10.7 G/DL
IMM GRANULOCYTES NFR BLD AUTO: 0.4 %
KETONES URINE: NEGATIVE
LDLC SERPL CALC-MCNC: 96 MG/DL
LEUKOCYTE ESTERASE URINE: NEGATIVE
LYMPHOCYTES # BLD AUTO: 0.93 K/UL
LYMPHOCYTES NFR BLD AUTO: 19.1 %
MAN DIFF?: NORMAL
MCHC RBC-ENTMCNC: 25.2 PG
MCHC RBC-ENTMCNC: 28.2 GM/DL
MCV RBC AUTO: 89.6 FL
MONOCYTES # BLD AUTO: 0.39 K/UL
MONOCYTES NFR BLD AUTO: 8 %
NEUTROPHILS # BLD AUTO: 3.4 K/UL
NEUTROPHILS NFR BLD AUTO: 69.6 %
NITRITE URINE: NEGATIVE
NONHDLC SERPL-MCNC: 111 MG/DL
PH URINE: 7
PLATELET # BLD AUTO: 114 K/UL
POTASSIUM SERPL-SCNC: 5.2 MMOL/L
PROT SERPL-MCNC: 7.3 G/DL
PROTEIN URINE: NEGATIVE
RBC # BLD: 4.24 M/UL
RBC # FLD: 14.8 %
SARS-COV-2 AB SERPL IA-ACNC: >250 U/ML
SODIUM SERPL-SCNC: 139 MMOL/L
SPECIFIC GRAVITY URINE: 1
TRIGL SERPL-MCNC: 75 MG/DL
TSH SERPL-ACNC: 1.22 UIU/ML
UROBILINOGEN URINE: NORMAL
VIT B12 SERPL-MCNC: 609 PG/ML
WBC # FLD AUTO: 4.88 K/UL

## 2021-11-11 ENCOUNTER — OUTPATIENT (OUTPATIENT)
Dept: OUTPATIENT SERVICES | Facility: HOSPITAL | Age: 81
LOS: 1 days | End: 2021-11-11
Payer: MEDICARE

## 2021-11-11 ENCOUNTER — APPOINTMENT (OUTPATIENT)
Dept: CT IMAGING | Facility: IMAGING CENTER | Age: 81
End: 2021-11-11
Payer: MEDICARE

## 2021-11-11 DIAGNOSIS — Z90.710 ACQUIRED ABSENCE OF BOTH CERVIX AND UTERUS: Chronic | ICD-10-CM

## 2021-11-11 DIAGNOSIS — J84.9 INTERSTITIAL PULMONARY DISEASE, UNSPECIFIED: ICD-10-CM

## 2021-11-11 DIAGNOSIS — Z98.89 OTHER SPECIFIED POSTPROCEDURAL STATES: Chronic | ICD-10-CM

## 2021-11-11 PROCEDURE — 71250 CT THORAX DX C-: CPT | Mod: 26,ME

## 2021-11-11 PROCEDURE — G1004: CPT

## 2021-11-11 PROCEDURE — 71250 CT THORAX DX C-: CPT | Mod: ME

## 2021-11-16 ENCOUNTER — NON-APPOINTMENT (OUTPATIENT)
Age: 81
End: 2021-11-16

## 2021-11-16 LAB
FERRITIN SERPL-MCNC: 75 NG/ML
IRON SATN MFR SERPL: 9 %
IRON SERPL-MCNC: 39 UG/DL
TIBC SERPL-MCNC: 447 UG/DL
UIBC SERPL-MCNC: 408 UG/DL

## 2021-12-06 ENCOUNTER — APPOINTMENT (OUTPATIENT)
Dept: PULMONOLOGY | Facility: CLINIC | Age: 81
End: 2021-12-06

## 2021-12-22 ENCOUNTER — NON-APPOINTMENT (OUTPATIENT)
Age: 81
End: 2021-12-22

## 2022-04-11 PROBLEM — Z11.59 SCREENING FOR VIRAL DISEASE: Status: ACTIVE | Noted: 2020-06-01

## 2022-06-07 NOTE — PHYSICAL THERAPY INITIAL EVALUATION ADULT - IMPAIRMENTS FOUND, PT EVAL
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 No overnight events.  The patient continues to do fairly well on the unit.  She is pleasant on approach, and denies any problems.  No agitation or aggression towards peers. Eating and sleeping well. VSS.  Yells out on occasion, but in no distress.  She remains disorganized, but able to answer some questions.  She has been compliant with medications, tolerating them well. aerobic capacity/endurance/gait, locomotion, and balance/gross motor/muscle strength

## 2022-07-09 ENCOUNTER — EMERGENCY (EMERGENCY)
Facility: HOSPITAL | Age: 82
LOS: 1 days | Discharge: HOME | End: 2022-07-09
Attending: EMERGENCY MEDICINE | Admitting: EMERGENCY MEDICINE
Payer: MEDICARE

## 2022-07-09 VITALS
OXYGEN SATURATION: 96 % | TEMPERATURE: 98 F | DIASTOLIC BLOOD PRESSURE: 81 MMHG | HEIGHT: 60 IN | RESPIRATION RATE: 18 BRPM | WEIGHT: 179.9 LBS | SYSTOLIC BLOOD PRESSURE: 126 MMHG | HEART RATE: 52 BPM

## 2022-07-09 VITALS
HEART RATE: 57 BPM | DIASTOLIC BLOOD PRESSURE: 62 MMHG | RESPIRATION RATE: 17 BRPM | SYSTOLIC BLOOD PRESSURE: 129 MMHG | OXYGEN SATURATION: 99 %

## 2022-07-09 DIAGNOSIS — Z90.710 ACQUIRED ABSENCE OF BOTH CERVIX AND UTERUS: Chronic | ICD-10-CM

## 2022-07-09 DIAGNOSIS — Z98.89 OTHER SPECIFIED POSTPROCEDURAL STATES: Chronic | ICD-10-CM

## 2022-07-09 LAB
ALBUMIN SERPL ELPH-MCNC: 4 G/DL — SIGNIFICANT CHANGE UP (ref 3.3–5)
ALP SERPL-CCNC: 60 U/L — SIGNIFICANT CHANGE UP (ref 40–120)
ALT FLD-CCNC: 9 U/L — LOW (ref 10–45)
ANION GAP SERPL CALC-SCNC: 6 MMOL/L — SIGNIFICANT CHANGE UP (ref 5–17)
APTT BLD: 32 SEC — SIGNIFICANT CHANGE UP (ref 27.5–35.5)
AST SERPL-CCNC: 17 U/L — SIGNIFICANT CHANGE UP (ref 10–40)
BASOPHILS # BLD AUTO: 0.01 K/UL — SIGNIFICANT CHANGE UP (ref 0–0.2)
BASOPHILS # BLD AUTO: 0.02 K/UL — SIGNIFICANT CHANGE UP (ref 0–0.2)
BASOPHILS NFR BLD AUTO: 0.2 % — SIGNIFICANT CHANGE UP (ref 0–2)
BASOPHILS NFR BLD AUTO: 0.3 % — SIGNIFICANT CHANGE UP (ref 0–2)
BILIRUB SERPL-MCNC: 0.3 MG/DL — SIGNIFICANT CHANGE UP (ref 0.2–1.2)
BLD GP AB SCN SERPL QL: POSITIVE — SIGNIFICANT CHANGE UP
BUN SERPL-MCNC: 33 MG/DL — HIGH (ref 7–23)
CALCIUM SERPL-MCNC: 9.7 MG/DL — SIGNIFICANT CHANGE UP (ref 8.4–10.5)
CHLORIDE SERPL-SCNC: 96 MMOL/L — SIGNIFICANT CHANGE UP (ref 96–108)
CO2 SERPL-SCNC: 36 MMOL/L — HIGH (ref 22–31)
CREAT SERPL-MCNC: 1.01 MG/DL — SIGNIFICANT CHANGE UP (ref 0.5–1.3)
EGFR: 56 ML/MIN/1.73M2 — LOW
EOSINOPHIL # BLD AUTO: 0.09 K/UL — SIGNIFICANT CHANGE UP (ref 0–0.5)
EOSINOPHIL # BLD AUTO: 0.14 K/UL — SIGNIFICANT CHANGE UP (ref 0–0.5)
EOSINOPHIL NFR BLD AUTO: 1.7 % — SIGNIFICANT CHANGE UP (ref 0–6)
EOSINOPHIL NFR BLD AUTO: 2.3 % — SIGNIFICANT CHANGE UP (ref 0–6)
GLUCOSE SERPL-MCNC: 104 MG/DL — HIGH (ref 70–99)
HCT VFR BLD CALC: 34.3 % — LOW (ref 34.5–45)
HCT VFR BLD CALC: 35.6 % — SIGNIFICANT CHANGE UP (ref 34.5–45)
HGB BLD-MCNC: 10 G/DL — LOW (ref 11.5–15.5)
HGB BLD-MCNC: 10.3 G/DL — LOW (ref 11.5–15.5)
IMM GRANULOCYTES NFR BLD AUTO: 0.2 % — SIGNIFICANT CHANGE UP (ref 0–1.5)
IMM GRANULOCYTES NFR BLD AUTO: 0.7 % — SIGNIFICANT CHANGE UP (ref 0–1.5)
INR BLD: 1.23 RATIO — HIGH (ref 0.88–1.16)
LYMPHOCYTES # BLD AUTO: 1.03 K/UL — SIGNIFICANT CHANGE UP (ref 1–3.3)
LYMPHOCYTES # BLD AUTO: 1.09 K/UL — SIGNIFICANT CHANGE UP (ref 1–3.3)
LYMPHOCYTES # BLD AUTO: 17.2 % — SIGNIFICANT CHANGE UP (ref 13–44)
LYMPHOCYTES # BLD AUTO: 20.2 % — SIGNIFICANT CHANGE UP (ref 13–44)
MCHC RBC-ENTMCNC: 26.3 PG — LOW (ref 27–34)
MCHC RBC-ENTMCNC: 26.4 PG — LOW (ref 27–34)
MCHC RBC-ENTMCNC: 28.9 GM/DL — LOW (ref 32–36)
MCHC RBC-ENTMCNC: 29.2 GM/DL — LOW (ref 32–36)
MCV RBC AUTO: 90.5 FL — SIGNIFICANT CHANGE UP (ref 80–100)
MCV RBC AUTO: 91 FL — SIGNIFICANT CHANGE UP (ref 80–100)
MONOCYTES # BLD AUTO: 0.43 K/UL — SIGNIFICANT CHANGE UP (ref 0–0.9)
MONOCYTES # BLD AUTO: 0.52 K/UL — SIGNIFICANT CHANGE UP (ref 0–0.9)
MONOCYTES NFR BLD AUTO: 8 % — SIGNIFICANT CHANGE UP (ref 2–14)
MONOCYTES NFR BLD AUTO: 8.7 % — SIGNIFICANT CHANGE UP (ref 2–14)
NEUTROPHILS # BLD AUTO: 3.77 K/UL — SIGNIFICANT CHANGE UP (ref 1.8–7.4)
NEUTROPHILS # BLD AUTO: 4.24 K/UL — SIGNIFICANT CHANGE UP (ref 1.8–7.4)
NEUTROPHILS NFR BLD AUTO: 69.7 % — SIGNIFICANT CHANGE UP (ref 43–77)
NEUTROPHILS NFR BLD AUTO: 70.8 % — SIGNIFICANT CHANGE UP (ref 43–77)
NRBC # BLD: 0 /100 WBCS — SIGNIFICANT CHANGE UP (ref 0–0)
NRBC # BLD: 0 /100 WBCS — SIGNIFICANT CHANGE UP (ref 0–0)
PLATELET # BLD AUTO: 106 K/UL — LOW (ref 150–400)
PLATELET # BLD AUTO: 107 K/UL — LOW (ref 150–400)
POTASSIUM SERPL-MCNC: 5.1 MMOL/L — SIGNIFICANT CHANGE UP (ref 3.5–5.3)
POTASSIUM SERPL-SCNC: 5.1 MMOL/L — SIGNIFICANT CHANGE UP (ref 3.5–5.3)
PROT SERPL-MCNC: 7.3 G/DL — SIGNIFICANT CHANGE UP (ref 6–8.3)
PROTHROM AB SERPL-ACNC: 14.2 SEC — HIGH (ref 10.5–13.4)
RBC # BLD: 3.79 M/UL — LOW (ref 3.8–5.2)
RBC # BLD: 3.91 M/UL — SIGNIFICANT CHANGE UP (ref 3.8–5.2)
RBC # FLD: 15.1 % — HIGH (ref 10.3–14.5)
RBC # FLD: 15.2 % — HIGH (ref 10.3–14.5)
RH IG SCN BLD-IMP: NEGATIVE — SIGNIFICANT CHANGE UP
SARS-COV-2 RNA SPEC QL NAA+PROBE: SIGNIFICANT CHANGE UP
SODIUM SERPL-SCNC: 138 MMOL/L — SIGNIFICANT CHANGE UP (ref 135–145)
WBC # BLD: 5.4 K/UL — SIGNIFICANT CHANGE UP (ref 3.8–10.5)
WBC # BLD: 5.99 K/UL — SIGNIFICANT CHANGE UP (ref 3.8–10.5)
WBC # FLD AUTO: 5.4 K/UL — SIGNIFICANT CHANGE UP (ref 3.8–10.5)
WBC # FLD AUTO: 5.99 K/UL — SIGNIFICANT CHANGE UP (ref 3.8–10.5)

## 2022-07-09 PROCEDURE — 85025 COMPLETE CBC W/AUTO DIFF WBC: CPT

## 2022-07-09 PROCEDURE — 86900 BLOOD TYPING SEROLOGIC ABO: CPT

## 2022-07-09 PROCEDURE — 36415 COLL VENOUS BLD VENIPUNCTURE: CPT

## 2022-07-09 PROCEDURE — 86901 BLOOD TYPING SEROLOGIC RH(D): CPT

## 2022-07-09 PROCEDURE — 12001 RPR S/N/AX/GEN/TRNK 2.5CM/<: CPT

## 2022-07-09 PROCEDURE — 12001 RPR S/N/AX/GEN/TRNK 2.5CM/<: CPT | Mod: GC

## 2022-07-09 PROCEDURE — 86905 BLOOD TYPING RBC ANTIGENS: CPT

## 2022-07-09 PROCEDURE — 85730 THROMBOPLASTIN TIME PARTIAL: CPT

## 2022-07-09 PROCEDURE — 85610 PROTHROMBIN TIME: CPT

## 2022-07-09 PROCEDURE — 86850 RBC ANTIBODY SCREEN: CPT

## 2022-07-09 PROCEDURE — 86922 COMPATIBILITY TEST ANTIGLOB: CPT

## 2022-07-09 PROCEDURE — 80053 COMPREHEN METABOLIC PANEL: CPT

## 2022-07-09 PROCEDURE — 99284 EMERGENCY DEPT VISIT MOD MDM: CPT | Mod: 25,GC

## 2022-07-09 PROCEDURE — 86880 COOMBS TEST DIRECT: CPT

## 2022-07-09 PROCEDURE — 86870 RBC ANTIBODY IDENTIFICATION: CPT

## 2022-07-09 PROCEDURE — 87635 SARS-COV-2 COVID-19 AMP PRB: CPT

## 2022-07-09 PROCEDURE — 86077 PHYS BLOOD BANK SERV XMATCH: CPT

## 2022-07-09 PROCEDURE — 90715 TDAP VACCINE 7 YRS/> IM: CPT

## 2022-07-09 PROCEDURE — 90471 IMMUNIZATION ADMIN: CPT

## 2022-07-09 PROCEDURE — 99283 EMERGENCY DEPT VISIT LOW MDM: CPT | Mod: 25

## 2022-07-09 RX ORDER — TETANUS TOXOID, REDUCED DIPHTHERIA TOXOID AND ACELLULAR PERTUSSIS VACCINE, ADSORBED 5; 2.5; 8; 8; 2.5 [IU]/.5ML; [IU]/.5ML; UG/.5ML; UG/.5ML; UG/.5ML
0.5 SUSPENSION INTRAMUSCULAR ONCE
Refills: 0 | Status: COMPLETED | OUTPATIENT
Start: 2022-07-09 | End: 2022-07-09

## 2022-07-09 RX ADMIN — TETANUS TOXOID, REDUCED DIPHTHERIA TOXOID AND ACELLULAR PERTUSSIS VACCINE, ADSORBED 0.5 MILLILITER(S): 5; 2.5; 8; 8; 2.5 SUSPENSION INTRAMUSCULAR at 01:40

## 2022-07-09 NOTE — ED PROVIDER NOTE - PATIENT PORTAL LINK FT
You can access the FollowMyHealth Patient Portal offered by [facility] by registering at the following website: [email or web address]. By joining [employer]’s FollowMyHealth portal, you will also be able to view your health information using other applications (apps) compatible with our system.

## 2022-07-09 NOTE — ED PROVIDER NOTE - CLINICAL SUMMARY MEDICAL DECISION MAKING FREE TEXT BOX
Jesi Estevez MD, PGY-3: 81YO F complex medical hx including AF on eliquis, CHF EF 10%, COPD, CAD s/p stents, p/w bleeding from R foot/ankle. vs: bradycardic, irregular rhythm, PE 0.5cm open laceration on lateral R ankle, venous bleed, not hemostatic. given significant bleed/pt on AC, concern for anemia although no symptoms. pt on BB, anticipate blunted tachycardic response i/s/o acute blood loss. plan for basic labs, 4h cbc once hemostasis achieved.

## 2022-07-09 NOTE — ED PROVIDER NOTE - PHYSICAL EXAMINATION
Gen: WDWN, NAD  HEENT: EOMI, no nasal discharge, mucous membranes moist  CV: irregular rhythm, bradycardic, 2+ radial pulses b/l. 2+ R foot DP pulse.   Resp: no accessory muscle use, no increased work of breathing  GI: Abdomen soft non-distended, NTTP  MSK: + b/l darkening of skin on b/l shins. + 0.5cm open laceration on lateral R ankle, venous bleed, not hemostatic.  full ROM of ankles, toes.   Neuro: A&Ox4, following commands, moving all four extremities spontaneously. sensation intact R foot/ankle.   Psych: appropriate mood

## 2022-07-09 NOTE — ED PROVIDER NOTE - ATTENDING CONTRIBUTION TO CARE
Attending MD Urrutia: I personally have seen and examined this patient.  Resident note reviewed and agree on plan of care and except where noted.  See below for details.     Seen in [place], accompanied by daughter in law    82F with PMH/PSH including Afib on Eliquis, CAD s/p VTach, MI, s/p stents x 2, s/p PM/AICD, pulm fibrosis from Amiodarone toxicity, CHF EF 10%, COPD, AAA (~2004, "stent in the main and bifurcations" as per daughter in law), umbilical hernia presents to the ED with bleeding from R ankle area.  Reports has had a "black dot" near ankle and Thursday her daughter's HHA rubbed area and it hurt.  Reports small wound.  Reports that today went to get into bed, when the sheet rubbed on the scab.  Reports then felt bed was wet and "there was blood everywhere", all over the bedding, the floor.  Denies chest pain, shortness of breath, dizziness, abdominal pain, nausea, vomiting, diarrhea, urinary complaints.  Denies bloody or black stools, hematuria, epistaxis, gingival bleeding. Reports has seen wound care, reports previously seen by Dr. Serrano (vascular sx in [place], follows for AAA that needed intervention).    Exam:   General: NAD  HENT: head NCAT, airway patent  Eyes: no conjunctival injection   Lungs: lungs CTAB with good inspiratory effort, no wheezing, no rhonchi, no rales  Cardiac: +S1S2, no obvious m/r/g, irregular  GI: abdomen soft with +BS, NT, umbilical hernia non tender, exam limited secondary to body habitus  : no CVAT  MSK: ranging neck and extremities freely, +2 DPs, +4mm wound to the R lateral ankle, active bleeding, FROM at ankle and toes  Neuro: moving all extremities spontaneously, sensory grossly intact, no gross neuro deficits  Psych: normal mood and affect    A/P: 82F with R lateral ankle bleed, suspect varicose vein bleed, will check labs, apply compression dressing, reassess

## 2022-07-09 NOTE — ED PROVIDER NOTE - OBJECTIVE STATEMENT
83YO F complex medical hx including AF on eliquis, CHF EF 10%, COPD, CAD s/p stents, p/w bleeding from R foot/ankle. pt had scab which came off 1.5 hours prior, pt started bleeding from the site. denies dizziness, palpitations. denies hematochezia, vaginal bleeding. BIBA from home, pt on AC, took dose this morning.

## 2022-07-09 NOTE — ED PROVIDER NOTE - NS ED ROS FT
Gen: Denies fevers  CV: Denies chest pain  Skin: + R foot open wound.   Resp: Denies SOB  Msk: R ankle/foot pain  Neuro: Denies presyncope/syncope

## 2022-07-09 NOTE — ED PROVIDER NOTE - PROGRESS NOTE DETAILS
Jesi Estevez MD, PGY-3: cbc stable. will get 4h repeat. pt without active bleed. will keep ace wrap in place. Jesi Estevez MD, PGY-3: 4 hour hemoglobin stable; figure of 8 stitch functioning, no active bleed, no subcutaneous hematoma. will dc with vascular f/u.

## 2022-07-09 NOTE — ED PROVIDER NOTE - NSFOLLOWUPINSTRUCTIONS_ED_ALL_ED_FT
--today, the lab tests we did include cbc, cmp, results significant for anemia (hb 10), on repeat ***. we have included these test results in your paperwork. please take them to your follow-up appointment  --given that you were in the ED today, we recommend a followup visit with your general doctor (primary care doctor) within 5 days.   --your diagnosis is: superficial laceration  --return to the ED if feeling tired/weak, if palpitations, if recurrence of bleeding. --today, the lab tests we did include cbc, cmp, results significant for anemia (hb 10), on repeat 10.3. we have included these test results in your paperwork. please take them to your follow-up appointment  --given that you were in the ED today, we recommend a followup visit with your general doctor (primary care doctor) within 5 days AND vascular.   --your diagnosis is: superficial laceration  --return to the ED if feeling tired/weak, if palpitations, if recurrence of bleeding, if sutures come out, if pus drains from the wound.   --keep area very clean and dry; return to ED in 7 days for suture removal. do not wear ACE wrap at night.

## 2022-07-09 NOTE — ED ADULT NURSE NOTE - OBJECTIVE STATEMENT
82y F with chf, afib (on eliquis), presents to the ED brought in by EMS presenting 82y F with chf, afib (on eliquis), brought in by EMS presents to the ED with bleeding to the lateral side of the R ankle. As per pt, pt had a scab on the R ankle and was pulled off with bedsheets, and kept bleeding. Pt denies feeling light headed, dizziness, cp, sob, fevers/chills. Upon assessment, pt is A&Ox3, breathing spontaneously, unlabored, and speaking in full sentences. 82y F with chf, afib (on eliquis), brought in by EMS presents to the ED with bleeding to the lateral side of the R ankle. As per pt, pt had a scab on the R ankle and was pulled off with bedsheets, and kept bleeding. Pt denies feeling light headed, dizziness, cp, sob, fevers/chills. Upon assessment, pt is A&Ox3, breathing spontaneously, unlabored, and speaking in full sentences. 0.5cm lac noted to the lateral side of R ankle, dressed and bandaged by MD at bedside, bleeding controlled. Able to move extremities. Skin is warm, dry and intact. Pt safety and comfort measures provided. Daughter-in-law at bedside.

## 2022-07-19 PROBLEM — J31.0 CHRONIC RHINOSINUSITIS: Status: ACTIVE | Noted: 2019-11-04

## 2022-08-01 ENCOUNTER — APPOINTMENT (OUTPATIENT)
Dept: VASCULAR SURGERY | Facility: CLINIC | Age: 82
End: 2022-08-01

## 2022-08-01 VITALS
DIASTOLIC BLOOD PRESSURE: 82 MMHG | WEIGHT: 202 LBS | HEART RATE: 85 BPM | SYSTOLIC BLOOD PRESSURE: 163 MMHG | HEIGHT: 59 IN | TEMPERATURE: 98.5 F | BODY MASS INDEX: 40.72 KG/M2

## 2022-08-01 DIAGNOSIS — I87.2 VENOUS INSUFFICIENCY (CHRONIC) (PERIPHERAL): ICD-10-CM

## 2022-08-01 DIAGNOSIS — Z98.890 OTHER SPECIFIED POSTPROCEDURAL STATES: ICD-10-CM

## 2022-08-01 DIAGNOSIS — Z86.79 OTHER SPECIFIED POSTPROCEDURAL STATES: ICD-10-CM

## 2022-08-01 DIAGNOSIS — I83.893 VARICOSE VEINS OF BILATERAL LOWER EXTREMITIES WITH OTHER COMPLICATIONS: ICD-10-CM

## 2022-08-01 DIAGNOSIS — I71.4 ABDOMINAL AORTIC ANEURYSM, W/OUT RUPTURE: ICD-10-CM

## 2022-08-01 PROCEDURE — 99214 OFFICE O/P EST MOD 30 MIN: CPT | Mod: 25

## 2022-08-01 PROCEDURE — 29580 STRAPPING UNNA BOOT: CPT | Mod: RT

## 2022-08-01 PROCEDURE — 93970 EXTREMITY STUDY: CPT

## 2022-08-01 NOTE — VITALS
PAP supply order and sleep records was faxed to [facility] [employer] on 1/13/17 due to [facility] no longer accepting patient's insurance [de-identified] : none

## 2022-08-01 NOTE — HISTORY OF PRESENT ILLNESS
[FreeTextEntry1] : Pt states  hx of AAA  s/p EVAR \par CTA in 2020 AAA sac 7.4  by Dr Severino \par Pt states she does not want to be eval or f/u for AAA EVAR  at this time\par \par \par Pt c/o  bilateral leg  varicose veins and rt ankle area w  previous varicose veins  bleeding \par pt has applied  band aids to the previously  bleeding sites \par \par pt underwent in the recent past for right medial malleolus  venous ulcer  unna boot rx \par \par \par \par

## 2022-08-01 NOTE — PHYSICAL EXAM
[1+] : left 1+ [2+] : left 2+ [Ankle Swelling (On Exam)] : present [Ankle Swelling Bilaterally] : bilaterally  [Varicose Veins Of Lower Extremities] : bilaterally [Ankle Swelling On The Right] : mild [] : bilaterally [Ankle Swelling On The Left] : moderate [No Rash or Lesion] : No rash or lesion [Alert] : alert [Oriented to Person] : oriented to person [Oriented to Place] : oriented to place [Oriented to Time] : oriented to time [Calm] : calm [JVD] : no jugular venous distention  [Abdomen Masses] : No abdominal masses [de-identified] : nad [de-identified] : wnl [FreeTextEntry1] : Moderate bilateral leg venous insufficiency \par w moderate to severe bilateral leg stasis dermatitis, hyperpigmentation in the gator area, lipodermatosclerosis\par and mild to moderate bilateral leg edema \par Multiple  bilateral right left leg small and medium tortuous varicose  veins and spider veins  ant post medial thigh and calf and shin \par RLE Varicose veins measuring 2-3, 3-4 mm in size on the distal  thigh/calf /shin \par LLE Varicose veins measuring  2-3, 3-4  mm in size on the distal thigh/calf /shin \par right medial anterior and lateral  ankle 1-2 mm wounds w eschars  from healing varicose veins wound  ulcers \par no active bleeding at this time \par \par  [de-identified] : mod obese  [de-identified] :  ambulates slowly w walker  [de-identified] : Charlie Cranial nerves 2-12 charlie grossly intact [de-identified] : cooperative

## 2022-08-01 NOTE — PROCEDURE
[FreeTextEntry1] : Right Unna boot applied from toes to knee  w/o complications\par Pt joseph procedure well\par Pt is not allergic to the unna boot\par

## 2022-08-01 NOTE — ASSESSMENT
[FreeTextEntry1] : Impression  symptomatic venous insuff  w recent  right foot bleeding  and healing wounds and s/p AAA EVAR\par pt refusing  surveillance \par \par \par Plan Med Conservative management leg elevation, knee high compression stockings 15-20mm Hg (rx given) to start on the lle and on the rle once  rle venous insuff wounds heal \par d/w pt and son in law  that pt's daughter who is an rx will remove current unna boot on wed/thurs and reapply \par Indications risks and benefits of a Charlie  LE GSV   RFA  were explained to pt who understands\par Pt wants to think about it and decide\par rto 1 week to re eval \par \par \par Varicose veins are enlarged, twisted veins. Varicose veins are caused by increased blood pressure in the veins.  The blood moves towards the heart by 1-way valves in the veins. When the valves become weakened or damaged, blood can collect in the veins and pool in your lower legs (ankles). This causes the veins to become enlarged and incompetent with reflux. Sitting or standing for long periods can cause blood to pool in the leg veins, increasing the pressure within the veins. \par Risk factors for varicose veins or venous disease may include:  obesity, older age, standing or sitting for prolonged periods of time for several years, being female, pregnancy, taking oral contraceptive pills or hormone replacement, being inactive, and/or smoking. \par The most common symptoms of varicose veins are sensations in the legs, such as a heavy feeling, burning, and/or aching. However, each individual may experience symptoms differently.  Other symptoms may include:  color changes in the skin, sores on the legs, or rash.  Severe varicose veins or venous disease may eventually produce long-term mild swelling that can result in more serious skin and tissue problems, such as ulcers and non-healing sores.\par Varicose veins and venous disease are diagnosed by a complete medical history, physical examination, and diagnostic studies for varicose veins including duplex ultrasound and color-flow imaging.  \par Medical treatment for varicose veins and venous disease include:  compression stockings, sclerotherapy, endovenous ablation and/or surgical treatment with microphlebectomy.  \par \par  [Arterial/Venous Disease] : arterial/venous disease [Other: _____] : [unfilled]

## 2022-08-01 NOTE — DATA REVIEWED
[FreeTextEntry1] : 8/1/2022  Venous Doppler Charlie LE  no acute dvt svt \par                            RLE GSV insuff from sfj to knee  level w insuff   distal collaterals\par                            LLE   GSV insuff from sfj to knee  level w insuff   distal collaterals\par \par

## 2022-08-09 ENCOUNTER — APPOINTMENT (OUTPATIENT)
Dept: VASCULAR SURGERY | Facility: CLINIC | Age: 82
End: 2022-08-09

## 2022-11-28 ENCOUNTER — INPATIENT (INPATIENT)
Facility: HOSPITAL | Age: 82
LOS: 2 days | Discharge: HOME HEALTH | DRG: 291 | End: 2022-12-01
Attending: INTERNAL MEDICINE | Admitting: INTERNAL MEDICINE
Payer: MEDICARE

## 2022-11-28 VITALS
OXYGEN SATURATION: 100 % | HEART RATE: 57 BPM | RESPIRATION RATE: 16 BRPM | SYSTOLIC BLOOD PRESSURE: 123 MMHG | TEMPERATURE: 98 F | WEIGHT: 149.91 LBS | HEIGHT: 65 IN | DIASTOLIC BLOOD PRESSURE: 59 MMHG

## 2022-11-28 DIAGNOSIS — Z90.710 ACQUIRED ABSENCE OF BOTH CERVIX AND UTERUS: Chronic | ICD-10-CM

## 2022-11-28 DIAGNOSIS — Z98.89 OTHER SPECIFIED POSTPROCEDURAL STATES: Chronic | ICD-10-CM

## 2022-11-28 DIAGNOSIS — I50.9 HEART FAILURE, UNSPECIFIED: ICD-10-CM

## 2022-11-28 LAB
ALBUMIN SERPL ELPH-MCNC: 3.9 G/DL — SIGNIFICANT CHANGE UP (ref 3.3–5)
ALP SERPL-CCNC: 61 U/L — SIGNIFICANT CHANGE UP (ref 40–120)
ALT FLD-CCNC: 16 U/L — SIGNIFICANT CHANGE UP (ref 10–45)
ANION GAP SERPL CALC-SCNC: 7 MMOL/L — SIGNIFICANT CHANGE UP (ref 5–17)
ANION GAP SERPL CALC-SCNC: 8 MMOL/L — SIGNIFICANT CHANGE UP (ref 5–17)
APPEARANCE UR: CLEAR — SIGNIFICANT CHANGE UP
APTT BLD: 29.9 SEC — SIGNIFICANT CHANGE UP (ref 27.5–35.5)
AST SERPL-CCNC: 33 U/L — SIGNIFICANT CHANGE UP (ref 10–40)
BACTERIA # UR AUTO: NEGATIVE — SIGNIFICANT CHANGE UP
BASOPHILS # BLD AUTO: 0.02 K/UL — SIGNIFICANT CHANGE UP (ref 0–0.2)
BASOPHILS NFR BLD AUTO: 0.4 % — SIGNIFICANT CHANGE UP (ref 0–2)
BILIRUB SERPL-MCNC: 0.4 MG/DL — SIGNIFICANT CHANGE UP (ref 0.2–1.2)
BILIRUB UR-MCNC: NEGATIVE — SIGNIFICANT CHANGE UP
BUN SERPL-MCNC: 34 MG/DL — HIGH (ref 7–23)
BUN SERPL-MCNC: 36 MG/DL — HIGH (ref 7–23)
CALCIUM SERPL-MCNC: 9.1 MG/DL — SIGNIFICANT CHANGE UP (ref 8.4–10.5)
CALCIUM SERPL-MCNC: 9.3 MG/DL — SIGNIFICANT CHANGE UP (ref 8.4–10.5)
CHLORIDE SERPL-SCNC: 95 MMOL/L — LOW (ref 96–108)
CHLORIDE SERPL-SCNC: 97 MMOL/L — SIGNIFICANT CHANGE UP (ref 96–108)
CO2 SERPL-SCNC: 35 MMOL/L — HIGH (ref 22–31)
CO2 SERPL-SCNC: 37 MMOL/L — HIGH (ref 22–31)
COLOR SPEC: SIGNIFICANT CHANGE UP
CREAT SERPL-MCNC: 0.9 MG/DL — SIGNIFICANT CHANGE UP (ref 0.5–1.3)
CREAT SERPL-MCNC: 0.93 MG/DL — SIGNIFICANT CHANGE UP (ref 0.5–1.3)
DIFF PNL FLD: NEGATIVE — SIGNIFICANT CHANGE UP
EGFR: 62 ML/MIN/1.73M2 — SIGNIFICANT CHANGE UP
EGFR: 64 ML/MIN/1.73M2 — SIGNIFICANT CHANGE UP
EOSINOPHIL # BLD AUTO: 0.1 K/UL — SIGNIFICANT CHANGE UP (ref 0–0.5)
EOSINOPHIL NFR BLD AUTO: 2.2 % — SIGNIFICANT CHANGE UP (ref 0–6)
EPI CELLS # UR: 1 /HPF — SIGNIFICANT CHANGE UP
GLUCOSE SERPL-MCNC: 76 MG/DL — SIGNIFICANT CHANGE UP (ref 70–99)
GLUCOSE SERPL-MCNC: 85 MG/DL — SIGNIFICANT CHANGE UP (ref 70–99)
GLUCOSE UR QL: NEGATIVE — SIGNIFICANT CHANGE UP
HCT VFR BLD CALC: 37.6 % — SIGNIFICANT CHANGE UP (ref 34.5–45)
HGB BLD-MCNC: 10.9 G/DL — LOW (ref 11.5–15.5)
HYALINE CASTS # UR AUTO: 0 /LPF — SIGNIFICANT CHANGE UP (ref 0–2)
IMM GRANULOCYTES NFR BLD AUTO: 0.2 % — SIGNIFICANT CHANGE UP (ref 0–0.9)
INR BLD: 1.3 RATIO — HIGH (ref 0.88–1.16)
KETONES UR-MCNC: NEGATIVE — SIGNIFICANT CHANGE UP
LEUKOCYTE ESTERASE UR-ACNC: ABNORMAL
LYMPHOCYTES # BLD AUTO: 0.73 K/UL — LOW (ref 1–3.3)
LYMPHOCYTES # BLD AUTO: 16.4 % — SIGNIFICANT CHANGE UP (ref 13–44)
MCHC RBC-ENTMCNC: 25.6 PG — LOW (ref 27–34)
MCHC RBC-ENTMCNC: 29 GM/DL — LOW (ref 32–36)
MCV RBC AUTO: 88.3 FL — SIGNIFICANT CHANGE UP (ref 80–100)
MONOCYTES # BLD AUTO: 0.3 K/UL — SIGNIFICANT CHANGE UP (ref 0–0.9)
MONOCYTES NFR BLD AUTO: 6.7 % — SIGNIFICANT CHANGE UP (ref 2–14)
NEUTROPHILS # BLD AUTO: 3.3 K/UL — SIGNIFICANT CHANGE UP (ref 1.8–7.4)
NEUTROPHILS NFR BLD AUTO: 74.1 % — SIGNIFICANT CHANGE UP (ref 43–77)
NITRITE UR-MCNC: NEGATIVE — SIGNIFICANT CHANGE UP
NRBC # BLD: 0 /100 WBCS — SIGNIFICANT CHANGE UP (ref 0–0)
NT-PROBNP SERPL-SCNC: 1152 PG/ML — HIGH (ref 0–300)
PH UR: 6.5 — SIGNIFICANT CHANGE UP (ref 5–8)
PLATELET # BLD AUTO: 99 K/UL — LOW (ref 150–400)
POTASSIUM SERPL-MCNC: 4.7 MMOL/L — SIGNIFICANT CHANGE UP (ref 3.5–5.3)
POTASSIUM SERPL-MCNC: 6.8 MMOL/L — CRITICAL HIGH (ref 3.5–5.3)
POTASSIUM SERPL-SCNC: 4.7 MMOL/L — SIGNIFICANT CHANGE UP (ref 3.5–5.3)
POTASSIUM SERPL-SCNC: 6.8 MMOL/L — CRITICAL HIGH (ref 3.5–5.3)
PROT SERPL-MCNC: 7.8 G/DL — SIGNIFICANT CHANGE UP (ref 6–8.3)
PROT UR-MCNC: ABNORMAL
PROTHROM AB SERPL-ACNC: 15 SEC — HIGH (ref 10.5–13.4)
RAPID RVP RESULT: SIGNIFICANT CHANGE UP
RBC # BLD: 4.26 M/UL — SIGNIFICANT CHANGE UP (ref 3.8–5.2)
RBC # FLD: 15.5 % — HIGH (ref 10.3–14.5)
RBC CASTS # UR COMP ASSIST: 0 /HPF — SIGNIFICANT CHANGE UP (ref 0–4)
SARS-COV-2 RNA SPEC QL NAA+PROBE: SIGNIFICANT CHANGE UP
SODIUM SERPL-SCNC: 138 MMOL/L — SIGNIFICANT CHANGE UP (ref 135–145)
SODIUM SERPL-SCNC: 141 MMOL/L — SIGNIFICANT CHANGE UP (ref 135–145)
SP GR SPEC: 1.02 — SIGNIFICANT CHANGE UP (ref 1.01–1.02)
TROPONIN T, HIGH SENSITIVITY RESULT: 25 NG/L — SIGNIFICANT CHANGE UP (ref 0–51)
UROBILINOGEN FLD QL: ABNORMAL
WBC # BLD: 4.46 K/UL — SIGNIFICANT CHANGE UP (ref 3.8–10.5)
WBC # FLD AUTO: 4.46 K/UL — SIGNIFICANT CHANGE UP (ref 3.8–10.5)
WBC UR QL: 4 /HPF — SIGNIFICANT CHANGE UP (ref 0–5)

## 2022-11-28 PROCEDURE — 71045 X-RAY EXAM CHEST 1 VIEW: CPT | Mod: 26

## 2022-11-28 PROCEDURE — 99285 EMERGENCY DEPT VISIT HI MDM: CPT | Mod: FS

## 2022-11-28 PROCEDURE — 93010 ELECTROCARDIOGRAM REPORT: CPT

## 2022-11-28 PROCEDURE — 99223 1ST HOSP IP/OBS HIGH 75: CPT

## 2022-11-28 RX ORDER — SODIUM CHLORIDE 9 MG/ML
250 INJECTION INTRAMUSCULAR; INTRAVENOUS; SUBCUTANEOUS ONCE
Refills: 0 | Status: COMPLETED | OUTPATIENT
Start: 2022-11-28 | End: 2022-11-28

## 2022-11-28 RX ORDER — IPRATROPIUM/ALBUTEROL SULFATE 18-103MCG
3 AEROSOL WITH ADAPTER (GRAM) INHALATION ONCE
Refills: 0 | Status: COMPLETED | OUTPATIENT
Start: 2022-11-28 | End: 2022-11-28

## 2022-11-28 RX ADMIN — SODIUM CHLORIDE 250 MILLILITER(S): 9 INJECTION INTRAMUSCULAR; INTRAVENOUS; SUBCUTANEOUS at 21:20

## 2022-11-28 NOTE — H&P ADULT - NSHPSOCIALHISTORY_GEN_ALL_CORE
- Former smoker >25yrs ago  - Denies EtOH consumption  - Denies illicit drug use  - Living at home w/  and daughter

## 2022-11-28 NOTE — H&P ADULT - ASSESSMENT
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

## 2022-11-28 NOTE — ED PROVIDER NOTE - OBJECTIVE STATEMENT
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 Reports feeling weak, fatigued, and most recently with body aches, poor appetite, and increased SOB from baseline. Reports her spO2 drops into 80s with ambulation at home while on her home O2. No significant change in LE edema. Taking all meds as prescribed. Also reports hx of anemia and hx of LLE non-healing wound that is overall improved.  Sent to ED by her PCP. Denies any fever/chills, CP, cough, sore throat, abdominal pain, n/v/d, melena, BRBPR, urinary symptoms.

## 2022-11-28 NOTE — H&P ADULT - PROBLEM SELECTOR PLAN 4
- No wheezing on exam and none recorded prior to duoneb administration w/ no increased cough or O2 req at home (suspect ZAMAN d/t multiple co-morbidities as compared to COPD exacerbation)   - Start Duonebs q6hrs PRN for wheezing/SOB

## 2022-11-28 NOTE — ED PROVIDER NOTE - NS ED ATTENDING STATEMENT MOD
Attending with This was a shared visit with the JULES. I reviewed and verified the documentation and independently performed the documented:

## 2022-11-28 NOTE — ED ADULT NURSE NOTE - NSIMPLEMENTINTERV_GEN_ALL_ED
Implemented All Fall with Harm Risk Interventions:  [place] to call system. Call bell, personal items and telephone within reach. Instruct patient to call for assistance. Room bathroom lighting operational. Non-slip footwear when patient is off stretcher. Physically safe environment: no spills, clutter or unnecessary equipment. Stretcher in lowest position, wheels locked, appropriate side rails in place. Provide visual cue, wrist band, yellow gown, etc. Monitor gait and stability. Monitor for mental status changes and reorient to person, place, and time. Review medications for side effects contributing to fall risk. Reinforce activity limits and safety measures with patient and family. Provide visual clues: red socks.

## 2022-11-28 NOTE — ED PROVIDER NOTE - CARE PLAN
Principal Discharge DX:	Dyspnea due to congestive heart failure  Secondary Diagnosis:	COPD exacerbation   1

## 2022-11-28 NOTE — H&P ADULT - NSHPPHYSICALEXAM_GEN_ALL_CORE
Vital Signs Last 24 Hrs  T(C): 36.7 (28 Nov 2022 21:33), Max: 36.7 (28 Nov 2022 21:33)  T(F): 98 (28 Nov 2022 21:33), Max: 98 (28 Nov 2022 21:33)  HR: 63 (29 Nov 2022 00:05) (57 - 63)  BP: 134/57 (29 Nov 2022 00:05) (112/54 - 135/55)  RR: 18 (29 Nov 2022 00:05) (16 - 20)  SpO2: 97% (29 Nov 2022 00:05) (97% - 100%)    CONSTITUTIONAL: Well-groomed, in no apparent distress  EYES: No conjunctival or scleral injection, non-icteric;   ENMT: No external nasal lesions; MMM  NECK: Trachea midline without palpable neck mass; thyroid not enlarged and non-tender  RESPIRATORY: Decreased breath sounds b/l. Breathing comfortably; no dullness to percussion; lungs CTA without wheeze  CARDIOVASCULAR: +S1S2, RRR, no M/G/R; pedal pulses full and symmetric; minimal lower extremity edema  GASTROINTESTINAL: No palpable masses or tenderness, +BS throughout, no rebound/guarding; no hepatosplenomegaly; no hernia palpated  LYMPHATIC: No cervical LAD or tenderness  SKIN: No rashes or ulcers noted  NEUROLOGIC: CN II-XII intact; sensation intact in LEs b/l to light touch  PSYCHIATRIC: A&Ox3; mood and affect appropriate; appropriate insight and judgment

## 2022-11-28 NOTE — ED PROVIDER NOTE - NSICDXPASTMEDICALHX_GEN_ALL_CORE_FT
PAST MEDICAL HISTORY:  AICD (automatic cardioverter/defibrillator) present     Atrial fibrillation     CHF (congestive heart failure)     Pulmonary fibrosis     Stented coronary artery

## 2022-11-28 NOTE — CHART NOTE - NSCHARTNOTEFT_GEN_A_CORE
Sent in by Dr Ede Vidal for HF evaluation.     Please call me prior to disposition    Gamaliel Russell DO  [phone]

## 2022-11-28 NOTE — H&P ADULT - PROBLEM SELECTOR PLAN 1
- Suspect patient's presentation is i/s/o FTT and deconditioning  - PT consult placed  - May need increased O2 titration w/ ambulation given desats when walking  - Obtain ambulatory O2 w/ ambulation  - TTE and Device interrogation in AM to ro cardiac process

## 2022-11-28 NOTE — H&P ADULT - HISTORY OF PRESENT ILLNESS
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    States about a month ago she suffered an injury to her LLE which is non-healing but overall improved. Since that initial injury, she states she wasn't as mobile and active as she was prior. As the wound improved, she has more recently attempted to get up and return to her baseline activity level but has felt more tired and fatigued doing such activities a/w decreased appetite as well. She also states when she ambulates her O2 sat would occasionally drop to mid-80s and improve to mid-90s on rest.     She otherwise denies any F/C, HA, CP, abd pain, diarrhea or urinary symptoms. Denies any wheezing, cough, need to increased O2 at home d/t SOB or increase in LE edema.     In the ED she was administered 250cc IVF and Duoneb x1

## 2022-11-28 NOTE — ED ADULT NURSE NOTE - OBJECTIVE STATEMENT
82 year old female, A&Ox3, PMH of CHF, COPD, anemia, AAA with stent placement, AICD, Afib on Eliquis, BIB EMS for generalized weakness. Patient states she has been feeling weak for the past 5-6 days. Patient uses 2 L oxygen at home via nasal cannula for COPD.  Patient notices lately that with minimal walking her SPO2 is dropping into the 80s. Patient also complaining of feeling short of breath but denies chest pain. Denies N/V/D, difficulty urinating and palpitations at this time. Patient recently here for bleeding to an injury of the RLE that did not stop bleeding for a few days. patient also has history of anemia but never had a blood transfusion before. Heart rate and rhythm regular. Patient placed on CM. Respirations clear, equal and unlabored bilaterally. Abdomen soft, nontender and nondistended. Discoloration noted on the lower extremities bilaterally. Patient in NAD at this time. IV placed and labs drawn. Safety and comfort measures maintained.

## 2022-11-28 NOTE — H&P ADULT - PROBLEM SELECTOR PLAN 2
- Last TTE on record from 2021 w/ EF 45-50%  - s/p Ning by [employer] AICD  - Do not suspect patient is in acute on chronic HFrEF given no increased WOB or O2 req at baseline, no worsening orthopnea, lungs w/ decreased breath sounds d/t pulm dz but no signs of VOL and no JVD  - Will c/w home GDMT  - f/u TTE in AM to assess for worsening of disease  - Device interrogation in AM  - Monitor BMP and I/Os  - Daily weight

## 2022-11-28 NOTE — H&P ADULT - NSHPLABSRESULTS_GEN_ALL_CORE
LABS:                      10.9   4.46  )-----------( 99       ( 2022 18:38 )             37.6         141  |  97  |  34<H>  ----------------------------<  85  4.7   |  37<H>  |  0.93    Ca    9.1      2022 20:03    TPro  7.8  /  Alb  3.9  /  TBili  0.4  /  DBili  x   /  AST  33  /  ALT  16  /  AlkPhos  61      LIVER FUNCTIONS - ( 2022 18:38 )  Alb: 3.9 g/dL / Pro: 7.8 g/dL / ALK PHOS: 61 U/L / ALT: 16 U/L / AST: 33 U/L / GGT: x           PT/INR - ( 2022 18:38 )   PT: 15.0 sec;   INR: 1.30 ratio    PTT - ( 2022 18:38 )  PTT:29.9 sec    Urinalysis Basic - ( 2022 21:05 )  Color: Light Yellow / Appearance: Clear / S.017 / pH: x  Gluc: x / Ketone: Negative  / Bili: Negative / Urobili: 2 mg/dL   Blood: x / Protein: Trace / Nitrite: Negative   Leuk Esterase: Small / RBC: 0 /hpf / WBC 4 /HPF   Sq Epi: x / Non Sq Epi: 1 /hpf / Bacteria: Negative    IMAGING:  Xray Chest 1 View AP/PA (22 @ 18:48)  INTERPRETATION:  No focal consolidation.  ******PRELIMINARY REPORT******      [X] Imaging personally reviewed by me- No consolidation  [X] ECG personally reviewed by me- Afib w/ slow ventricular response- paced rhythm

## 2022-11-28 NOTE — ED PROVIDER NOTE - ATTENDING CONTRIBUTION TO CARE
------------ATTENDING NOTE------------  pt w/ daughter c/o increasing generalized malaise/fatigue, increased SOB and occasional unproductive cough, increased BLE symm edema, c/w slight decompensated CHF, likely some COPD component, no fevers, daughter concerned pt cannot get around at home, planned admission for cotinued burch, optimize medical mgmt, TTE, PT/OT, outpt needs assessments.  - Sahil Fuentes MD   ----------------------------------------------

## 2022-11-28 NOTE — H&P ADULT - NSHPREVIEWOFSYSTEMS_GEN_ALL_CORE
CONSTITUTIONAL: Fatigue. No fever, weight loss  EYES: No eye pain, visual disturbances, or discharge  ENMT: No difficulty hearing, tinnitus, vertigo; No sinus or throat pain  RESPIRATORY: SOB. No cough, wheezing, chills or hemoptysis  CARDIOVASCULAR: No chest pain, palpitations, dizziness, or leg swelling  GASTROINTESTINAL: No abdominal or epigastric pain. No nausea, vomiting, or hematemesis; No diarrhea or constipation. No melena or hematochezia.  GENITOURINARY: No dysuria, frequency, hematuria, or incontinence  NEUROLOGICAL: No headaches, memory loss, loss of strength, numbness, or tremors  SKIN: No itching, burning, rashes, or lesions   LYMPH NODES: No enlarged glands  ENDOCRINE: No heat or cold intolerance; No hair loss  MUSCULOSKELETAL: No joint pain or swelling; No muscle, back pain  PSYCHIATRIC: No depression, anxiety, mood swings, or difficulty sleeping  HEME/LYMPH: No easy bruising, or bleeding gums

## 2022-11-29 DIAGNOSIS — I50.22 CHRONIC SYSTOLIC (CONGESTIVE) HEART FAILURE: ICD-10-CM

## 2022-11-29 DIAGNOSIS — I48.0 PAROXYSMAL ATRIAL FIBRILLATION: ICD-10-CM

## 2022-11-29 DIAGNOSIS — J44.9 CHRONIC OBSTRUCTIVE PULMONARY DISEASE, UNSPECIFIED: ICD-10-CM

## 2022-11-29 DIAGNOSIS — R62.7 ADULT FAILURE TO THRIVE: ICD-10-CM

## 2022-11-29 DIAGNOSIS — J84.10 PULMONARY FIBROSIS, UNSPECIFIED: ICD-10-CM

## 2022-11-29 DIAGNOSIS — Z29.9 ENCOUNTER FOR PROPHYLACTIC MEASURES, UNSPECIFIED: ICD-10-CM

## 2022-11-29 DIAGNOSIS — I48.91 UNSPECIFIED ATRIAL FIBRILLATION: ICD-10-CM

## 2022-11-29 LAB
ACANTHOCYTES BLD QL SMEAR: SLIGHT — SIGNIFICANT CHANGE UP
ANION GAP SERPL CALC-SCNC: 7 MMOL/L — SIGNIFICANT CHANGE UP (ref 5–17)
ANISOCYTOSIS BLD QL: SLIGHT — SIGNIFICANT CHANGE UP
BASOPHILS # BLD AUTO: 0 K/UL — SIGNIFICANT CHANGE UP (ref 0–0.2)
BASOPHILS NFR BLD AUTO: 0 % — SIGNIFICANT CHANGE UP (ref 0–2)
BUN SERPL-MCNC: 30 MG/DL — HIGH (ref 7–23)
CALCIUM SERPL-MCNC: 9.1 MG/DL — SIGNIFICANT CHANGE UP (ref 8.4–10.5)
CHLORIDE SERPL-SCNC: 98 MMOL/L — SIGNIFICANT CHANGE UP (ref 96–108)
CO2 SERPL-SCNC: 37 MMOL/L — HIGH (ref 22–31)
CREAT SERPL-MCNC: 0.88 MG/DL — SIGNIFICANT CHANGE UP (ref 0.5–1.3)
DACRYOCYTES BLD QL SMEAR: SLIGHT — SIGNIFICANT CHANGE UP
EGFR: 66 ML/MIN/1.73M2 — SIGNIFICANT CHANGE UP
ELLIPTOCYTES BLD QL SMEAR: SLIGHT — SIGNIFICANT CHANGE UP
EOSINOPHIL # BLD AUTO: 0.11 K/UL — SIGNIFICANT CHANGE UP (ref 0–0.5)
EOSINOPHIL NFR BLD AUTO: 2.6 % — SIGNIFICANT CHANGE UP (ref 0–6)
GIANT PLATELETS BLD QL SMEAR: PRESENT — SIGNIFICANT CHANGE UP
GLUCOSE SERPL-MCNC: 75 MG/DL — SIGNIFICANT CHANGE UP (ref 70–99)
HCT VFR BLD CALC: 33.4 % — LOW (ref 34.5–45)
HGB BLD-MCNC: 9.5 G/DL — LOW (ref 11.5–15.5)
HYPOCHROMIA BLD QL: SLIGHT — SIGNIFICANT CHANGE UP
LYMPHOCYTES # BLD AUTO: 0.92 K/UL — LOW (ref 1–3.3)
LYMPHOCYTES # BLD AUTO: 21.9 % — SIGNIFICANT CHANGE UP (ref 13–44)
MACROCYTES BLD QL: SLIGHT — SIGNIFICANT CHANGE UP
MAGNESIUM SERPL-MCNC: 2.5 MG/DL — SIGNIFICANT CHANGE UP (ref 1.6–2.6)
MANUAL SMEAR VERIFICATION: SIGNIFICANT CHANGE UP
MCHC RBC-ENTMCNC: 25.3 PG — LOW (ref 27–34)
MCHC RBC-ENTMCNC: 28.4 GM/DL — LOW (ref 32–36)
MCV RBC AUTO: 89.1 FL — SIGNIFICANT CHANGE UP (ref 80–100)
MICROCYTES BLD QL: SLIGHT — SIGNIFICANT CHANGE UP
MONOCYTES # BLD AUTO: 0.26 K/UL — SIGNIFICANT CHANGE UP (ref 0–0.9)
MONOCYTES NFR BLD AUTO: 6.2 % — SIGNIFICANT CHANGE UP (ref 2–14)
NEUTROPHILS # BLD AUTO: 2.91 K/UL — SIGNIFICANT CHANGE UP (ref 1.8–7.4)
NEUTROPHILS NFR BLD AUTO: 69.3 % — SIGNIFICANT CHANGE UP (ref 43–77)
PHOSPHATE SERPL-MCNC: 3.6 MG/DL — SIGNIFICANT CHANGE UP (ref 2.5–4.5)
PLAT MORPH BLD: NORMAL — SIGNIFICANT CHANGE UP
PLATELET # BLD AUTO: 91 K/UL — LOW (ref 150–400)
POIKILOCYTOSIS BLD QL AUTO: SLIGHT — SIGNIFICANT CHANGE UP
POTASSIUM SERPL-MCNC: 4.7 MMOL/L — SIGNIFICANT CHANGE UP (ref 3.5–5.3)
POTASSIUM SERPL-SCNC: 4.7 MMOL/L — SIGNIFICANT CHANGE UP (ref 3.5–5.3)
PROCALCITONIN SERPL-MCNC: 0.06 NG/ML — SIGNIFICANT CHANGE UP (ref 0.02–0.1)
RBC # BLD: 3.75 M/UL — LOW (ref 3.8–5.2)
RBC # FLD: 15.4 % — HIGH (ref 10.3–14.5)
RBC BLD AUTO: ABNORMAL
SODIUM SERPL-SCNC: 142 MMOL/L — SIGNIFICANT CHANGE UP (ref 135–145)
TSH SERPL-MCNC: 1.13 UIU/ML — SIGNIFICANT CHANGE UP (ref 0.27–4.2)
WBC # BLD: 4.2 K/UL — SIGNIFICANT CHANGE UP (ref 3.8–10.5)
WBC # FLD AUTO: 4.2 K/UL — SIGNIFICANT CHANGE UP (ref 3.8–10.5)

## 2022-11-29 PROCEDURE — 93282 PRGRMG EVAL IMPLANTABLE DFB: CPT | Mod: 26

## 2022-11-29 RX ORDER — APIXABAN 2.5 MG/1
2.5 TABLET, FILM COATED ORAL EVERY 12 HOURS
Refills: 0 | Status: DISCONTINUED | OUTPATIENT
Start: 2022-11-29 | End: 2022-12-01

## 2022-11-29 RX ORDER — FUROSEMIDE 40 MG
40 TABLET ORAL ONCE
Refills: 0 | Status: COMPLETED | OUTPATIENT
Start: 2022-11-29 | End: 2022-11-29

## 2022-11-29 RX ORDER — CARVEDILOL PHOSPHATE 80 MG/1
1 CAPSULE, EXTENDED RELEASE ORAL
Qty: 0 | Refills: 0 | DISCHARGE

## 2022-11-29 RX ORDER — IPRATROPIUM/ALBUTEROL SULFATE 18-103MCG
3 AEROSOL WITH ADAPTER (GRAM) INHALATION EVERY 6 HOURS
Refills: 0 | Status: DISCONTINUED | OUTPATIENT
Start: 2022-11-29 | End: 2022-12-01

## 2022-11-29 RX ORDER — APIXABAN 2.5 MG/1
1 TABLET, FILM COATED ORAL
Qty: 0 | Refills: 0 | DISCHARGE

## 2022-11-29 RX ORDER — SACUBITRIL AND VALSARTAN 24; 26 MG/1; MG/1
1 TABLET, FILM COATED ORAL
Qty: 0 | Refills: 0 | DISCHARGE

## 2022-11-29 RX ORDER — NITROGLYCERIN 6.5 MG
1 CAPSULE, EXTENDED RELEASE ORAL
Qty: 0 | Refills: 0 | DISCHARGE

## 2022-11-29 RX ORDER — ACETAMINOPHEN 500 MG
650 TABLET ORAL EVERY 6 HOURS
Refills: 0 | Status: DISCONTINUED | OUTPATIENT
Start: 2022-11-29 | End: 2022-12-01

## 2022-11-29 RX ORDER — CARVEDILOL PHOSPHATE 80 MG/1
6.25 CAPSULE, EXTENDED RELEASE ORAL EVERY 12 HOURS
Refills: 0 | Status: DISCONTINUED | OUTPATIENT
Start: 2022-11-29 | End: 2022-12-01

## 2022-11-29 RX ORDER — FUROSEMIDE 40 MG
40 TABLET ORAL DAILY
Refills: 0 | Status: DISCONTINUED | OUTPATIENT
Start: 2022-11-29 | End: 2022-12-01

## 2022-11-29 RX ORDER — SPIRONOLACTONE 25 MG/1
25 TABLET, FILM COATED ORAL DAILY
Refills: 0 | Status: DISCONTINUED | OUTPATIENT
Start: 2022-11-29 | End: 2022-12-01

## 2022-11-29 RX ORDER — SACUBITRIL AND VALSARTAN 24; 26 MG/1; MG/1
1 TABLET, FILM COATED ORAL
Refills: 0 | Status: DISCONTINUED | OUTPATIENT
Start: 2022-11-29 | End: 2022-12-01

## 2022-11-29 RX ADMIN — SACUBITRIL AND VALSARTAN 1 TABLET(S): 24; 26 TABLET, FILM COATED ORAL at 05:35

## 2022-11-29 RX ADMIN — Medication 3 MILLILITER(S): at 00:01

## 2022-11-29 RX ADMIN — Medication 40 MILLIGRAM(S): at 05:37

## 2022-11-29 RX ADMIN — APIXABAN 2.5 MILLIGRAM(S): 2.5 TABLET, FILM COATED ORAL at 05:32

## 2022-11-29 RX ADMIN — APIXABAN 2.5 MILLIGRAM(S): 2.5 TABLET, FILM COATED ORAL at 17:53

## 2022-11-29 RX ADMIN — CARVEDILOL PHOSPHATE 6.25 MILLIGRAM(S): 80 CAPSULE, EXTENDED RELEASE ORAL at 05:32

## 2022-11-29 RX ADMIN — SPIRONOLACTONE 25 MILLIGRAM(S): 25 TABLET, FILM COATED ORAL at 05:32

## 2022-11-29 NOTE — ED ADULT NURSE REASSESSMENT NOTE - NS ED NURSE REASSESS COMMENT FT1
patient positioned in wheelchair for comfort, VSS. Patient aware of plan and awaiting bed to tele floor. Daughter called and RN provided update on plan and status of patient.

## 2022-11-29 NOTE — ED ADULT NURSE REASSESSMENT NOTE - NS ED NURSE REASSESS COMMENT FT1
Assumed care of patient at this time. Patient alert and oriented X 3, transported to bathroom via wheelchair and returned to stretcher. No distress noted at this time, Patient on O2 @ 2L via NC, and tolerating well. Safety precautions maintained, will continue to monitor.

## 2022-11-29 NOTE — CONSULT NOTE ADULT - SUBJECTIVE AND OBJECTIVE BOX
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    States about a month ago she suffered an injury to her LLE which is non-healing but overall improved. Since that initial injury, she states she wasn't as mobile and active as she was prior. As the wound improved, she has more recently attempted to get up and return to her baseline activity level but has felt more tired and fatigued doing such activities a/w decreased appetite as well. She also states when she ambulates her O2 sat would occasionally drop to mid-80s and improve to mid-90s on rest.     She otherwise denies any F/C, HA, CP, abd pain, diarrhea or urinary symptoms. Denies any wheezing, cough, need to increased O2 at home d/t SOB or increase in LE edema.     In the ED she was administered 250cc IVF and Duoneb x1   (28 Nov 2022 23:55)      PAST MEDICAL & SURGICAL HISTORY:  Stented coronary artery      Atrial fibrillation      AICD (automatic cardioverter/defibrillator) present      CHF (congestive heart failure)      Pulmonary fibrosis      H/O abdominal hysterectomy      H/O hernia repair              MEDICATIONS:  apixaban 2.5 milliGRAM(s) Oral every 12 hours  carvedilol 6.25 milliGRAM(s) Oral every 12 hours  furosemide    Tablet 40 milliGRAM(s) Oral daily  sacubitril 24 mG/valsartan 26 mG 1 Tablet(s) Oral two times a day  spironolactone 25 milliGRAM(s) Oral daily      albuterol/ipratropium for Nebulization 3 milliLiter(s) Nebulizer every 6 hours PRN    acetaminophen     Tablet .. 650 milliGRAM(s) Oral every 6 hours PRN            FAMILY HISTORY:  Family history of CVA        Non-contributory    SOCIAL HISTORY:    [- ] Tobacco  [- ] Drugs  [- ] Alcohol    Allergies    amiodarone (Rash)  Augmentin (Rash)  Biaxin (Rash)  Cipro (Rash)  codeine (Rash)  latex (Unknown)  Levaquin (Rash)  morphine (Rash)  statins (Rash)    Intolerances    	    REVIEW OF SYSTEMS:  CONSTITUTIONAL: No fever  EYES: No eye pain, visual disturbances, or discharge  ENMT:  No difficulty hearing, tinnitus  NECK: No pain or stiffness  RESPIRATORY: No cough, wheezing,  CARDIOVASCULAR: No chest pain, palpitations, passing out, dizziness, or leg swelling  GASTROINTESTINAL:  No nausea, vomiting, diarrhea or constipation. No melena.  GENITOURINARY: No dysuria, hematuria  NEUROLOGICAL: No stroke like symptoms  SKIN: No burning or lesions, wound on LLE shin  ENDOCRINE: No heat or cold intolerance  MUSCULOSKELETAL: No joint pain or swelling  PSYCHIATRIC: No  anxiety, mood swings  HEME/LYMPH: No bleeding gums  ALLERGY AND IMMUNOLOGIC: No hives or eczema	    All other ROS negative    PHYSICAL EXAM:  T(C): 36.8 (11-29-22 @ 10:57), Max: 37.1 (11-29-22 @ 05:11)  HR: 57 (11-29-22 @ 10:57) (57 - 63)  BP: 100/62 (11-29-22 @ 10:57) (100/62 - 135/55)  RR: 18 (11-29-22 @ 10:57) (16 - 20)  SpO2: 100% (11-29-22 @ 10:57) (97% - 100%)  Wt(kg): --  I&O's Summary      Appearance: Normal	  HEENT:   Normal oral mucosa, EOMI	  Cardiovascular:  S1 S2, No JVD,    Respiratory: fine crackles at B/L bases  Psychiatry: Alert  Gastrointestinal:  Soft, Non-tender, + BS	  Skin: No rashes   Neurologic: Non-focal  Extremities:  No edema  Vascular: Peripheral pulses palpable    	    	  	  CARDIAC MARKERS:  Labs personally reviewed by me                                  9.5    4.20  )-----------( 91       ( 29 Nov 2022 07:22 )             33.4     11-29    142  |  98  |  30<H>  ----------------------------<  75  4.7   |  37<H>  |  0.88    Ca    9.1      29 Nov 2022 07:22  Phos  3.6     11-29  Mg     2.5     11-29    TPro  7.8  /  Alb  3.9  /  TBili  0.4  /  DBili  x   /  AST  33  /  ALT  16  /  AlkPhos  61  11-28          EKG: Personally reviewed by me - No ECG found in Chart or EMR. Will re-order.   Radiology: Personally reviewed by me -     < from: Xray Chest 1 View AP/PA (11.28.22 @ 18:48) >  Redemonstrated left chest wall AICD.  The lungs are clear.  There is no pleural effusion or pneumothorax.  The heart is normal in size  The visualized osseous structures demonstrate no acute pathology.    IMPRESSION:  Clear lungs.    < end of copied text >  < from: TTE with Doppler (w/Cont) (07.14.21 @ 14:18) >  EF (Visual Estimate): 45-50 %  Doppler Peak Velocity (m/sec): TV=3.7  ------------------------------------------------------------------------  Observations:  Mitral Valve: Calcified mitral chordae.  Mild-moderate  functional mitral regurgitation.  Aortic Valve/Aorta: Calcified aortic valve with normal  opening.  Normal aortic root size.  Left Atrium: Moderate left atrial enlargement.  Left Ventricle: Endocardial visualization enhanced with  intravenous injection of Ultrasonic Enhancing Agent  (Definity).  Images are suboptimal despite the use of Definity.  Moderate left ventricular enlargement.  Approximate ejection fraction 45-50%; this is a crude  estimation given the image quality.  The inferior wall appears akinetic.  Right Heart: Suboptimal visualization of the right heart.  A device wire is noted in the right heart.  Normal appearingtricuspid valve. Severe tricuspid  regurgitation.  Normal pulmonic valve.  Pericardium/Pleura: Trace pericardial effusion.  Hemodynamic: Estimated right atrial pressure is increased  (15-20 mmHg).  Severe pulmonary hypertension. Estimated PASP 70-75 mmHg.  ------------------------------------------------------------------------  Conclusions:  Endocardial visualization enhanced with intravenous  injection of Ultrasonic Enhancing Agent (Definity).  Images are suboptimal despite the use of Definity.  Approximate ejection fraction 45-50%; this is a crude  estimation given the image quality.  Suboptimal visualization of the right heart.  Severe pulmonary hypertension.  A device wire is noted in the right heart.    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 States about a month ago she suffered an injury to her LLE which is non-healing but overall improved. Since that initial injury, she states she wasn't as mobile and active as she was prior. As the wound improved, she has more recently attempted to get up and return to her baseline activity level but has felt more tired and fatigued doing such activities a/w decreased appetite as well. She also states when she ambulates her O2 sat would occasionally drop to mid-80s and improve to mid-90s on rest. She otherwise denies any F/C, HA, CP, abd pain, diarrhea or urinary symptoms. Denies any wheezing, cough, need to increased O2 at home d/t SOB or increase in LE edema.       Problem/Plan - 1:  ·  Problem: Acute systolic HF  Plan: - Most recent EF 40-45% in 2021 significantly improved s/p AICD and medical mgmt  - TTE from 2021 show EF of 40-50%, severe pulm HTN  - c/w Entresto, Coreg, Aldactone   - Will give one time dose if IV diuresis and reassess  - Daily weights, strict I&Os  - Obtain TTE  - Interrogate ICD    Problem/Plan - 2:  ·  Problem: Atrial fibrillation.  Plan: - hx A.fib  - AURELIO-VASC 5  - on eliquis 2.5 BID    Problem/Plan - 3:  ·  Problem: HTN   -c/w home meds   -BP controlled    Problem/Plan - 4:  ·  Problem: CAD, Hx of MI, PCI  - Denies CP or SOB  - ECG pending  - Trop 25  - c/w Coreg  - Will obtain TTE        Differential diagnosis and plan of care discussed with patient after the evaluation. Counseling on diet, nutritional counseling, weight management, exercise and medication compliance was done.   Advanced care planning/advanced directives discussed with patient/family. DNR status including forceful chest compressions to attempt to restart the heart, ventilator support/artificial breathing, electric shock, artificial nutrition, health care proxy, Molst form all discussed with pt. Pt wishes to consider. More than fifteen minutes spent on discussing advanced directives.     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    States about a month ago she suffered an injury to her LLE which is non-healing but overall improved. Since that initial injury, she states she wasn't as mobile and active as she was prior. As the wound improved, she has more recently attempted to get up and return to her baseline activity level but has felt more tired and fatigued doing such activities a/w decreased appetite as well. She also states when she ambulates her O2 sat would occasionally drop to mid-80s and improve to mid-90s on rest.     She otherwise denies any F/C, HA, CP, abd pain, diarrhea or urinary symptoms. Denies any wheezing, cough, need to increased O2 at home d/t SOB or increase in LE edema.     In the ED she was administered 250cc IVF and Duoneb x1   (28 Nov 2022 23:55)      PAST MEDICAL & SURGICAL HISTORY:  Stented coronary artery      Atrial fibrillation      AICD (automatic cardioverter/defibrillator) present      CHF (congestive heart failure)      Pulmonary fibrosis      H/O abdominal hysterectomy      H/O hernia repair              MEDICATIONS:  apixaban 2.5 milliGRAM(s) Oral every 12 hours  carvedilol 6.25 milliGRAM(s) Oral every 12 hours  furosemide    Tablet 40 milliGRAM(s) Oral daily  sacubitril 24 mG/valsartan 26 mG 1 Tablet(s) Oral two times a day  spironolactone 25 milliGRAM(s) Oral daily      albuterol/ipratropium for Nebulization 3 milliLiter(s) Nebulizer every 6 hours PRN    acetaminophen     Tablet .. 650 milliGRAM(s) Oral every 6 hours PRN            FAMILY HISTORY:  Family history of CVA        Non-contributory    SOCIAL HISTORY:    [- ] Tobacco  [- ] Drugs  [- ] Alcohol    Allergies    amiodarone (Rash)  Augmentin (Rash)  Biaxin (Rash)  Cipro (Rash)  codeine (Rash)  latex (Unknown)  Levaquin (Rash)  morphine (Rash)  statins (Rash)    Intolerances    	    REVIEW OF SYSTEMS:  CONSTITUTIONAL: No fever  EYES: No eye pain, visual disturbances, or discharge  ENMT:  No difficulty hearing, tinnitus  NECK: No pain or stiffness  RESPIRATORY: No cough, wheezing,  CARDIOVASCULAR: No chest pain, palpitations, passing out, dizziness, or leg swelling  GASTROINTESTINAL:  No nausea, vomiting, diarrhea or constipation. No melena.  GENITOURINARY: No dysuria, hematuria  NEUROLOGICAL: No stroke like symptoms  SKIN: No burning or lesions, wound on LLE shin  ENDOCRINE: No heat or cold intolerance  MUSCULOSKELETAL: No joint pain or swelling  PSYCHIATRIC: No  anxiety, mood swings  HEME/LYMPH: No bleeding gums  ALLERGY AND IMMUNOLOGIC: No hives or eczema	    All other ROS negative    PHYSICAL EXAM:  T(C): 36.8 (11-29-22 @ 10:57), Max: 37.1 (11-29-22 @ 05:11)  HR: 57 (11-29-22 @ 10:57) (57 - 63)  BP: 100/62 (11-29-22 @ 10:57) (100/62 - 135/55)  RR: 18 (11-29-22 @ 10:57) (16 - 20)  SpO2: 100% (11-29-22 @ 10:57) (97% - 100%)  Wt(kg): --  I&O's Summary      Appearance: Normal	  HEENT:   Normal oral mucosa, EOMI	  Cardiovascular:  S1 S2, No JVD,    Respiratory: fine crackles at B/L bases  Psychiatry: Alert  Gastrointestinal:  Soft, Non-tender, + BS	  Skin: No rashes   Neurologic: Non-focal  Extremities:  No edema  Vascular: Peripheral pulses palpable    	    	  	  CARDIAC MARKERS:  Labs personally reviewed by me                                  9.5    4.20  )-----------( 91       ( 29 Nov 2022 07:22 )             33.4     11-29    142  |  98  |  30<H>  ----------------------------<  75  4.7   |  37<H>  |  0.88    Ca    9.1      29 Nov 2022 07:22  Phos  3.6     11-29  Mg     2.5     11-29    TPro  7.8  /  Alb  3.9  /  TBili  0.4  /  DBili  x   /  AST  33  /  ALT  16  /  AlkPhos  61  11-28          EKG: Personally reviewed by me - No ECG found in Chart or EMR. Will re-order.   Radiology: Personally reviewed by me -     < from: Xray Chest 1 View AP/PA (11.28.22 @ 18:48) >  Redemonstrated left chest wall AICD.  The lungs are clear.  There is no pleural effusion or pneumothorax.  The heart is normal in size  The visualized osseous structures demonstrate no acute pathology.    IMPRESSION:  Clear lungs.    < end of copied text >  < from: TTE with Doppler (w/Cont) (07.14.21 @ 14:18) >  EF (Visual Estimate): 45-50 %  Doppler Peak Velocity (m/sec): TV=3.7  ------------------------------------------------------------------------  Observations:  Mitral Valve: Calcified mitral chordae.  Mild-moderate  functional mitral regurgitation.  Aortic Valve/Aorta: Calcified aortic valve with normal  opening.  Normal aortic root size.  Left Atrium: Moderate left atrial enlargement.  Left Ventricle: Endocardial visualization enhanced with  intravenous injection of Ultrasonic Enhancing Agent  (Definity).  Images are suboptimal despite the use of Definity.  Moderate left ventricular enlargement.  Approximate ejection fraction 45-50%; this is a crude  estimation given the image quality.  The inferior wall appears akinetic.  Right Heart: Suboptimal visualization of the right heart.  A device wire is noted in the right heart.  Normal appearingtricuspid valve. Severe tricuspid  regurgitation.  Normal pulmonic valve.  Pericardium/Pleura: Trace pericardial effusion.  Hemodynamic: Estimated right atrial pressure is increased  (15-20 mmHg).  Severe pulmonary hypertension. Estimated PASP 70-75 mmHg.  ------------------------------------------------------------------------  Conclusions:  Endocardial visualization enhanced with intravenous  injection of Ultrasonic Enhancing Agent (Definity).  Images are suboptimal despite the use of Definity.  Approximate ejection fraction 45-50%; this is a crude  estimation given the image quality.  Suboptimal visualization of the right heart.  Severe pulmonary hypertension.  A device wire is noted in the right heart.    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 States about a month ago she suffered an injury to her LLE which is non-healing but overall improved. Since that initial injury, she states she wasn't as mobile and active as she was prior. As the wound improved, she has more recently attempted to get up and return to her baseline activity level but has felt more tired and fatigued doing such activities a/w decreased appetite as well. She also states when she ambulates her O2 sat would occasionally drop to mid-80s and improve to mid-90s on rest. She otherwise denies any F/C, HA, CP, abd pain, diarrhea or urinary symptoms. Denies any wheezing, cough, need to increased O2 at home d/t SOB or increase in LE edema.       Problem/Plan - 1:  ·  Problem: Acute systolic HF  Plan: - Most recent EF 40-45% in 2021 significantly improved s/p AICD and medical mgmt  - TTE from 2021 show EF of 40-50%, severe pulm HTN  - c/w Entresto, Coreg, Aldactone   - Will give one time dose if IV diuresis and reassess, mild LE edema   - Daily weights, strict I&Os  - Obtain TTE  - Interrogate ICD    Problem/Plan - 2:  ·  Problem: Atrial fibrillation.  Plan: - hx A.fib  - AURELIO-VASC 5  - on eliquis 2.5 BID    Problem/Plan - 3:  ·  Problem: HTN   -c/w home meds   -BP controlled    Problem/Plan - 4:  ·  Problem: CAD, Hx of MI, PCI  - Denies CP or SOB  - ECG pending  - Trop 25  - c/w Coreg  - Will obtain TTE        Differential diagnosis and plan of care discussed with patient after the evaluation. Counseling on diet, nutritional counseling, weight management, exercise and medication compliance was done.   Advanced care planning/advanced directives discussed with patient/family. DNR status including forceful chest compressions to attempt to restart the heart, ventilator support/artificial breathing, electric shock, artificial nutrition, health care proxy, Molst form all discussed with pt. Pt wishes to consider. More than fifteen minutes spent on discussing advanced directives.     Jessica Joseph [facility]  Gamaliel Russell DO [facility]  Cardiovascular Medicine  [address] [place] [address] Dr, [address]  Available for call or text via [employer]  Office [phone]

## 2022-11-29 NOTE — PROCEDURE NOTE - ADDITIONAL PROCEDURE DETAILS
Indication for interrogation: Weakness  Presenting rhythm: AF with occasional ventricular pacing 50s  Measured data WNL, normal ICD function, Pt is NOT pacemaker dependent  Stored data revealed one episode of NSVT 8/2022  : 47%

## 2022-11-29 NOTE — PHYSICAL THERAPY INITIAL EVALUATION ADULT - PLANNED THERAPY INTERVENTIONS, PT EVAL
Immuknow has fallen a lot. Need top keep monitoring Immuknow, RFP, CBC and tac q2wks. CMV noted neg. Ask him to report any s/s infection. Goal: Pt will be able to negotiate one flight of stairs independently with single handrail and step to step pattern in 3 weeks./balance training/bed mobility training/gait training/strengthening/transfer training

## 2022-11-29 NOTE — ED ADULT NURSE REASSESSMENT NOTE - NS ED NURSE REASSESS COMMENT FT1
NP at [id number] will follow up with pt   pt set for echo Jeny NP at [id number] will follow up with pt   pt set for echo

## 2022-11-29 NOTE — ED ADULT NURSE REASSESSMENT NOTE - NS ED NURSE REASSESS COMMENT FT1
Spoke with pt's daughter, Leidy, who is requesting an update from pt's care team.  Spoke with Jeny ROGERS who will either come down or contact her via phone ([phone]).  Also confirmed that a wound care consult has been placed for patient.  Food tray to be provided.

## 2022-11-29 NOTE — PATIENT PROFILE ADULT - CAREGIVER PHONE NUMBER
Alert and oriented times four. Able to use call light and make needs known. Coccyx is healed and open to air. Droplet/chemotherapy precautions. Requested left breast dressing be changed this evening (was changed at 2030 on 9/19). Occasional incontinent of bladder. Brief worn. No BM this shift but passing flatus. Pills taken whole sitting up with thickened liquid. Assist of one with Lea Steady. Calm and cooperative. Ate 75% of breakfast and lunch.    [phone]

## 2022-11-29 NOTE — PHYSICAL THERAPY INITIAL EVALUATION ADULT - ADDITIONAL COMMENTS
Per pt and dtr at bedside, pt lives in co-op with  and dtr (who has disability). Reports being independent with functional mobility prior to admission. Has RW, shower chair, and grab bars in shower.

## 2022-11-29 NOTE — PHYSICAL THERAPY INITIAL EVALUATION ADULT - PERTINENT HX OF CURRENT PROBLEM, REHAB EVAL
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  when she ambulates her O2 sat would occasionally drop to mid-80s and improve to mid-90s on rest.    Dx: FTT / weakness and decreased activity toleratce

## 2022-11-29 NOTE — PATIENT PROFILE ADULT - FALL HARM RISK - HARM RISK INTERVENTIONS
Communicate Risk of Fall with Harm to all staff/Reinforce activity limits and safety measures with patient and family/Tailored Fall Risk Interventions/Visual Cue: Yellow wristband and red socks/Bed in lowest position, wheels locked, appropriate side rails in place/Call bell, personal items and telephone in reach/Instruct patient to call for assistance before getting out of bed or chair/Non-slip footwear when patient is out of bed/[place] to call system/Physically safe environment - no spills, clutter or unnecessary equipment/Purposeful Proactive Rounding/Room/bathroom lighting operational, light cord in reach Assistance with ambulation/Monitor for mental status changes/Monitor gait and stability/Provide patient with walking aids - walker, cane, crutches/Reinforce activity limits and safety measures with patient and family/Reorient to person, place and time as needed/Review medications for side effects contributing to fall risk/Sit up slowly, dangle for a short time, stand at bedside before walking/Use of alarms - bed, chair and/or voice tab/Visual Cue: Yellow wristband and red socks/Bed in lowest position, wheels locked, appropriate side rails in place/Call bell, personal items and telephone in reach/Instruct patient to call for assistance before getting out of bed or chair/Non-slip footwear when patient is out of bed/[place] to call system/Physically safe environment - no spills, clutter or unnecessary equipment/Purposeful Proactive Rounding/Room/bathroom lighting operational, light cord in reach

## 2022-11-30 LAB
ANION GAP SERPL CALC-SCNC: 7 MMOL/L — SIGNIFICANT CHANGE UP (ref 5–17)
BUN SERPL-MCNC: 26 MG/DL — HIGH (ref 7–23)
CALCIUM SERPL-MCNC: 9 MG/DL — SIGNIFICANT CHANGE UP (ref 8.4–10.5)
CHLORIDE SERPL-SCNC: 99 MMOL/L — SIGNIFICANT CHANGE UP (ref 96–108)
CO2 SERPL-SCNC: 38 MMOL/L — HIGH (ref 22–31)
CREAT SERPL-MCNC: 0.87 MG/DL — SIGNIFICANT CHANGE UP (ref 0.5–1.3)
CULTURE RESULTS: SIGNIFICANT CHANGE UP
EGFR: 67 ML/MIN/1.73M2 — SIGNIFICANT CHANGE UP
GLUCOSE SERPL-MCNC: 75 MG/DL — SIGNIFICANT CHANGE UP (ref 70–99)
HCT VFR BLD CALC: 35.7 % — SIGNIFICANT CHANGE UP (ref 34.5–45)
HGB BLD-MCNC: 10.2 G/DL — LOW (ref 11.5–15.5)
MCHC RBC-ENTMCNC: 25.6 PG — LOW (ref 27–34)
MCHC RBC-ENTMCNC: 28.6 GM/DL — LOW (ref 32–36)
MCV RBC AUTO: 89.7 FL — SIGNIFICANT CHANGE UP (ref 80–100)
MRSA PCR RESULT.: SIGNIFICANT CHANGE UP
NRBC # BLD: 0 /100 WBCS — SIGNIFICANT CHANGE UP (ref 0–0)
PLATELET # BLD AUTO: 91 K/UL — LOW (ref 150–400)
POTASSIUM SERPL-MCNC: 4.8 MMOL/L — SIGNIFICANT CHANGE UP (ref 3.5–5.3)
POTASSIUM SERPL-SCNC: 4.8 MMOL/L — SIGNIFICANT CHANGE UP (ref 3.5–5.3)
RBC # BLD: 3.98 M/UL — SIGNIFICANT CHANGE UP (ref 3.8–5.2)
RBC # FLD: 15.3 % — HIGH (ref 10.3–14.5)
S AUREUS DNA NOSE QL NAA+PROBE: SIGNIFICANT CHANGE UP
SODIUM SERPL-SCNC: 144 MMOL/L — SIGNIFICANT CHANGE UP (ref 135–145)
SPECIMEN SOURCE: SIGNIFICANT CHANGE UP
WBC # BLD: 4.37 K/UL — SIGNIFICANT CHANGE UP (ref 3.8–10.5)
WBC # FLD AUTO: 4.37 K/UL — SIGNIFICANT CHANGE UP (ref 3.8–10.5)

## 2022-11-30 PROCEDURE — 99222 1ST HOSP IP/OBS MODERATE 55: CPT

## 2022-11-30 PROCEDURE — 93306 TTE W/DOPPLER COMPLETE: CPT | Mod: 26

## 2022-11-30 RX ORDER — CHLORHEXIDINE GLUCONATE 213 G/1000ML
1 SOLUTION TOPICAL
Refills: 0 | Status: DISCONTINUED | OUTPATIENT
Start: 2022-11-30 | End: 2022-12-01

## 2022-11-30 RX ADMIN — APIXABAN 2.5 MILLIGRAM(S): 2.5 TABLET, FILM COATED ORAL at 06:31

## 2022-11-30 RX ADMIN — APIXABAN 2.5 MILLIGRAM(S): 2.5 TABLET, FILM COATED ORAL at 17:34

## 2022-11-30 RX ADMIN — Medication 40 MILLIGRAM(S): at 06:31

## 2022-11-30 RX ADMIN — SACUBITRIL AND VALSARTAN 1 TABLET(S): 24; 26 TABLET, FILM COATED ORAL at 06:50

## 2022-11-30 RX ADMIN — SPIRONOLACTONE 25 MILLIGRAM(S): 25 TABLET, FILM COATED ORAL at 06:31

## 2022-11-30 RX ADMIN — CARVEDILOL PHOSPHATE 6.25 MILLIGRAM(S): 80 CAPSULE, EXTENDED RELEASE ORAL at 06:31

## 2022-11-30 RX ADMIN — CARVEDILOL PHOSPHATE 6.25 MILLIGRAM(S): 80 CAPSULE, EXTENDED RELEASE ORAL at 17:34

## 2022-11-30 RX ADMIN — SACUBITRIL AND VALSARTAN 1 TABLET(S): 24; 26 TABLET, FILM COATED ORAL at 17:34

## 2022-11-30 NOTE — CONSULT NOTE ADULT - NS ATTEND AMEND GEN_ALL_CORE FT
Patient care and plan discussed and reviewed with Advanced Care Provider. Plan as outlined above edited by me to reflect our discussion.
Pt seen and examined with ACP.  Assessment and plan reviewed and discussed.  Agree with above.    Status of wounds and treatment recommendations d/w  pt.  All questions answered.   Pt expressed understanding.    I spent 50   minutes face to face w/ this pt of which more than 50% of the time was spent counseling & coordinating care of this pt.

## 2022-11-30 NOTE — CONSULT NOTE ADULT - SUBJECTIVE AND OBJECTIVE BOX
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 States about a month ago she suffered an injury to her LLE which is non-healing but overall improved. Since that initial injury, she states she wasn't as mobile and active as she was prior. As the wound improved, she has more recently attempted to get up and return to her baseline activity level but has felt more tired and fatigued doing such activities anf decreased appetite as well. She also states when she ambulates her O2 sat would occasionally drop to mid-80s and improve to mid-90s on rest.  She otherwise denies any F/C, HA, CP, abd pain, diarrhea or urinary symptoms. Denies any wheezing, cough, need to increased O2 at home d/t SOB or increase in LE edema. In the ED she was administered 250cc IVF and Duoneb x1  Wound consult requested by team to assist w/ management of LLE wound.  Pt seen by Cat for varicose veins.  Pt did not want procedure.  Pt sometimes wears compression garments / ace wraps.  Pt w/o c/o pain, drainage, odor, color change.  Improving swelling. Wound nearly healed w/ dry scab.  Offloading and pericare initiated.  All questions asked and answered to pt's expressed understanding and satisfaction.    Current Diet: Diet, DASH/TLC:   Sodium & Cholesterol Restricted (11-29-22 @ 01:05)      PAST MEDICAL & SURGICAL HISTORY:  Stented coronary artery    Atrial fibrillation    AICD (automatic cardioverter/defibrillator) present    CHF (congestive heart failure)    Pulmonary fibrosis    s/p abdominal hysterectomy    s/p hernia repair      REVIEW OF SYSTEMS: General/ Skin/Vasc/ MSK: see HPI  All other systems negative    MEDICATIONS  (STANDING):  apixaban 2.5 milliGRAM(s) Oral every 12 hours  carvedilol 6.25 milliGRAM(s) Oral every 12 hours  chlorhexidine 2% Cloths 1 Application(s) Topical <User Schedule>  furosemide  Tablet 40 milliGRAM(s) Oral daily  sacubitril 24 mG/valsartan 26 mG 1 Tablet(s) Oral two times a day  spironolactone 25 milliGRAM(s) Oral daily    MEDICATIONS  (PRN):  acetaminophen Tablet 650 milliGRAM(s) Oral every 6 hours PRN Temp greater or equal to 38C (100.4F), Mild Pain (1 - 3)  albuterol/ipratropium for Nebulization 3 milliLiter(s) Nebulizer every 6 hours PRN Shortness of Breath and/or Wheezing      Allergies  amiodarone (Rash)  Augmentin (Rash)  Biaxin (Rash)  Cipro (Rash)  codeine (Rash)  latex (Unknown)  Levaquin (Rash)  morphine (Rash)  statins (Rash)    SOCIAL HISTORY:  ; 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; Former smoker, No current smoking, ETOH, drugs    FAMILY HISTORY:  no h/o PVD or wound healing or skin/ significant problems      (+)Family history of CVA      PHYSICAL EXAM:  Vital Signs Last 24 Hrs  T(C): 36.8 (30 Nov 2022 11:06), Max: 36.8 (30 Nov 2022 11:06)  T(F): 98.3 (30 Nov 2022 11:06), Max: 98.3 (30 Nov 2022 11:06)  HR: 67 (30 Nov 2022 11:06) (49 - 75)  BP: 102/67 (30 Nov 2022 11:06) (102/67 - 135/62)  BP(mean): --  RR: 18 (30 Nov 2022 11:06) (18 - 18)  SpO2: 93% (30 Nov 2022 11:06) (93% - 100%)    Parameters below as of 30 Nov 2022 11:06  Patient On (Oxygen Delivery Method): nasal cannula  O2 Flow (L/min): 2    NAD,  A&Ox3, WD/ WN/ Disheveled  Versa Care P500 bed  HEENT:  NC/AT, EOMI, sclera clear, mucosa moist, throat clear, trachea midline, neck supple  Respiratory: nonlabored w/ equal chest rise  Gastrointestinal soft NT/ND   : (+) purewick  Neurology:  strength & sensation grossly intact  Psych: calm/ appropriate  Musculoskeletal: FROM, no deformities/ contractures  Vascular: BLE equally warm,  no cyanosis, clubbing,      BLE edema equal, weak BLE DP pulses palpable     no acute ischemia noted     BLE hemosiderin staining w/ dilated varicose veins     Lt medial shin w/ 0.5cm x 0.8cm x 0.1cm       lifting scab removed while cleaning w/ mechanical debridement           now w/ denuded moist skin   No odor, erythema, increased warmth, tenderness, induration, fluctuance, nor crepitus  Skin: thin, frail,  ecchymosis w/o hematoma      LABS/ CULTURES/ RADIOLOGY:                        10.2   4.37  )-----------( 91       ( 30 Nov 2022 05:46 )             35.7       144  |  99  |  26  ----------------------------<  75      [11-30-22 @ 05:46]  4.8   |  38  |  0.87        Ca     9.0     [11-30-22 @ 05:46]      Mg     2.5     [11-29-22 @ 07:22]      Phos  3.6     [11-29-22 @ 07:22]    TPro  7.8  /  Alb  3.9  /  TBili  0.4  /  DBili  x   /  AST  33  /  ALT  16  /  AlkPhos  61  [11-28-22 @ 18:38]    PT/INR: PT 15.0 , INR 1.30       [11-28-22 @ 18:38]  PTT: 29.9       [11-28-22 @ 18:38]

## 2022-11-30 NOTE — CONSULT NOTE ADULT - ASSESSMENT
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 (on home 2L NC), COPD presented w/ SOB, deconditioning and FTT     Wound Consult requested to assist w/ management of BLE PVD  BLE varicose veins  LLE wound    LLE wound- cavilon + Allevyn Foam QOD  BLE elevation & Compression  Consider f/u with Vascular vs Wound Center as outpatient for tx of Varicose Veins  Moisturize intact skin w/ SWEEN cream BID  Nutrition Consult for optimization        encourage high quality protein, MVI & Vit C to promote wound healing  Continue turning and positioning w/ offloading assistive devices as per protocol  Buttocks/ Sacrum Va BID  and prn soiling        Continue w/ attends under pads and Pericare as per protocol  Waffle Cushion to chair when oob to chair  Continue w/ low air loss pressure redistribution bed surface   Care as per medicine, will follow w/ you  Upon discharge f/u as outpatient at Wound Center [address] [phone]  Seen w/ attng and D/w team & RN  Thank you for this consult  Nicky Mi PA-C CWS [id number]  I spent 50minutes face to face w/ this pt of which more than 50% of the time was spent counseling & coordinating care of this pt.  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 (on home 2L NC), COPD presented w/ SOB, deconditioning and FTT     Wound Consult requested to assist w/ management of:   BLE PVD  BLE varicose veins  LLE wound    LLE wound- cavilon + Allevyn Foam QOD  BLE elevation & Compression  Consider f/u with Vascular vs Wound Center as outpatient for tx of Varicose Veins  Moisturize intact skin w/ SWEEN cream BID  Nutrition Consult for optimization        encourage high quality protein, MVI & Vit C to promote wound healing  Continue turning and positioning w/ offloading assistive devices as per protocol  Buttocks/ Sacrum Va BID  and prn soiling        Continue w/ attends under pads and Pericare as per protocol  Waffle Cushion to chair when oob to chair  Continue w/ low air loss pressure redistribution bed surface   Care as per medicine, will follow w/ you  Upon discharge f/u as outpatient at Wound Center [address] [facility] [phone]  Seen w/ attng and D/w team & RN  Thank you for this consult  Nicky Mi PA-C CWS [id number]

## 2022-11-30 NOTE — CONSULT NOTE ADULT - SUBJECTIVE AND OBJECTIVE BOX
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    CHF  with AICD  on lasix and spironolactone    atrial fibrillation on Eliquis    LE  edema, varicose veins    left shin ulcer    admitted with weakness fatigue, FTT    states she feels better    no fever, sputum production chest pain    Vital Signs Last 24 Hrs  T(C): 36.8 (30 Nov 2022 11:06), Max: 36.8 (30 Nov 2022 11:06)  T(F): 98.3 (30 Nov 2022 11:06), Max: 98.3 (30 Nov 2022 11:06)  HR: 67 (30 Nov 2022 11:06) (49 - 75)  BP: 102/67 (30 Nov 2022 11:06) (102/67 - 135/62)  BP(mean): --  RR: 18 (30 Nov 2022 11:06) (18 - 18)  SpO2: 93% (30 Nov 2022 11:06) (93% - 100%)    Parameters below as of 30 Nov 2022 11:06  Patient On (Oxygen Delivery Method): nasal cannula  O2 Flow (L/min): 2      lungs equal diminished aeration bilaterally   cor irreg  abd nt soft    extr mild edema varicose veins LE   no erythema      IMPRESSION    CHF  pulm scarring due to amiodarone    uses O2     stable respiratory status    no significant pulmonary edema on CXR    PLAN    wound care    adjust cardiac regimen, monitor BP on multiple cardiac meds    continue O2      Labs, meds radiographic studies reviewed    Dominic Mccray MD    PULMONARY    MD Obi Frederick MD George Haralambou, MD    [phone]

## 2022-12-01 ENCOUNTER — TRANSCRIPTION ENCOUNTER (OUTPATIENT)
Age: 82
End: 2022-12-01

## 2022-12-01 VITALS — DIASTOLIC BLOOD PRESSURE: 58 MMHG | SYSTOLIC BLOOD PRESSURE: 106 MMHG | HEART RATE: 57 BPM

## 2022-12-01 LAB
HCT VFR BLD CALC: 34.9 % — SIGNIFICANT CHANGE UP (ref 34.5–45)
HCT VFR BLD CALC: 35.1 % — SIGNIFICANT CHANGE UP (ref 34.5–45)
HGB BLD-MCNC: 10 G/DL — LOW (ref 11.5–15.5)
HGB BLD-MCNC: 9.7 G/DL — LOW (ref 11.5–15.5)
MCHC RBC-ENTMCNC: 25.2 PG — LOW (ref 27–34)
MCHC RBC-ENTMCNC: 25.7 PG — LOW (ref 27–34)
MCHC RBC-ENTMCNC: 27.8 GM/DL — LOW (ref 32–36)
MCHC RBC-ENTMCNC: 28.5 GM/DL — LOW (ref 32–36)
MCV RBC AUTO: 90.2 FL — SIGNIFICANT CHANGE UP (ref 80–100)
MCV RBC AUTO: 90.6 FL — SIGNIFICANT CHANGE UP (ref 80–100)
NRBC # BLD: 0 /100 WBCS — SIGNIFICANT CHANGE UP (ref 0–0)
NRBC # BLD: 0 /100 WBCS — SIGNIFICANT CHANGE UP (ref 0–0)
PLATELET # BLD AUTO: 80 K/UL — LOW (ref 150–400)
PLATELET # BLD AUTO: 84 K/UL — LOW (ref 150–400)
RBC # BLD: 3.85 M/UL — SIGNIFICANT CHANGE UP (ref 3.8–5.2)
RBC # BLD: 3.89 M/UL — SIGNIFICANT CHANGE UP (ref 3.8–5.2)
RBC # FLD: 15.1 % — HIGH (ref 10.3–14.5)
RBC # FLD: 15.2 % — HIGH (ref 10.3–14.5)
WBC # BLD: 3.93 K/UL — SIGNIFICANT CHANGE UP (ref 3.8–10.5)
WBC # BLD: 4.43 K/UL — SIGNIFICANT CHANGE UP (ref 3.8–10.5)
WBC # FLD AUTO: 3.93 K/UL — SIGNIFICANT CHANGE UP (ref 3.8–10.5)
WBC # FLD AUTO: 4.43 K/UL — SIGNIFICANT CHANGE UP (ref 3.8–10.5)

## 2022-12-01 PROCEDURE — 36415 COLL VENOUS BLD VENIPUNCTURE: CPT

## 2022-12-01 PROCEDURE — 80053 COMPREHEN METABOLIC PANEL: CPT

## 2022-12-01 PROCEDURE — 85027 COMPLETE CBC AUTOMATED: CPT

## 2022-12-01 PROCEDURE — 87640 STAPH A DNA AMP PROBE: CPT

## 2022-12-01 PROCEDURE — 87641 MR-STAPH DNA AMP PROBE: CPT

## 2022-12-01 PROCEDURE — 83735 ASSAY OF MAGNESIUM: CPT

## 2022-12-01 PROCEDURE — 84145 PROCALCITONIN (PCT): CPT

## 2022-12-01 PROCEDURE — 99285 EMERGENCY DEPT VISIT HI MDM: CPT

## 2022-12-01 PROCEDURE — 85730 THROMBOPLASTIN TIME PARTIAL: CPT

## 2022-12-01 PROCEDURE — 83880 ASSAY OF NATRIURETIC PEPTIDE: CPT

## 2022-12-01 PROCEDURE — 84484 ASSAY OF TROPONIN QUANT: CPT

## 2022-12-01 PROCEDURE — 85025 COMPLETE CBC W/AUTO DIFF WBC: CPT

## 2022-12-01 PROCEDURE — 87086 URINE CULTURE/COLONY COUNT: CPT

## 2022-12-01 PROCEDURE — 81001 URINALYSIS AUTO W/SCOPE: CPT

## 2022-12-01 PROCEDURE — 94640 AIRWAY INHALATION TREATMENT: CPT

## 2022-12-01 PROCEDURE — 84443 ASSAY THYROID STIM HORMONE: CPT

## 2022-12-01 PROCEDURE — 84100 ASSAY OF PHOSPHORUS: CPT

## 2022-12-01 PROCEDURE — 71045 X-RAY EXAM CHEST 1 VIEW: CPT

## 2022-12-01 PROCEDURE — 80048 BASIC METABOLIC PNL TOTAL CA: CPT

## 2022-12-01 PROCEDURE — C8929: CPT

## 2022-12-01 PROCEDURE — 0225U NFCT DS DNA&RNA 21 SARSCOV2: CPT

## 2022-12-01 PROCEDURE — 97162 PT EVAL MOD COMPLEX 30 MIN: CPT

## 2022-12-01 PROCEDURE — 85610 PROTHROMBIN TIME: CPT

## 2022-12-01 RX ADMIN — Medication 40 MILLIGRAM(S): at 06:25

## 2022-12-01 RX ADMIN — CARVEDILOL PHOSPHATE 6.25 MILLIGRAM(S): 80 CAPSULE, EXTENDED RELEASE ORAL at 06:25

## 2022-12-01 RX ADMIN — SACUBITRIL AND VALSARTAN 1 TABLET(S): 24; 26 TABLET, FILM COATED ORAL at 06:26

## 2022-12-01 RX ADMIN — APIXABAN 2.5 MILLIGRAM(S): 2.5 TABLET, FILM COATED ORAL at 06:25

## 2022-12-01 RX ADMIN — CHLORHEXIDINE GLUCONATE 1 APPLICATION(S): 213 SOLUTION TOPICAL at 06:28

## 2022-12-01 RX ADMIN — SPIRONOLACTONE 25 MILLIGRAM(S): 25 TABLET, FILM COATED ORAL at 06:25

## 2022-12-01 NOTE — DISCHARGE NOTE PROVIDER - NSDCFUADDINST_GEN_ALL_CORE_FT
Low salt low fat diet.  Follow up with Cardiologist/Hematologist in 3- 4 days for repeat blood work to check platelet level

## 2022-12-01 NOTE — CHART NOTE - NSCHARTNOTEFT_GEN_A_CORE
RD CHF education chart note    Patient was visited by RD for CHF education. Heart failure education provided to the patient in detail. Discussed heart failure nutrition therapy, sodium and fluid intake, importance of diet adherence, daily weights monitoring with the patient. Reinforced importance of weight gain parameters and importance of contacting MD’s about weight changes. Provided handouts on heart failure nutrition therapy, reading heart healthy nutrition labels, heart healthy shopping tips and low sodium food list. Patient verbalized understanding demonstrated by teach back method. RD contact information left with patient for any future questioning.    RD remains available to review diet education and adjust diet recommendations as needed.  Amira Israel RD CDN #[phone]

## 2022-12-01 NOTE — DISCHARGE NOTE NURSING/CASE MANAGEMENT/SOCIAL WORK - NSDCVIVACCINE_GEN_ALL_CORE_FT
Tdap; 09-Jul-2022 01:40; Norah Sheridan (RN); [employer]; [id number] (Exp. Date: 18-Jan-2024); IntraMuscular; Deltoid Left.; 0.5 milliLiter(s); VIS (VIS Published: 09-May-2013, VIS Presented: 09-Jul-2022);

## 2022-12-01 NOTE — DISCHARGE NOTE PROVIDER - NSDCFUADDAPPT_GEN_ALL_CORE_FT
APPTS ARE READY TO BE MADE: [x] YES    Best Family or Patient Contact (if needed):    Additional Information about above appointments (if needed):    1: Cardiologist - Dr. Vidal in 1 week  2:   3:     Other comments or requests:    APPTS ARE READY TO BE MADE: [x] YES    Best Family or Patient Contact (if needed):    Additional Information about above appointments (if needed):    1: Cardiologist - Dr. Vidal in 1 week  2:   3:     Other comments or requests:   Patient was provided with follow up request details for Dr. Kandi Mc(patient's hematologist) and Dr. Ede Vidal. Patient was advised to call to schedule follow up within specified time frame.

## 2022-12-01 NOTE — DISCHARGE NOTE PROVIDER - NSDCCPCAREPLAN_GEN_ALL_CORE_FT
PRINCIPAL DISCHARGE DIAGNOSIS  Diagnosis: Dyspnea due to congestive heart failure  Assessment and Plan of Treatment: Acute on Chronic systolic Heart Failure.   Echo unchanged from previous with EF 40%, mod MR, mod TR (was severe in 2021 likely d/t volume overload at that time), severe diastolic dysfunction, severe pulm HTN  - ICD interrogated - device functioning properly  - Ypu got 1 dose of IV lasix with significant improvement.  -  Continue Entresto, Coreg, Aldactone.  Weigh yourself daily.  If you gain 3lbs in 3 days, or 5lbs in a week call your Health Care Provider.  Do not eat or drink foods containing more than 2000mg of salt (sodium) in your diet every day.  Call your Health Care Provider if you have any swelling or increased swelling in your feet, ankles, and/or stomach.  Take all of your medication as directed.  If you become dizzy call your Health Care Provider.        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 the past  Follow up with Pulmonary specialist -    Diagnosis: Physical deconditioning  Assessment and Plan of Treatment: Likley due to  decreased physical activity due to recent wound  Extensive discussion about self-limiting activity and importance of appropriate sleep structure  - Recommends Home PT      Diagnosis: Thrombocytopenia  Assessment and Plan of Treatment: Your platelets are low   - Appear recent baseline ~ 100  - Repeat Platelets stable - 80 - > 84  Follow up with Hematologist early next week - you reported that you have a hematologist who you want to follow up   Seek immediate medical help for any signs of bleed noted in urine, stool or easy bruising or bloody rash    Diagnosis: Chronic atrial fibrillation  Assessment and Plan of Treatment: Continue Eliquis 2.5 BID an Coreg.  Atrial fibrillation is the most common heart rhythm problem.  The condition puts you at risk for has stroke and heart attack  You were started on blood thinners to prevent possible clot and stroke complications.   Monitor for any signs of bleeding and avoid injury while on this medication  If any bleeding persistent and symptomatic stop the medication and notify your doctor immediately.  It helps if you control your blood pressure, not drink more than 1-2 alcohol drinks per day, cut down on caffeine, getting treatment for over active thyroid gland, and get regular exercise  Call your doctor if you feel your heart racing or beating unusually, chest tightness or pain, lightheaded, faint, shortness of breath especially with exercise  It is important to take your heart medication as prescribed      Diagnosis: Hypertension  Assessment and Plan of Treatment: Blood pressure is controlled   Continue home meds.  Take medications for your blood pressure as recommended.  Eat a heart healthy diet that is low in saturated fats and salt, and includes whole grains, fruits, vegetables and lean protein   Exercise regularly (consult with your physician or cardiologist first); maintain a heart healthy weight.   Continue to follow with your primary physician or cardiologist.   Seek medical help for dizziness, Lightheadedness, Blurry vision, Headache, Chest pain, Shortness of breath  Follow up with your medical doctor to establish long term blood pressure treatment goals.      Diagnosis: CAD (coronary artery disease)  Assessment and Plan of Treatment: Continue Coreg  Coronary artery disease is a condition where the arteries the supply the heart muscle get clogges with fatty deposits & puts you at risk for a heart attack  Call your doctor if you have any new pain, pressure, or discomfort in the center of your chest, pain, tingling or discomfort in arms, back, neck, jaw, or stomach, shortness of breath, nausea, vomiting, burping or heartburn, sweating, cold and clammy skin, racing or abnormal heartbeat for more than 10 minutes or if they keep coming & going.  Call 911 and do not tr to get to hospital by care  You can help yourself with lefestyle changes (quitting smoking if you smoke), eat lots of fruits & vegetables & low fat dairy products, not a lot of meat & fatty foods, walk or some form of physical activity most days of the week, lose weight if you are overweight  Take your cardiac medication as prescribed to lower cholesterol, to lower blood pressure, aspirin to prevent blood clots, and diabetes control  Make sure to keep appointments with doctor for cardiac follow up care

## 2022-12-01 NOTE — PROGRESS NOTE ADULT - SUBJECTIVE AND OBJECTIVE BOX
DATE OF SERVICE: 12-01-22 @ 11:22    Patient is a 82y old  Female who presents with a chief complaint of Deconditioning (01 Dec 2022 09:57)      INTERVAL HISTORY: Feels ok.     REVIEW OF SYSTEMS:  CONSTITUTIONAL: No weakness  EYES/ENT: No visual changes;  No throat pain   NECK: No pain or stiffness  RESPIRATORY: No cough, wheezing; No shortness of breath  CARDIOVASCULAR: No chest pain or palpitations  GASTROINTESTINAL: No abdominal  pain. No nausea, vomiting, or hematemesis  GENITOURINARY: No dysuria, frequency or hematuria  NEUROLOGICAL: No stroke like symptoms  SKIN: No rashes    TELEMETRY Personally reviewed: V-Paced/AF 50-60  	  MEDICATIONS:  carvedilol 6.25 milliGRAM(s) Oral every 12 hours  furosemide    Tablet 40 milliGRAM(s) Oral daily  sacubitril 24 mG/valsartan 26 mG 1 Tablet(s) Oral two times a day  spironolactone 25 milliGRAM(s) Oral daily        PHYSICAL EXAM:  T(C): 36.6 (12-01-22 @ 04:53), Max: 36.6 (12-01-22 @ 04:53)  HR: 60 (12-01-22 @ 04:53) (51 - 60)  BP: 116/69 (12-01-22 @ 04:53) (103/61 - 116/69)  RR: 20 (12-01-22 @ 04:53) (18 - 20)  SpO2: 100% (12-01-22 @ 04:53) (94% - 100%)  Wt(kg): --  I&O's Summary    30 Nov 2022 07:01  -  01 Dec 2022 07:00  --------------------------------------------------------  IN: 1080 mL / OUT: 800 mL / NET: 280 mL    01 Dec 2022 07:01  -  01 Dec 2022 11:22  --------------------------------------------------------  IN: 240 mL / OUT: 0 mL / NET: 240 mL          Appearance: In no distress	  HEENT:    PERRL, EOMI	  Cardiovascular:  S1 S2, No JVD  Respiratory: Lungs clear to auscultation	  Gastrointestinal:  Soft, Non-tender, + BS	  Vascularature:  No edema of LE  Psychiatric: Appropriate affect   Neuro: no acute focal deficits                               10.0   3.93  )-----------( 80       ( 01 Dec 2022 06:20 )             35.1     11-30    144  |  99  |  26<H>  ----------------------------<  75  4.8   |  38<H>  |  0.87    Ca    9.0      30 Nov 2022 05:46          Labs personally reviewed      ASSESSMENT/PLAN: 	    Pt is an 81yo F w/ PMH of HFrEF, CAD s/p stent placement, cardiac arrest '98, Afib on Eliquis, s/p AICD ([employer]), 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 States about a month ago she suffered an injury to her LLE which is non-healing but overall improved. Since that initial injury, she states she wasn't as mobile and active as she was prior. As the wound improved, she has more recently attempted to get up and return to her baseline activity level but has felt more tired and fatigued doing such activities a/w decreased appetite as well. She also states when she ambulates her O2 sat would occasionally drop to mid-80s and improve to mid-90s on rest. She otherwise denies any F/C, HA, CP, abd pain, diarrhea or urinary symptoms. Denies any wheezing, cough, need to increased O2 at home d/t SOB or increase in LE edema.       Problem/Plan - 1:  ·  Problem: Acute systolic HF  Plan: - Most recent EF 40-45% in 2021 significantly improved s/p AICD and medical mgmt  - TTE from 2021 show EF of 40-50%, severe pulm HTN  - c/w Entresto, Coreg, Aldactone   - s/p 1 dose of IV lasix with significant improvement. Now with no LE edema and lungs CTA.   - Daily weights, strict I&Os  - TTE unchanged from previous with EF 40%, mod MR, mod TR (was severe in 2021 likely d/t volume overload at that time), severe diastolic dysfunction, severe pulm HTN  - ICD interrogated - device functioning properly    Problem/Plan - 2:  ·  Problem: Atrial fibrillation.  Plan: - hx A.fib  - AURELIO-VASC 5  - on eliquis 2.5 BID    Problem/Plan - 3:  ·  Problem: HTN   -c/w home meds   -BP controlled    Problem/Plan - 4:  ·  Problem: CAD, Hx of MI, PCI  - Denies CP or SOB  - Trop 25  - c/w Coreg  - TTE unchanged from previous with EF 40%, mod MR, mod TR (was severe in 2021 likely d/t volume overload at that time), severe diastolic dysfunction, severe pulm HTN    Problem/Plan - 5  ·  Problem: Deconditioning  - Likely d/t decreased physical activity d/t recent wound  - Extensive discussion about self-limiting activity and importance of appropriate sleep structure  - Rec Home PT/OT    Problem/Plan - 6  ·  Problem: Thrombocytopenia   - Plt downtrending  - Appear recent baseline ~ 100  - Will recheck CBC and if Plt stable plan for DC later today with OP hematology follow up.    Updated patient and daughter about test results and Plan of care. In agreement.         MOHSEN Avila-KEN Russell DO [facility]  Cardiovascular Medicine  [address] [place] [address], [address]  Available through call or text on [employer]  Office: [phone]  
Pulmonary Medicine     Alert. No cough, chest pain or shortness of breath at rest    Vital Signs Last 24 Hrs  T(C): 36.6 (01 Dec 2022 04:53), Max: 36.8 (30 Nov 2022 11:06)  T(F): 97.9 (01 Dec 2022 04:53), Max: 98.3 (30 Nov 2022 11:06)  HR: 60 (01 Dec 2022 04:53) (51 - 67)  BP: 116/69 (01 Dec 2022 04:53) (102/67 - 116/69)  BP(mean): --  RR: 20 (01 Dec 2022 04:53) (18 - 20)  SpO2: 100% (01 Dec 2022 04:53) (93% - 100%)  Parameters below as of 01 Dec 2022 04:53  Patient On (Oxygen Delivery Method): nasal cannula  O2 Flow (L/min): 2    Physical examination   Alert and oriented X three   No evident distress at rest   - on nasal canula   Heart sounds were regular   No wheezing appreciated   The abdomen was soft and not tender   Mild dependent edema noted   No cyanosis   Skin was warm                           10.0   3.93  )-----------( 80       ( 01 Dec 2022 06:20 )             35.1     11-30    144  |  99  |  26<H>  ----------------------------<  75  4.8   |  38<H>  |  0.87    Ca    9.0      30 Nov 2022 05:46    MEDICATIONS  (STANDING):  apixaban 2.5 milliGRAM(s) Oral every 12 hours  carvedilol 6.25 milliGRAM(s) Oral every 12 hours  chlorhexidine 2% Cloths 1 Application(s) Topical <User Schedule>  furosemide    Tablet 40 milliGRAM(s) Oral daily  sacubitril 24 mG/valsartan 26 mG 1 Tablet(s) Oral two times a day  spironolactone 25 milliGRAM(s) Oral daily        
DATE OF SERVICE: 11-30-22 @ 14:30    Patient is a 82y old  Female who presents with a chief complaint of Deconditioning (30 Nov 2022 11:28)      INTERVAL HISTORY: Feels ok.     REVIEW OF SYSTEMS:  CONSTITUTIONAL: No weakness  EYES/ENT: No visual changes;  No throat pain   NECK: No pain or stiffness  RESPIRATORY: No cough, wheezing; No shortness of breath  CARDIOVASCULAR: No chest pain or palpitations  GASTROINTESTINAL: No abdominal  pain. No nausea, vomiting, or hematemesis  GENITOURINARY: No dysuria, frequency or hematuria  NEUROLOGICAL: No stroke like symptoms  SKIN: No rashes    TELEMETRY Personally reviewed: AF/V-Paced 50-70  	  MEDICATIONS:  carvedilol 6.25 milliGRAM(s) Oral every 12 hours  furosemide    Tablet 40 milliGRAM(s) Oral daily  sacubitril 24 mG/valsartan 26 mG 1 Tablet(s) Oral two times a day  spironolactone 25 milliGRAM(s) Oral daily        PHYSICAL EXAM:  T(C): 36.8 (11-30-22 @ 11:06), Max: 36.8 (11-30-22 @ 11:06)  HR: 67 (11-30-22 @ 11:06) (49 - 75)  BP: 102/67 (11-30-22 @ 11:06) (102/67 - 135/62)  RR: 18 (11-30-22 @ 11:06) (18 - 18)  SpO2: 93% (11-30-22 @ 11:06) (93% - 100%)  Wt(kg): --  I&O's Summary    30 Nov 2022 07:01  -  30 Nov 2022 14:30  --------------------------------------------------------  IN: 600 mL / OUT: 0 mL / NET: 600 mL          Appearance: In no distress	  HEENT:    PERRL, EOMI	  Cardiovascular:  S1 S2, No JVD  Respiratory: Lungs clear to auscultation	  Gastrointestinal:  Soft, Non-tender, + BS	  Vascularature:  No edema of LE  Psychiatric: Appropriate affect   Neuro: no acute focal deficits                               10.2   4.37  )-----------( 91       ( 30 Nov 2022 05:46 )             35.7     11-30    144  |  99  |  26<H>  ----------------------------<  75  4.8   |  38<H>  |  0.87    Ca    9.0      30 Nov 2022 05:46  Phos  3.6     11-29  Mg     2.5     11-29    TPro  7.8  /  Alb  3.9  /  TBili  0.4  /  DBili  x   /  AST  33  /  ALT  16  /  AlkPhos  61  11-28        Labs personally reviewed    < from: TTE with Doppler (w/Cont) (11.30.22 @ 01:05) >  EF (Modified Gomez Rule): 40 %  ------------------------------------------------------------------------  Observations:  Mitral Valve: Thickened and tethered mitral valve leaflets  with normal opening. Moderate mitral regurgitation.  Aortic Valve/Aorta: Normal trileaflet aortic valve. No  aortic valve regurgitation seen.  Aortic Root: 2.7 cm.  Left Atrium: Moderately dilated left atrium.  LA volume  index = 42 cc/m2.  Left Ventricle: Endocardial visualization enhanced with  intravenous injection of Ultrasonic Enhancing Agent  (Definity). Moderate global left ventricular systolic  dysfunction with akinesis of the basalinferior wall, and  the mid inferior walls. No left ventricular thrombus.  Normal left ventricular internal dimensions and wall  thicknesses. Severe reversible diastolic dysfunction (Stage  III).  Right Heart: Mild right atrial enlargement. Right  ventricular enlargement with decreased right ventricular  systolic function. A device wire is noted in the right  heart. Tethered tricuspid valve. Moderate tricuspid  regurgitation. Normal pulmonic valve. Minimal pulmonic  regurgitation.  Pericardium/Pleura: Normal pericardium with no pericardial  effusion.  Hemodynamic: Estimated right ventricular systolic pressure  equals 79 mm Hg, assuming right atrial pressure equals > 15  mm Hg, consistent with severe pulmonary hypertension.  ------------------------------------------------------------------------  Conclusions:  1. Thickened and tethered mitral valve leaflets with normal  opening. Moderate mitral regurgitation.  2. Normal trileaflet aortic valve. No aortic valve  regurgitation seen.  3. Endocardial visualization enhanced with intravenous  injection of Ultrasonic Enhancing Agent (Definity).  Moderate global left ventricular systolic dysfunction with  akinesis of the basal inferior wall, and the mid inferior  walls. No left ventricular thrombus.  4. Severe reversible diastolic dysfunction (Stage III).  5. Right ventricular enlargement with decreased right  ventricular systolic function. A device wire is noted in  the right heart.  6. Tethered tricuspid valve. Moderate tricuspid  regurgitation.  7. Estimated pulmonary artery systolic pressure equals 79  mm Hg, assuming right atrial pressure equals > 15 mm Hg,  consistent with severe pulmonary pressures.  *** Compared with echocardiogram of 7/14/2021, no  significant changes noted.    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 States about a month ago she suffered an injury to her LLE which is non-healing but overall improved. Since that initial injury, she states she wasn't as mobile and active as she was prior. As the wound improved, she has more recently attempted to get up and return to her baseline activity level but has felt more tired and fatigued doing such activities a/w decreased appetite as well. She also states when she ambulates her O2 sat would occasionally drop to mid-80s and improve to mid-90s on rest. She otherwise denies any F/C, HA, CP, abd pain, diarrhea or urinary symptoms. Denies any wheezing, cough, need to increased O2 at home d/t SOB or increase in LE edema.       Problem/Plan - 1:  ·  Problem: Acute systolic HF  Plan: - Most recent EF 40-45% in 2021 significantly improved s/p AICD and medical mgmt  - TTE from 2021 show EF of 40-50%, severe pulm HTN  - c/w Entresto, Coreg, Aldactone   - s/p 1 dose of IV lasix with significant improvement. Now with no LE edema and lungs CTA.   - Daily weights, strict I&Os  - TTE unchanged from previous with EF 40%, mod MR, mod TR (was severe in 2021 likely d/t volume overload at that time), severe diastolic dysfunction, severe pulm HTN  - ICD interrogated - device functioning properly    Problem/Plan - 2:  ·  Problem: Atrial fibrillation.  Plan: - hx A.fib  - AURELIO-VASC 5  - on eliquis 2.5 BID    Problem/Plan - 3:  ·  Problem: HTN   -c/w home meds   -BP controlled    Problem/Plan - 4:  ·  Problem: CAD, Hx of MI, PCI  - Denies CP or SOB  - Trop 25  - c/w Coreg  - TTE unchanged from previous with EF 40%, mod MR, mod TR (was severe in 2021 likely d/t volume overload at that time), severe diastolic dysfunction, severe pulm HTN    Problem/Plan - 5  ·  Problem: Deconditioning  - Likely d/t decreased physical activity d/t recent wound  - Extensive discussion about self-limiting activity and importance of appropriate sleep structure  - Rec Home PT/OT    Updated patient and daughter about test results and Plan of care. In agreement.     Jessica Joseph, MOHSEN-NP   Gamaliel Russell DO [facility]  Cardiovascular Medicine  [address], [address]  Available through call or text on [employer]  Office: [phone]

## 2022-12-01 NOTE — DISCHARGE NOTE PROVIDER - HOSPITAL COURSE
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 Pt reports feeling more tired and fatigued doing such activities a/w decreased appetite as well. She also states when she ambulates her O2 sat would occasionally drop to mid-80s and improve to mid-90s on rest and has needed to increased O2 at home d/t SOB or increase in LE edema.        Problem/Plan - 1:  ·  Problem: Acute on Chronic systolic HF  Plan: - Most recent EF 40-45% in 2021 significantly improved s/p AICD and medical mgmt  - TTE from 2021 show EF of 40-50%, severe pulm HTN  - Continue Entresto, Coreg, Aldactone   - s/p 1 dose of IV lasix with significant improvement.   -  Now with no LE edema and lungs CTA.   - TTE unchanged from previous with EF 40%, mod MR, mod TR (was severe in 2021 likely d/t volume overload at that time), severe diastolic dysfunction, severe pulm HTN  - ICD interrogated - device functioning properly     Problem/Plan - 2:  ·  Problem: Chronic Atrial fibrillation.   - AURELIO-VASC 5  - on eliquis 2.5 BID     Problem/Plan - 3:  ·  Problem: HTN   - Continue home HTN meds  - BP controlled     Problem/Plan - 4:  ·  Problem: CAD, Hx of MI, PCI  - Denies CP or SOB  - Trop 25, Coreg     Problem/Plan - 5  ·  Problem: Deconditioning  - Likely d/t decreased physical activity d/t recent wound  - Extensive discussion about self-limiting activity and importance of appropriate sleep structure  - Rec Home PT/OT     Problem/Plan - 6  ·  Problem: Thrombocytopenia   - Plt downtrending  - Appear recent baseline ~ 100  - repeat Plaltelets stable - 80 - > 84    Medically cleared by Dr. Russell to discharge pt  plan home with OP hematology follow up. 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 Pt reports feeling more tired and fatigued doing such activities a/w decreased appetite as well. She also states when she ambulates her O2 sat would occasionally drop to mid-80s and improve to mid-90s on rest and has needed to increased O2 at home d/t SOB or increase in LE edema.        Problem/Plan - 1:  ·  Problem: Acute on Chronic systolic HF  Plan: - Most recent EF 40-45% in 2021 significantly improved s/p AICD and medical mgmt  - TTE from 2021 show EF of 40-50%, severe pulm HTN  - Continue Entresto, Coreg, Aldactone   - s/p 1 dose of IV lasix with significant improvement.   -  Now with no LE edema and lungs CTA.   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   - AURELIO-VASC 5  - on eliquis 2.5 BID     Problem/Plan - 4:  ·  Problem: HTN   - Continue home HTN meds  - BP controlled     Problem/Plan - 5:  ·  Problem: CAD, Hx of MI, PCI  - Denies CP or SOB  - Trop 25, Coreg     Problem/Plan - 6  ·  Problem: Deconditioning  - Likely d/t decreased physical activity d/t recent wound  - Extensive discussion about self-limiting activity and importance of appropriate sleep structure  - Rec Home PT/OT     Problem/Plan - 7  ·  Problem: Thrombocytopenia   - Plt downtrending  - Appear recent baseline ~ 100  - repeat Plaltelets stable - 80 - > 84    Medically cleared by Dr. Russell to discharge pt  plan home with outpt hematology follow up.

## 2022-12-01 NOTE — DISCHARGE NOTE NURSING/CASE MANAGEMENT/SOCIAL WORK - NSDCPEFALRISK_GEN_ALL_CORE
For information on Fall & Injury Prevention, visit: [email or web address] OR  [email or web address] OR  https://www.cdc.gov/steadi/patient.html

## 2022-12-01 NOTE — DISCHARGE NOTE PROVIDER - PROVIDER TOKENS
PROVIDER:[TOKEN:[[id number]],FOLLOWUP:[1-3 days]],FREE:[LAST:[Pt's Hematologist in [facility]],PHONE:[(   )    -],FAX:[(   )    -]]

## 2022-12-01 NOTE — DISCHARGE NOTE PROVIDER - NSDCMRMEDTOKEN_GEN_ALL_CORE_FT
carvedilol 6.25 mg oral tablet: 1 tab(s) orally 2 times a day  Eliquis 2.5 mg oral tablet: 1 tab(s) orally 2 times a day  Entresto 24 mg-26 mg oral tablet: 1 tab(s) orally 2 times a day  furosemide 40 mg oral tablet: 1 tab(s) orally once a day  spironolactone 25 mg oral tablet: 1 tab(s) orally once a day

## 2022-12-01 NOTE — DISCHARGE NOTE PROVIDER - CARE PROVIDER_API CALL
NALLELY CHRISTY J  Cardiovascular Diseases  [address] [place] [address]  Phone: [phone]  Fax: [phone]  Follow Up Time: 1-3 days    Pt's Hematologist in [facility],   Phone: (   )    -  Fax: (   )    -  Follow Up Time:

## 2022-12-01 NOTE — DISCHARGE NOTE NURSING/CASE MANAGEMENT/SOCIAL WORK - PATIENT PORTAL LINK FT
You can access the FollowMyHealth Patient Portal offered by [facility] by registering at the following website: [email or web address]. By joining [employer]’s FollowMyHealth portal, you will also be able to view your health information using other applications (apps) compatible with our system.

## 2022-12-01 NOTE — PROGRESS NOTE ADULT - ASSESSMENT
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 Vidal, as out patient who knows her for many years    Recommend :   Continue treatment for CHF  Maintain on supplemental oxygen   Discussed nocturnal NIV with her and she said she is unable to use it  Rehab assessment    Obi Arciniega MD, FCCP  [facility] Arciniega[facility]  Pulmonary Medicine

## 2022-12-02 NOTE — CHART NOTE - NSCHARTNOTESELECT_GEN_ALL_CORE
Stop Lasix  Start Torsemide 40 mg twice daily  Follow up in 2 weeks with labs    1. Weigh yourself every morning. You should weigh yourself right after you wake up and use the bathroom. Keep a log of these weights and bring them to clinic.    2. Call the clinic with weight gain of 3 pounds in 24 hours or 5 pounds in one week. This could be a sign of fluid build up, and we want to catch this early so we can prevent a hospital stay.    3. Call the clinic with worsening signs of heart failure: chest pain, dizziness, lightheadedness, fainting, increased shortness of breath, increased swelling in your legs/abdomen, poor appetite, feeling full after eating very little, being unable to lie flat in bed due to shortness of breath and having to prop your head up on pillows to sleep, having to sleep in the recliner due to shortness of breath, palpitations/fluttering sensation in the chest, and shocks from your defibrillator.    4. Notify the clinic of any hospitalizations or emergency room visits.    5. Follow a 2 liter fluid restriction (64 fluid oz) per 24 hours. This includes anything that is liquid at room temperature. Measuring your fluid is much better than \"eye-balling\" it. We want to prevent fluid build up with your heart condition.    6. Follow a 2,000 mg sodium (salt) restriction per 24 hours. The best way to stay away from salt is avoid processed foods and restaurants. Get in the habit of reading food labels. Salt causes you to retain fluid and we want to prevent fluid build up with your heart condition.    7. Avoid NSAIDS including but not limited to Ibuprofen, Aleve, and Advil. You should not use these pain relievers with your heart condition.    8. Stay away from tobacco products, alcohol and other street drugs.    9. Take your medications as directed and be mindful of when you are in need of refills.    10. Our clinic phone number is [phone].      Event Note

## 2022-12-02 NOTE — CHART NOTE - NSCHARTNOTEFT_GEN_A_CORE
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

## 2022-12-16 ENCOUNTER — TRANSCRIPTION ENCOUNTER (OUTPATIENT)
Age: 82
End: 2022-12-16

## 2023-01-01 NOTE — PROGRESS NOTE ADULT - PROBLEM SELECTOR PLAN 1
Subjective   Patient ID: Lori is a 6 week old female who is accompanied by:mother     Well Child Assessment:  History was provided by the mother. Lori lives with her mother, father and brother (maternal aunt).   Nutrition  Types of milk consumed include breast feeding. Breast Feeding - Frequency of breast feedings: once a day. The breast milk is pumped. Formula - Types of formula consumed include cow's milk based. Formula consumed per feeding (oz): 4 - 6 oz. Feedings occur every 1-3 hours. Feeding problems do not include burping poorly, spitting up or vomiting.   Elimination  Urination occurs 4-6 times per 24 hours. Bowel movements occur 1-3 times per 24 hours. Stools have a loose consistency. Elimination problems do not include colic, constipation, diarrhea, gas or urinary symptoms.   Sleep  The patient sleeps in her bassinet or parents' bed. Sleep positions include supine.   Safety  Home is child-proofed? no. There is no smoking in the home. Home has working smoke alarms? yes. Home has working carbon monoxide alarms? yes. There is an appropriate car seat in use.   Screening  Immunizations are up-to-date.   Social  Childcare is provided at child's home. The childcare provider is a parent.       Patient is a 6 week old Black female here for a check-up.  No complaints presently.      Review of Systems   Constitutional: Negative for activity change, appetite change and fever.   HENT: Negative for congestion, ear discharge, rhinorrhea and trouble swallowing.    Eyes: Negative for discharge and redness.   Respiratory: Negative for cough and choking.    Cardiovascular: Negative for cyanosis.   Gastrointestinal: Negative for abdominal distention, blood in stool, constipation, diarrhea and vomiting.   Genitourinary: Negative for decreased urine volume.   Musculoskeletal: Negative for joint swelling.   Skin: Negative for rash.   Neurological: Negative for facial asymmetry.       Allergies  ALLERGIES:  Patient has no  known allergies.    Past Surgical History  No past surgical history on file.    Past Medical History  No past medical history on file.    Family History  Family History   Problem Relation Age of Onset   • Systemic Lupus Erythematosus Mother    • Asthma Father    • Asthma Brother    • Hypertension Maternal Grandfather    • Kidney disease Maternal Grandfather    • Asthma Paternal Aunt    • Asthma Paternal Great-Grandmother    • Diabetes Other        Medications  Current Outpatient Medications   Medication Sig   • cholecalciferol (VITAMIN D) 10 mcg (400 units)/mL oral liquid Take 1 mL by mouth daily.   • Cholecalciferol 10 mcg (400 units)/mL oral liquid Take 1 mL by mouth daily.     No current facility-administered medications for this visit.       Vitals  Visit Vitals  Pulse 124   Temp 98 °F (36.7 °C) (Tympanic)   Resp 32   Ht 22.05\" (56 cm)   Wt 3.905 kg (8 lb 9.7 oz)   HC 37.5 cm (14.75\")   SpO2 100%   BMI 12.45 kg/m²       Growth parameters are noted and are appropriate for age.  Physical Exam  Vitals and nursing note reviewed.   Constitutional:       General: She is active. She is not in acute distress.     Appearance: Normal appearance. She is well-developed. She is not toxic-appearing.   HENT:      Head: Normocephalic and atraumatic. Anterior fontanelle is flat.      Right Ear: Tympanic membrane, ear canal and external ear normal.      Left Ear: Tympanic membrane, ear canal and external ear normal.      Nose: Nose normal.      Mouth/Throat:      Mouth: Mucous membranes are moist.      Pharynx: Oropharynx is clear. No oropharyngeal exudate or posterior oropharyngeal erythema.      Neck: Normal range of motion and neck supple. No rigidity.   Eyes:      General: Red reflex is present bilaterally.         Right eye: No discharge.         Left eye: No discharge.      Extraocular Movements: Extraocular movements intact.      Conjunctiva/sclera: Conjunctivae normal.      Pupils: Pupils are equal, round, and reactive to  light.   Cardiovascular:      Rate and Rhythm: Normal rate and regular rhythm.      Pulses: Normal pulses.      Heart sounds: Normal heart sounds. No murmur heard.  Pulmonary:      Effort: Pulmonary effort is normal. No respiratory distress, nasal flaring or retractions.      Breath sounds: Normal breath sounds. No stridor or decreased air movement. No wheezing, rhonchi or rales.   Abdominal:      General: Bowel sounds are normal. There is no distension.      Palpations: Abdomen is soft. There is no mass.      Tenderness: There is no abdominal tenderness.      Hernia: No hernia is present.   Genitourinary:     General: Normal vulva.      Labia: No labial fusion.       Rectum: Normal.   Musculoskeletal:         General: No swelling, tenderness, deformity or signs of injury. Normal range of motion.      Right hip: Negative right Ortolani and negative right Mckeon.      Left hip: Negative left Ortolani and negative left Mckeon.   Lymphadenopathy:      Cervical: No cervical adenopathy.   Skin:     General: Skin is warm.      Capillary Refill: Capillary refill takes less than 2 seconds.      Turgor: Normal.      Findings: No rash. There is no diaper rash.   Neurological:      General: No focal deficit present.      Mental Status: She is alert.      Sensory: No sensory deficit.      Motor: No abnormal muscle tone.      Primitive Reflexes: Suck normal. Symmetric Ivett.         Assessment   Problem List Items Addressed This Visit     Encounter for routine child health examination without abnormal findings - Primary     Re contact clinic as needed for acute illness.  RTC at 2 months of age for a check-up and an immunization update.            Screening tests:     Developmental: Normal for age    Screening tests:   State  metabolic screen: Not available  Hearing screen (OAE, ABR): Pass  Congenital heart screen: Pass    Immunizations given today: No    Follow-up at next well child visit, or sooner as needed.       Multifactorial due to volume overload with acute worsening in sodium after metolazone started on 4/7. Serum sodium improving. Continue with 0.8-1L FR. Monitor serum sodium.

## 2023-01-05 ENCOUNTER — NON-APPOINTMENT (OUTPATIENT)
Age: 83
End: 2023-01-05

## 2023-01-17 ENCOUNTER — APPOINTMENT (OUTPATIENT)
Dept: PULMONOLOGY | Facility: CLINIC | Age: 83
End: 2023-01-17
Payer: MEDICARE

## 2023-01-17 DIAGNOSIS — Z86.16 PERSONAL HISTORY OF COVID-19: ICD-10-CM

## 2023-01-17 PROCEDURE — 99214 OFFICE O/P EST MOD 30 MIN: CPT | Mod: 95

## 2023-01-17 NOTE — HISTORY OF PRESENT ILLNESS
[Home] : at home, [unfilled] , at the time of the visit. [Medical Office: ([facility])___] : at the medical office located in  [Family Member] : family member [Verbal consent obtained from patient] : the patient, [unfilled] [Former] : former [Never] : never [TextBox_4] : Has been diagnosed with COVID-19 infection 5 days ago.  She reported chills, cough.  Her breathing was not much worse.\par \par However, she has reported worsening respiratory status prior to the episode.  She has been on O2 2-3 3pm.  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

## 2023-01-17 NOTE — DISCUSSION/SUMMARY
[FreeTextEntry1] : RECOMMEND\par \par guaifenesin for cough\par \par acetaminophen only as needed for pain or fever\par \par monitor O2 saturation\par \par continue diuretic, limit fluid\par \par no need for antiviral after 5 days of symptoms, with clinical improvement and minimal symptoms\par \par Close follow up and repeat CXR\par \par Dominic Mccray MD

## 2023-01-24 ENCOUNTER — NON-APPOINTMENT (OUTPATIENT)
Age: 83
End: 2023-01-24

## 2023-04-17 ENCOUNTER — NON-APPOINTMENT (OUTPATIENT)
Age: 83
End: 2023-04-17

## 2023-04-20 ENCOUNTER — APPOINTMENT (OUTPATIENT)
Dept: PULMONOLOGY | Facility: CLINIC | Age: 83
End: 2023-04-20
Payer: MEDICARE

## 2023-04-20 VITALS
DIASTOLIC BLOOD PRESSURE: 62 MMHG | BODY MASS INDEX: 36.15 KG/M2 | TEMPERATURE: 97.1 F | OXYGEN SATURATION: 96 % | HEART RATE: 57 BPM | WEIGHT: 179 LBS | SYSTOLIC BLOOD PRESSURE: 120 MMHG

## 2023-04-20 DIAGNOSIS — I50.9 HEART FAILURE, UNSPECIFIED: ICD-10-CM

## 2023-04-20 PROCEDURE — 99214 OFFICE O/P EST MOD 30 MIN: CPT

## 2023-04-21 NOTE — PROCEDURE
[FreeTextEntry1] : CT Chest 6/23/17: Small bilateral pleural effusions. There was cardiomegaly. No evidence of any pulmonary nodules noted. No adenopathy. ICD in place. \par \par CT Chest 11/11/21: Mild bilateral septal thickening. Peripheral reticulations. No bronchiectasis or honeycombing. \par \par ABG 6/14/18: 7.47/42/185 on O2 at 3 LPM. \par \par ABG 7/13/21: 7.15/ >104/172. 7.34/76/67. \par \par ABG 7/14/21: 7.38/69/77.\par \par ABG 7/15/21: 7.37/73/74.\par \par PFT 2/10/17: There was mild obstruction. There was mild restriction. Diffusion was moderately reduced. No significant change noted after inhalation of bronchodilator.\par

## 2023-04-21 NOTE — DISCUSSION/SUMMARY
LOV 10/16/17 [FreeTextEntry1] : She is an 82 year-old woman with a history of hypertension, hyperlipidemia, cardiomyopathy, S/P ICD, CHF, pneumonitis (acute presentation suspected to be due to amiodarone toxicity, in the remote past), coronary artery disease, S/P stenting and abdominal aortic aneurysm and OHS. She was hospitalized for CHF in July 2021. Placed on bilevel for hypercapnia. Also on supplemental oxygen. \par \par Impression: \par Generalized weakness\par History of mild ILD\par OHS in NIV\par - hypercapnia\par - hypoxia, desaturates with exertion\par PSG with bilevel titration has been offered in the past however she has decline\par - has to care for her daughter \par \par Plan: \par Maintain on oxygen 24 hours per day\par - NIV at night\par CT of the Chest requested\par Results from Dr. Vidal\par Claritin prn\par To follow up with her PCP, Dr. Guillermo\par Discussed with her son in law who was present\par - for blood work and routine medical assessment\par

## 2023-04-21 NOTE — REVIEW OF SYSTEMS
[Fatigue] : fatigue [Dyspnea] : dyspnea [Edema] : ~T edema was present [Ear Disturbance] : ear disturbance [Fever] : no fever [Chills] : no chills [Poor Appetite] : normal appetite  [Postnasal Drip] : no postnasal drip [Cough] : no cough [Wheezing] : no wheezing [Chest Discomfort] : no chest discomfort [Nasal Discharge] : no nasal discharge [Heartburn] : no heartburn [Back Pain] : ~T no back pain [Rash] : no [unfilled] rash [Anemia] : no anemia [Headache] : no headache [Depression] : no depression [Diabetes] : no diabetes mellitus [Thyroid Problem] : no thyroid problem [DVT] : no DVT

## 2023-04-21 NOTE — HISTORY OF PRESENT ILLNESS
[Never] : never [Obesity Hypoventilation Syndrome] : obesity hypoventilation syndrome [TextBox_4] : Has been feeling very tired. Walks with a walker. Feels fatigued after doing so. \par \par Had ICD changed by Dr. Au in 2019 at [facility].  She feels that she had an adverse reaction to anesthesia. \par \par Dr. Janelle Guillermo will be her new PCP. \par \par Had COVID in December 2022 and two times before that. \par \par Sees Dr. Vidal for Cardiology. \par \par \par \par \par  [Awakes Unrefreshed] : does not awaken unrefreshed [Daytime Somnolence] : denies daytime somnolence [Difficulty Initiating Sleep] : does not have difficulty initiating sleep

## 2023-04-21 NOTE — PHYSICAL EXAM
[General Appearance - In No Acute Distress] : no acute distress [Neck Cervical Mass (___cm)] : no neck mass was observed [Heart Sounds] : normal S1 and S2 [Auscultation Breath Sounds / Voice Sounds] : lungs were clear to auscultation bilaterally [Abdomen Tenderness] : non-tender [Cyanosis, Localized] : no localized cyanosis [1+ Pitting] : 1+  pitting [Skin Turgor] : normal skin turgor [No Focal Deficits] : no focal deficits [Oriented To Time, Place, And Person] : oriented to person, place, and time [Impaired Insight] : insight and judgment were intact [Nail Clubbing] : no clubbing of the fingernails [Well Groomed] : well groomed [Heart Rate And Rhythm] : heart rate and rhythm were normal [] : no hepato-splenomegaly

## 2023-05-05 ENCOUNTER — NON-APPOINTMENT (OUTPATIENT)
Age: 83
End: 2023-05-05

## 2023-06-01 NOTE — ED ADULT NURSE NOTE - NSFALLRSKASSISTTYPE_ED_ALL_ED
From: Jaquelin Hou  To: Anastasia Burgos  Sent: 6/1/2023 10:17 AM CDT  Subject: Covid immunization    Hi Dr. Burgos,  I hope you are doing well. I'm going to [facility] June 17th. I've had 4 Moderna immunizations for Covid M. The last one was 10/7/22. I had covid in January. Am I up to date with these? And do I need to bring my vaccination record card with me when I travel?  Thank you!  Jaquelin Hou   Standing/Walking/Toileting

## 2023-06-08 ENCOUNTER — NON-APPOINTMENT (OUTPATIENT)
Age: 83
End: 2023-06-08

## 2023-06-23 ENCOUNTER — NON-APPOINTMENT (OUTPATIENT)
Age: 83
End: 2023-06-23

## 2023-06-23 DIAGNOSIS — J96.12 CHRONIC RESPIRATORY FAILURE WITH HYPERCAPNIA: ICD-10-CM

## 2023-09-01 ENCOUNTER — APPOINTMENT (OUTPATIENT)
Dept: PULMONOLOGY | Facility: CLINIC | Age: 83
End: 2023-09-01

## 2023-10-12 ENCOUNTER — APPOINTMENT (OUTPATIENT)
Dept: PULMONOLOGY | Facility: CLINIC | Age: 83
End: 2023-10-12

## 2023-10-12 ENCOUNTER — NON-APPOINTMENT (OUTPATIENT)
Age: 83
End: 2023-10-12

## 2023-12-26 ENCOUNTER — RX RENEWAL (OUTPATIENT)
Age: 83
End: 2023-12-26

## 2024-01-24 ENCOUNTER — APPOINTMENT (OUTPATIENT)
Dept: PULMONOLOGY | Facility: CLINIC | Age: 84
End: 2024-01-24
Payer: MEDICARE

## 2024-01-24 VITALS
HEART RATE: 67 BPM | WEIGHT: 167 LBS | OXYGEN SATURATION: 97 % | TEMPERATURE: 97.9 F | BODY MASS INDEX: 33.73 KG/M2 | DIASTOLIC BLOOD PRESSURE: 70 MMHG | SYSTOLIC BLOOD PRESSURE: 128 MMHG

## 2024-01-24 DIAGNOSIS — R06.02 SHORTNESS OF BREATH: ICD-10-CM

## 2024-01-24 DIAGNOSIS — Z99.81 DEPENDENCE ON SUPPLEMENTAL OXYGEN: ICD-10-CM

## 2024-01-24 DIAGNOSIS — J96.92 RESPIRATORY FAILURE, UNSPECIFIED WITH HYPERCAPNIA: ICD-10-CM

## 2024-01-24 PROCEDURE — 99214 OFFICE O/P EST MOD 30 MIN: CPT

## 2024-01-24 NOTE — PHYSICAL EXAM
[Well Groomed] : well groomed [General Appearance - In No Acute Distress] : no acute distress [Neck Cervical Mass (___cm)] : no neck mass was observed [Heart Rate And Rhythm] : heart rate and rhythm were normal [Heart Sounds] : normal S1 and S2 [Auscultation Breath Sounds / Voice Sounds] : lungs were clear to auscultation bilaterally [Abdomen Tenderness] : non-tender [Nail Clubbing] : no clubbing of the fingernails [Cyanosis, Localized] : no localized cyanosis [1+ Pitting] : 1+  pitting [Skin Turgor] : normal skin turgor [No Focal Deficits] : no focal deficits [Oriented To Time, Place, And Person] : oriented to person, place, and time [Impaired Insight] : insight and judgment were intact [Neck Appearance] : the appearance of the neck was normal [] : no respiratory distress

## 2024-01-24 NOTE — REVIEW OF SYSTEMS
[Ear Disturbance] : ear disturbance [Edema] : ~T edema was present [Fever] : no fever [Fatigue] : no fatigue [Postnasal Drip] : no postnasal drip [Cough] : no cough [Dyspnea] : no dyspnea [Chest Tightness] : no chest tightness [Wheezing] : no wheezing [Chest Discomfort] : no chest discomfort [Nasal Discharge] : no nasal discharge [Back Pain] : ~T no back pain [Heartburn] : no heartburn [Rash] : no [unfilled] rash [Anemia] : no anemia [Headache] : no headache [Depression] : no depression [Diabetes] : no diabetes mellitus [Thyroid Problem] : no thyroid problem [DVT] : no DVT

## 2024-01-24 NOTE — DISCUSSION/SUMMARY
[FreeTextEntry1] : She is an 83 year-old woman with a history of hypertension, hyperlipidemia, cardiomyopathy, S/P ICD, CHF, pneumonitis (acute presentation suspected to be due to amiodarone toxicity, in the remote past), coronary artery disease, S/P stenting and abdominal aortic aneurysm and OHS. She was hospitalized for CHF in July 2021. Placed on bilevel for hypercapnia. Also on supplemental oxygen.   Impression:  COPD -Mosaic attenuation and emphysematous changes noted on CT -Clinically improved Probable REYNALDO/OHS -Have advised in lab polysomnogram, she declined it  Recommend Continue with oxygen 24 hours a day Polysomnogram with CPAP, bilevel, titration discussed Continue with Trelegy Continue weight loss and exercise Follow-up with cardiology and her PCP  Discussed with her daughter Ilana who was present

## 2024-01-24 NOTE — HISTORY OF PRESENT ILLNESS
[Never] : never [Obesity Hypoventilation Syndrome] : obesity hypoventilation syndrome [TextBox_4] : She came for follow-up today.  She is using oxygen every day and night.  She is not on any nocturnal ventilation.  She stated that she is feeling much better, less short of breath, after using Trelegy.  She denied any coughing or wheezing.  Had COVID in December 2022 and two times before that.  [Awakes Unrefreshed] : does not awaken unrefreshed [Difficulty Initiating Sleep] : does not have difficulty initiating sleep

## 2024-04-25 ENCOUNTER — APPOINTMENT (OUTPATIENT)
Dept: PULMONOLOGY | Facility: CLINIC | Age: 84
End: 2024-04-25
Payer: MEDICARE

## 2024-04-25 VITALS
BODY MASS INDEX: 34.57 KG/M2 | HEART RATE: 59 BPM | RESPIRATION RATE: 12 BRPM | DIASTOLIC BLOOD PRESSURE: 64 MMHG | TEMPERATURE: 98.3 F | SYSTOLIC BLOOD PRESSURE: 145 MMHG | WEIGHT: 171.5 LBS | HEIGHT: 59 IN | OXYGEN SATURATION: 95 %

## 2024-04-25 DIAGNOSIS — I42.0 ISCHEMIC CARDIOMYOPATHY: ICD-10-CM

## 2024-04-25 DIAGNOSIS — I25.5 ISCHEMIC CARDIOMYOPATHY: ICD-10-CM

## 2024-04-25 DIAGNOSIS — J44.9 CHRONIC OBSTRUCTIVE PULMONARY DISEASE, UNSPECIFIED: ICD-10-CM

## 2024-04-25 DIAGNOSIS — Z99.89 DEPENDENCE ON OTHER ENABLING MACHINES AND DEVICES: ICD-10-CM

## 2024-04-25 DIAGNOSIS — J84.9 INTERSTITIAL PULMONARY DISEASE, UNSPECIFIED: ICD-10-CM

## 2024-04-25 PROCEDURE — 99213 OFFICE O/P EST LOW 20 MIN: CPT

## 2024-04-25 PROCEDURE — 99203 OFFICE O/P NEW LOW 30 MIN: CPT

## 2024-04-25 RX ORDER — LEVALBUTEROL TARTRATE 45 UG/1
45 AEROSOL, METERED ORAL TWICE DAILY
Qty: 1 | Refills: 4 | Status: ACTIVE | COMMUNITY
Start: 2024-04-25 | End: 1900-01-01

## 2024-04-25 RX ORDER — UMECLIDINIUM BROMIDE AND VILANTEROL TRIFENATATE 62.5; 25 UG/1; UG/1
62.5-25 POWDER RESPIRATORY (INHALATION)
Qty: 1 | Refills: 5 | Status: DISCONTINUED | COMMUNITY
Start: 2024-03-15 | End: 2024-04-25

## 2024-04-25 NOTE — HISTORY OF PRESENT ILLNESS
[Former] : former [< 20 pack-years] : < 20 pack-years [Never] : never [TextBox_4] : 83 year old patient presents for evaluation of cardiomyopathy, aortic aneurysm, obesity hypoventilation, CHF. 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  She had benefitted greatly from using Trelegy but developed thrush.  She is concerned about potential cardiac adverse events given prior  history of arrhythmi and having AICD.  She is O2 depndent  She is followed by Dr Vidal.   CT Chest 11/11/21: Mild bilateral septal thickening. Peripheral reticulations. No bronchiectasis or honeycombing.  CT Chest 5/2/23: ICD in position. Coronary artery calcifications were present. Anemia suspected. Bronchial and bronchiolar wall thickening. Subsegmental atelectasis and scarring. Mosaic attenuation, bilaterally. Centrilobular emphysema. Small pulmonary nodules measuring up to 3 mm in size. No pleural effusion. Abdominal aortic stent. See report.       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

## 2024-04-25 NOTE — REASON FOR VISIT
[Consultation] : a consultation [COPD] : COPD [TextBox_44] : cardiomyopathy, aortic aneurysm, obesity hypoventilation, CHF. O2 dependent, oral thrush

## 2024-04-25 NOTE — DISCUSSION/SUMMARY
[FreeTextEntry1] : 83 year old woman with CAD, arrhythmia, AICD, cardiomegaly, COPD, mild fibrosis on CT chest, O2 dependent, hx AAA  She is concerned about taking any inhaled bronchodilator due to concerns about tachycardia and arrhythmia She used Treegy that helped but she developed thrush  She is active  Had CT chest in 2023 that was reviewed  PLAN  after discussion she will be started on Incruse (LAMA)  continue cardiac regimen  For rescue, may use levalbuterol MDI  continue O2  Further recommendations based on clinical effect  Total time spent : 40 minutes Including: Preparation prior to visit - Reviewing prior record, results of tests and Consultation Reports as applicable Conducting an appropriate H & P during today's encounter  reviewing all available CT chest exams.  demonstrating images to pt as appropriate Counseling patient  Note completion  med renewal discussing differential diagnosis    Dominic Mccray MD

## 2024-04-25 NOTE — PHYSICAL EXAM
[No Acute Distress] : no acute distress [Well Nourished] : well nourished [Well Developed] : well developed [Supple] : supple [No JVD] : no jvd [Normal Oropharynx] : normal oropharynx [Normal Rate/Rhythm] : normal rate/rhythm [Normal S1, S2] : normal s1, s2 [No Murmurs] : no murmurs [No Resp Distress] : no resp distress [Benign] : benign [Not Tender] : not tender [No Clubbing] : no clubbing [No Edema] : no edema [No Focal Deficits] : no focal deficits [Oriented x3] : oriented x3 [Normal Affect] : normal affect [TextBox_11] : no thrush [TextBox_44] : kyphosis [TextBox_68] : mild few rhonchi, no wheeze, no crackles

## 2024-05-20 RX ORDER — FLUTICASONE FUROATE, UMECLIDINIUM BROMIDE AND VILANTEROL TRIFENATATE 100; 62.5; 25 UG/1; UG/1; UG/1
100-62.5-25 POWDER RESPIRATORY (INHALATION) DAILY
Qty: 1 | Refills: 5 | Status: DISCONTINUED | COMMUNITY
Start: 2023-10-12 | End: 2024-05-20

## 2024-06-06 RX ORDER — UMECLIDINIUM 62.5 UG/1
62.5 AEROSOL, POWDER ORAL
Qty: 1 | Refills: 2 | Status: ACTIVE | COMMUNITY
Start: 2024-04-25 | End: 1900-01-01

## 2024-07-24 ENCOUNTER — APPOINTMENT (OUTPATIENT)
Dept: PULMONOLOGY | Facility: CLINIC | Age: 84
End: 2024-07-24

## 2024-09-27 ENCOUNTER — OUTPATIENT (OUTPATIENT)
Dept: OUTPATIENT SERVICES | Facility: HOSPITAL | Age: 84
LOS: 1 days | End: 2024-09-27
Payer: MEDICARE

## 2024-09-27 ENCOUNTER — APPOINTMENT (OUTPATIENT)
Dept: WOUND CARE | Facility: HOSPITAL | Age: 84
End: 2024-09-27
Payer: MEDICARE

## 2024-09-27 VITALS
TEMPERATURE: 97.7 F | SYSTOLIC BLOOD PRESSURE: 144 MMHG | HEART RATE: 62 BPM | DIASTOLIC BLOOD PRESSURE: 78 MMHG | OXYGEN SATURATION: 94 %

## 2024-09-27 DIAGNOSIS — Z90.710 ACQUIRED ABSENCE OF BOTH CERVIX AND UTERUS: Chronic | ICD-10-CM

## 2024-09-27 DIAGNOSIS — Z98.89 OTHER SPECIFIED POSTPROCEDURAL STATES: Chronic | ICD-10-CM

## 2024-09-27 DIAGNOSIS — I87.2 VENOUS INSUFFICIENCY (CHRONIC) (PERIPHERAL): ICD-10-CM

## 2024-09-27 PROCEDURE — 11042 DBRDMT SUBQ TIS 1ST 20SQCM/<: CPT

## 2024-10-03 NOTE — HISTORY OF PRESENT ILLNESS
[FreeTextEntry1] : Patient presents to wound center for evaluation ulcerations x 2 medial right ankle +Venous insufficeincy bilaterally 1+ edema both legs DP and PT palpable both feet No signs of cellulitis dramatic improvement in ulcerations x 2 medial right leg eschar noted with one wound healed and superficial ulcer medial right leg

## 2024-10-03 NOTE — PLAN
[FreeTextEntry1] : full thickness debridement of ulceration medial right leg with sterile #10 blade to the level of the subcutaneous tissue right leg no need for oral antibiosis patient to use parish and gauze with gus and ace bandage patient may need to get compression stockings in future no signs of cellulitis recommend patient see vascular doctor for evaluation of venous stasis stress importance of elevating legs and using compression to avoid recurrance of wounds return 3 weeks or prn Ordering home care services for wound care. Physicians Face-to-Face attestation - I certify that this patient is essentially homebound (leaving the home requires a taxing effort) and that after completing a face to face encounter has a need for intermittent skilled nursing in their home for the current diagnosis.  These services will continue to be monitored by myself or another physician.

## 2024-10-04 ENCOUNTER — APPOINTMENT (OUTPATIENT)
Dept: WOUND CARE | Facility: HOSPITAL | Age: 84
End: 2024-10-04
Payer: MEDICARE

## 2024-10-04 DIAGNOSIS — I87.2 VENOUS INSUFFICIENCY (CHRONIC) (PERIPHERAL): ICD-10-CM

## 2024-10-04 DIAGNOSIS — L97.512 NON-PRESSURE CHRONIC ULCER OF OTHER PART OF RIGHT FOOT WITH FAT LAYER EXPOSED: ICD-10-CM

## 2024-10-04 PROCEDURE — 11042 DBRDMT SUBQ TIS 1ST 20SQCM/<: CPT

## 2024-10-04 NOTE — PHYSICAL EXAM
[1+] : left 1+ [Varicose Veins Of Lower Extremities] : bilaterally [] : bilaterally [Ankle Swelling Bilaterally] : severe [Ankle Swelling On The Left] : moderate [Skin Ulcer] : ulcer [Alert] : alert [Oriented to Person] : oriented to person [Oriented to Place] : oriented to place [Oriented to Time] : oriented to time [Calm] : calm [de-identified] : non traumatic [de-identified] : nad [de-identified] : supple [de-identified] : 24 hr use of oxygen [de-identified] : RLHELENE

## 2024-10-04 NOTE — ASSESSMENT
[At Term] : at term [FreeTextEntry1] : 10/4/24 Patient presents for follow up of RLE wound accompanied by HHA. Patient has been applying Cherie to the wound bed. On exam: BLE swelling BLE hemosiderin staining Wound covered in black eschar Periwound intact s/p excisional debridement Washed with soap and water Applied Cherie Gauze/Luís/ACE [Normal Vaginal Route] : by normal vaginal route [None] : No maternal complications [Passed] : passed

## 2024-10-04 NOTE — REVIEW OF SYSTEMS
[Lower Ext Edema] : lower extremity edema [SOB on Exertion] : shortness of breath during exertion [Joint Stiffness] : joint stiffness [Skin Wound] : skin wound [Negative] : Psychiatric [FreeTextEntry5] : PPM [FreeTextEntry6] : 24 hr oxygen use [de-identified] : Right medial ankle

## 2024-10-04 NOTE — PLAN
[FreeTextEntry1] : full thickness debridement of ulceration medial right leg with sterile #10 blade to the level of the subcutaneous tissue right leg no need for oral antibiosis patient to use parish and gauze with luís and ace bandage patient may need to get compression stockings in future no signs of cellulitis recommend patient see vascular doctor for evaluation of venous stasis stress importance of elevating legs and using compression to avoid recurrance of wounds return 3 weeks or prn Ordering home care services for wound care. Physicians Face-to-Face attestation - I certify that this patient is essentially homebound (leaving the home requires a taxing effort) and that after completing a face to face encounter has a need for intermittent skilled nursing in their home for the current diagnosis.  These services will continue to be monitored by myself or another physician.  10/4/24 Plan: WAsh with soap and water Apply Parish to the wound bed only Gauze/Luís/ACE Change every other day Follow up with Dr. Rust

## 2024-10-08 DIAGNOSIS — I87.2 VENOUS INSUFFICIENCY (CHRONIC) (PERIPHERAL): ICD-10-CM

## 2024-10-15 NOTE — H&P ADULT - NSVTERISKASSESS_GEN_ALL_CORE FT
Medical Assessment Completed on: 29-Nov-2022 01:08

## 2024-10-18 ENCOUNTER — OUTPATIENT (OUTPATIENT)
Dept: OUTPATIENT SERVICES | Facility: HOSPITAL | Age: 84
LOS: 1 days | End: 2024-10-18
Payer: MEDICARE

## 2024-10-18 ENCOUNTER — APPOINTMENT (OUTPATIENT)
Dept: WOUND CARE | Facility: HOSPITAL | Age: 84
End: 2024-10-18
Payer: MEDICARE

## 2024-10-18 DIAGNOSIS — L97.512 NON-PRESSURE CHRONIC ULCER OF OTHER PART OF RIGHT FOOT WITH FAT LAYER EXPOSED: ICD-10-CM

## 2024-10-18 DIAGNOSIS — I87.2 VENOUS INSUFFICIENCY (CHRONIC) (PERIPHERAL): ICD-10-CM

## 2024-10-18 DIAGNOSIS — Z98.89 OTHER SPECIFIED POSTPROCEDURAL STATES: Chronic | ICD-10-CM

## 2024-10-18 DIAGNOSIS — Z90.710 ACQUIRED ABSENCE OF BOTH CERVIX AND UTERUS: Chronic | ICD-10-CM

## 2024-10-18 PROCEDURE — 99213 OFFICE O/P EST LOW 20 MIN: CPT

## 2024-10-29 DIAGNOSIS — I87.2 VENOUS INSUFFICIENCY (CHRONIC) (PERIPHERAL): ICD-10-CM

## 2024-11-20 PROCEDURE — G0463: CPT

## 2024-12-12 NOTE — PROCEDURE
[FreeTextEntry1] : CT Chest 6/23/17: Small bilateral pleural effusions. There was cardiomegaly. No evidence of any pulmonary nodules noted. No adenopathy. ICD in place.   CT Chest 11/11/21: Mild bilateral septal thickening. Peripheral reticulations. No bronchiectasis or honeycombing.   CT Chest 5/2/23: ICD in position.  Coronary artery calcifications were present.  Anemia suspected.  Bronchial and bronchiolar wall thickening.  Subsegmental atelectasis and scarring.  Mosaic attenuation, bilaterally.  Centrilobular emphysema.  Small pulmonary nodules measuring up to 3 mm in size.  No pleural effusion.  Abdominal aortic stent.  See report.  ABG 6/14/18: 7.47/42/185 on O2 at 3 LPM.   ABG 7/13/21: 7.15 >104/172. 7.34/76/67.   ABG 7/14/21: 7.38/69/77.  ABG 7/15/21: 7.37/73/74.  ABG 6/14/18: 7.47/42/185 on 3 LPM.  PFT 2/10/17: There was mild obstruction. There was mild restriction. Diffusion was moderately reduced. No significant change noted after inhalation of bronchodilator. 120

## 2025-05-16 ENCOUNTER — RX RENEWAL (OUTPATIENT)
Age: 85
End: 2025-05-16

## 2025-05-21 ENCOUNTER — NON-APPOINTMENT (OUTPATIENT)
Age: 85
End: 2025-05-21

## 2025-05-21 ENCOUNTER — APPOINTMENT (OUTPATIENT)
Dept: PULMONOLOGY | Facility: CLINIC | Age: 85
End: 2025-05-21

## 2025-06-02 ENCOUNTER — APPOINTMENT (OUTPATIENT)
Dept: PULMONOLOGY | Facility: CLINIC | Age: 85
End: 2025-06-02
Payer: MEDICARE

## 2025-06-02 VITALS
DIASTOLIC BLOOD PRESSURE: 68 MMHG | TEMPERATURE: 97.8 F | HEART RATE: 55 BPM | OXYGEN SATURATION: 98 % | WEIGHT: 177 LBS | BODY MASS INDEX: 35.75 KG/M2 | SYSTOLIC BLOOD PRESSURE: 134 MMHG

## 2025-06-02 DIAGNOSIS — J44.9 CHRONIC OBSTRUCTIVE PULMONARY DISEASE, UNSPECIFIED: ICD-10-CM

## 2025-06-02 DIAGNOSIS — Z99.81 DEPENDENCE ON SUPPLEMENTAL OXYGEN: ICD-10-CM

## 2025-06-02 DIAGNOSIS — J96.12 CHRONIC RESPIRATORY FAILURE WITH HYPERCAPNIA: ICD-10-CM

## 2025-06-02 DIAGNOSIS — I50.9 HEART FAILURE, UNSPECIFIED: ICD-10-CM

## 2025-06-02 PROCEDURE — 99214 OFFICE O/P EST MOD 30 MIN: CPT

## 2025-06-02 PROCEDURE — G2211 COMPLEX E/M VISIT ADD ON: CPT

## 2025-06-03 ENCOUNTER — APPOINTMENT (OUTPATIENT)
Dept: PULMONOLOGY | Facility: CLINIC | Age: 85
End: 2025-06-03

## 2025-06-04 ENCOUNTER — NON-APPOINTMENT (OUTPATIENT)
Age: 85
End: 2025-06-04

## 2025-06-09 RX ORDER — BUDESONIDE, GLYCOPYRROLATE, AND FORMOTEROL FUMARATE 160; 9; 4.8 UG/1; UG/1; UG/1
160-9-4.8 AEROSOL, METERED RESPIRATORY (INHALATION) TWICE DAILY
Qty: 1 | Refills: 3 | Status: DISCONTINUED | COMMUNITY
Start: 2025-06-02 | End: 2025-06-09

## 2025-07-19 ENCOUNTER — NON-APPOINTMENT (OUTPATIENT)
Age: 85
End: 2025-07-19

## 2025-08-06 ENCOUNTER — INPATIENT (INPATIENT)
Facility: HOSPITAL | Age: 85
LOS: 7 days | Discharge: NURSING, REHAB OR LONG-TERM CARE | DRG: 189 | End: 2025-08-14
Attending: INTERNAL MEDICINE | Admitting: INTERNAL MEDICINE
Payer: MEDICARE

## 2025-08-06 VITALS
SYSTOLIC BLOOD PRESSURE: 152 MMHG | HEART RATE: 54 BPM | TEMPERATURE: 98 F | OXYGEN SATURATION: 94 % | DIASTOLIC BLOOD PRESSURE: 69 MMHG | RESPIRATION RATE: 40 BRPM

## 2025-08-06 DIAGNOSIS — Z29.9 ENCOUNTER FOR PROPHYLACTIC MEASURES, UNSPECIFIED: ICD-10-CM

## 2025-08-06 DIAGNOSIS — D69.6 THROMBOCYTOPENIA, UNSPECIFIED: ICD-10-CM

## 2025-08-06 DIAGNOSIS — Z98.89 OTHER SPECIFIED POSTPROCEDURAL STATES: Chronic | ICD-10-CM

## 2025-08-06 DIAGNOSIS — R06.02 SHORTNESS OF BREATH: ICD-10-CM

## 2025-08-06 DIAGNOSIS — J44.9 CHRONIC OBSTRUCTIVE PULMONARY DISEASE, UNSPECIFIED: ICD-10-CM

## 2025-08-06 DIAGNOSIS — I48.0 PAROXYSMAL ATRIAL FIBRILLATION: ICD-10-CM

## 2025-08-06 DIAGNOSIS — J96.21 ACUTE AND CHRONIC RESPIRATORY FAILURE WITH HYPOXIA: ICD-10-CM

## 2025-08-06 DIAGNOSIS — Z90.710 ACQUIRED ABSENCE OF BOTH CERVIX AND UTERUS: Chronic | ICD-10-CM

## 2025-08-06 DIAGNOSIS — I50.23 ACUTE ON CHRONIC SYSTOLIC (CONGESTIVE) HEART FAILURE: ICD-10-CM

## 2025-08-06 LAB
ALBUMIN SERPL ELPH-MCNC: 4.1 G/DL — SIGNIFICANT CHANGE UP (ref 3.3–5)
ALP SERPL-CCNC: 78 U/L — SIGNIFICANT CHANGE UP (ref 40–120)
ALT FLD-CCNC: 17 U/L — SIGNIFICANT CHANGE UP (ref 10–45)
ANION GAP SERPL CALC-SCNC: 10 MMOL/L — SIGNIFICANT CHANGE UP (ref 5–17)
ANION GAP SERPL CALC-SCNC: 7 MMOL/L — SIGNIFICANT CHANGE UP (ref 5–17)
AST SERPL-CCNC: 15 U/L — SIGNIFICANT CHANGE UP (ref 10–40)
BASOPHILS # BLD AUTO: 0.02 K/UL — SIGNIFICANT CHANGE UP (ref 0–0.2)
BASOPHILS NFR BLD AUTO: 0.5 % — SIGNIFICANT CHANGE UP (ref 0–2)
BILIRUB SERPL-MCNC: 0.4 MG/DL — SIGNIFICANT CHANGE UP (ref 0.2–1.2)
BUN SERPL-MCNC: 60 MG/DL — HIGH (ref 7–23)
BUN SERPL-MCNC: 64 MG/DL — HIGH (ref 7–23)
CALCIUM SERPL-MCNC: 9.7 MG/DL — SIGNIFICANT CHANGE UP (ref 8.4–10.5)
CALCIUM SERPL-MCNC: 9.7 MG/DL — SIGNIFICANT CHANGE UP (ref 8.4–10.5)
CHLORIDE SERPL-SCNC: 95 MMOL/L — LOW (ref 96–108)
CHLORIDE SERPL-SCNC: 96 MMOL/L — SIGNIFICANT CHANGE UP (ref 96–108)
CO2 SERPL-SCNC: 37 MMOL/L — HIGH (ref 22–31)
CO2 SERPL-SCNC: 37 MMOL/L — HIGH (ref 22–31)
CREAT SERPL-MCNC: 1.11 MG/DL — SIGNIFICANT CHANGE UP (ref 0.5–1.3)
CREAT SERPL-MCNC: 1.16 MG/DL — SIGNIFICANT CHANGE UP (ref 0.5–1.3)
EGFR: 46 ML/MIN/1.73M2 — LOW
EGFR: 46 ML/MIN/1.73M2 — LOW
EGFR: 49 ML/MIN/1.73M2 — LOW
EGFR: 49 ML/MIN/1.73M2 — LOW
EOSINOPHIL # BLD AUTO: 0.11 K/UL — SIGNIFICANT CHANGE UP (ref 0–0.5)
EOSINOPHIL NFR BLD AUTO: 2.6 % — SIGNIFICANT CHANGE UP (ref 0–6)
GAS PNL BLDV: SIGNIFICANT CHANGE UP
GLUCOSE SERPL-MCNC: 88 MG/DL — SIGNIFICANT CHANGE UP (ref 70–99)
GLUCOSE SERPL-MCNC: 92 MG/DL — SIGNIFICANT CHANGE UP (ref 70–99)
HCT VFR BLD CALC: 37.3 % — SIGNIFICANT CHANGE UP (ref 34.5–45)
HGB BLD-MCNC: 10.5 G/DL — LOW (ref 11.5–15.5)
IMM GRANULOCYTES # BLD AUTO: 0 K/UL — SIGNIFICANT CHANGE UP (ref 0–0.07)
IMM GRANULOCYTES NFR BLD AUTO: 0 % — SIGNIFICANT CHANGE UP (ref 0–0.9)
IMMATURE PLATELET FRACTION #: 3.3 K/UL — LOW (ref 4.7–11.1)
IMMATURE PLATELET FRACTION %: 3.8 % — SIGNIFICANT CHANGE UP (ref 1.6–4.9)
LYMPHOCYTES # BLD AUTO: 0.51 K/UL — LOW (ref 1–3.3)
LYMPHOCYTES NFR BLD AUTO: 12.1 % — LOW (ref 13–44)
MAGNESIUM SERPL-MCNC: 2.8 MG/DL — HIGH (ref 1.6–2.6)
MCHC RBC-ENTMCNC: 26.7 PG — LOW (ref 27–34)
MCHC RBC-ENTMCNC: 28.2 G/DL — LOW (ref 32–36)
MCV RBC AUTO: 94.9 FL — SIGNIFICANT CHANGE UP (ref 80–100)
MONOCYTES # BLD AUTO: 0.36 K/UL — SIGNIFICANT CHANGE UP (ref 0–0.9)
MONOCYTES NFR BLD AUTO: 8.5 % — SIGNIFICANT CHANGE UP (ref 2–14)
NEUTROPHILS # BLD AUTO: 3.23 K/UL — SIGNIFICANT CHANGE UP (ref 1.8–7.4)
NEUTROPHILS NFR BLD AUTO: 76.3 % — SIGNIFICANT CHANGE UP (ref 43–77)
NRBC # BLD AUTO: 0 K/UL — SIGNIFICANT CHANGE UP (ref 0–0)
NRBC # FLD: 0 K/UL — SIGNIFICANT CHANGE UP (ref 0–0)
NRBC BLD AUTO-RTO: 0 /100 WBCS — SIGNIFICANT CHANGE UP (ref 0–0)
NT-PROBNP SERPL-SCNC: 1679 PG/ML — HIGH (ref 0–300)
PLATELET # BLD AUTO: 88 K/UL — LOW (ref 150–400)
PMV BLD: 10.5 FL — SIGNIFICANT CHANGE UP (ref 7–13)
POTASSIUM SERPL-MCNC: 5.4 MMOL/L — HIGH (ref 3.5–5.3)
POTASSIUM SERPL-MCNC: 5.6 MMOL/L — HIGH (ref 3.5–5.3)
POTASSIUM SERPL-SCNC: 5.4 MMOL/L — HIGH (ref 3.5–5.3)
POTASSIUM SERPL-SCNC: 5.6 MMOL/L — HIGH (ref 3.5–5.3)
PROT SERPL-MCNC: 7.5 G/DL — SIGNIFICANT CHANGE UP (ref 6–8.3)
RBC # BLD: 3.93 M/UL — SIGNIFICANT CHANGE UP (ref 3.8–5.2)
RBC # FLD: 15.7 % — HIGH (ref 10.3–14.5)
SODIUM SERPL-SCNC: 140 MMOL/L — SIGNIFICANT CHANGE UP (ref 135–145)
SODIUM SERPL-SCNC: 142 MMOL/L — SIGNIFICANT CHANGE UP (ref 135–145)
TROPONIN T, HIGH SENSITIVITY RESULT: 43 NG/L — SIGNIFICANT CHANGE UP (ref 0–51)
WBC # BLD: 4.23 K/UL — SIGNIFICANT CHANGE UP (ref 3.8–10.5)
WBC # FLD AUTO: 4.23 K/UL — SIGNIFICANT CHANGE UP (ref 3.8–10.5)

## 2025-08-06 PROCEDURE — 99223 1ST HOSP IP/OBS HIGH 75: CPT

## 2025-08-06 PROCEDURE — 85025 COMPLETE CBC W/AUTO DIFF WBC: CPT

## 2025-08-06 PROCEDURE — 85014 HEMATOCRIT: CPT

## 2025-08-06 PROCEDURE — 36415 COLL VENOUS BLD VENIPUNCTURE: CPT

## 2025-08-06 PROCEDURE — 82947 ASSAY GLUCOSE BLOOD QUANT: CPT

## 2025-08-06 PROCEDURE — 82435 ASSAY OF BLOOD CHLORIDE: CPT

## 2025-08-06 PROCEDURE — 83605 ASSAY OF LACTIC ACID: CPT

## 2025-08-06 PROCEDURE — 82803 BLOOD GASES ANY COMBINATION: CPT

## 2025-08-06 PROCEDURE — 99497 ADVNCD CARE PLAN 30 MIN: CPT | Mod: 25

## 2025-08-06 PROCEDURE — 71045 X-RAY EXAM CHEST 1 VIEW: CPT | Mod: 26

## 2025-08-06 PROCEDURE — 99285 EMERGENCY DEPT VISIT HI MDM: CPT | Mod: GC

## 2025-08-06 PROCEDURE — 80053 COMPREHEN METABOLIC PANEL: CPT

## 2025-08-06 PROCEDURE — 93010 ELECTROCARDIOGRAM REPORT: CPT

## 2025-08-06 PROCEDURE — 84132 ASSAY OF SERUM POTASSIUM: CPT

## 2025-08-06 PROCEDURE — 71045 X-RAY EXAM CHEST 1 VIEW: CPT

## 2025-08-06 PROCEDURE — 85018 HEMOGLOBIN: CPT

## 2025-08-06 PROCEDURE — 83880 ASSAY OF NATRIURETIC PEPTIDE: CPT

## 2025-08-06 PROCEDURE — 80048 BASIC METABOLIC PNL TOTAL CA: CPT

## 2025-08-06 PROCEDURE — 84484 ASSAY OF TROPONIN QUANT: CPT

## 2025-08-06 PROCEDURE — 83735 ASSAY OF MAGNESIUM: CPT

## 2025-08-06 PROCEDURE — 84295 ASSAY OF SERUM SODIUM: CPT

## 2025-08-06 PROCEDURE — 94660 CPAP INITIATION&MGMT: CPT

## 2025-08-06 PROCEDURE — 82330 ASSAY OF CALCIUM: CPT

## 2025-08-06 RX ORDER — FUROSEMIDE 10 MG/ML
80 INJECTION INTRAMUSCULAR; INTRAVENOUS ONCE
Refills: 0 | Status: COMPLETED | OUTPATIENT
Start: 2025-08-06 | End: 2025-08-06

## 2025-08-06 RX ORDER — APIXABAN 5 MG/1
2.5 TABLET, FILM COATED ORAL
Refills: 0 | Status: DISCONTINUED | OUTPATIENT
Start: 2025-08-06 | End: 2025-08-14

## 2025-08-06 RX ORDER — FUROSEMIDE 10 MG/ML
40 INJECTION INTRAMUSCULAR; INTRAVENOUS
Refills: 0 | Status: DISCONTINUED | OUTPATIENT
Start: 2025-08-06 | End: 2025-08-10

## 2025-08-06 RX ORDER — CARVEDILOL 3.12 MG/1
3.12 TABLET, FILM COATED ORAL EVERY 12 HOURS
Refills: 0 | Status: DISCONTINUED | OUTPATIENT
Start: 2025-08-06 | End: 2025-08-14

## 2025-08-06 RX ORDER — CARVEDILOL 3.12 MG/1
3.12 TABLET, FILM COATED ORAL EVERY 12 HOURS
Refills: 0 | Status: DISCONTINUED | OUTPATIENT
Start: 2025-08-06 | End: 2025-08-06

## 2025-08-06 RX ORDER — SACUBITRIL AND VALSARTAN 6; 6 MG/1; MG/1
1 PELLET ORAL
Refills: 0 | Status: DISCONTINUED | OUTPATIENT
Start: 2025-08-06 | End: 2025-08-14

## 2025-08-06 RX ORDER — CARVEDILOL 3.12 MG/1
1 TABLET, FILM COATED ORAL
Refills: 0 | DISCHARGE

## 2025-08-06 RX ORDER — IPRATROPIUM BROMIDE AND ALBUTEROL SULFATE .5; 2.5 MG/3ML; MG/3ML
3 SOLUTION RESPIRATORY (INHALATION) EVERY 6 HOURS
Refills: 0 | Status: DISCONTINUED | OUTPATIENT
Start: 2025-08-06 | End: 2025-08-07

## 2025-08-06 RX ORDER — ACETAMINOPHEN 500 MG/5ML
650 LIQUID (ML) ORAL EVERY 6 HOURS
Refills: 0 | Status: DISCONTINUED | OUTPATIENT
Start: 2025-08-06 | End: 2025-08-14

## 2025-08-06 RX ORDER — FUROSEMIDE 10 MG/ML
40 INJECTION INTRAMUSCULAR; INTRAVENOUS ONCE
Refills: 0 | Status: DISCONTINUED | OUTPATIENT
Start: 2025-08-06 | End: 2025-08-06

## 2025-08-06 RX ORDER — MELATONIN 5 MG
3 TABLET ORAL AT BEDTIME
Refills: 0 | Status: DISCONTINUED | OUTPATIENT
Start: 2025-08-06 | End: 2025-08-14

## 2025-08-06 RX ADMIN — APIXABAN 2.5 MILLIGRAM(S): 5 TABLET, FILM COATED ORAL at 20:24

## 2025-08-06 RX ADMIN — SACUBITRIL AND VALSARTAN 1 TABLET(S): 6; 6 PELLET ORAL at 20:52

## 2025-08-06 RX ADMIN — CARVEDILOL 3.12 MILLIGRAM(S): 3.12 TABLET, FILM COATED ORAL at 20:52

## 2025-08-06 RX ADMIN — FUROSEMIDE 80 MILLIGRAM(S): 10 INJECTION INTRAMUSCULAR; INTRAVENOUS at 15:24

## 2025-08-07 LAB
ALBUMIN SERPL ELPH-MCNC: 3.9 G/DL — SIGNIFICANT CHANGE UP (ref 3.3–5)
ALP SERPL-CCNC: 70 U/L — SIGNIFICANT CHANGE UP (ref 40–120)
ALT FLD-CCNC: 16 U/L — SIGNIFICANT CHANGE UP (ref 10–45)
ANION GAP SERPL CALC-SCNC: 7 MMOL/L — SIGNIFICANT CHANGE UP (ref 5–17)
AST SERPL-CCNC: 14 U/L — SIGNIFICANT CHANGE UP (ref 10–40)
BASE EXCESS BLDA CALC-SCNC: 15.1 MMOL/L — HIGH (ref -2–3)
BILIRUB SERPL-MCNC: 0.4 MG/DL — SIGNIFICANT CHANGE UP (ref 0.2–1.2)
BUN SERPL-MCNC: 55 MG/DL — HIGH (ref 7–23)
CALCIUM SERPL-MCNC: 9.5 MG/DL — SIGNIFICANT CHANGE UP (ref 8.4–10.5)
CHLORIDE SERPL-SCNC: 96 MMOL/L — SIGNIFICANT CHANGE UP (ref 96–108)
CHOLEST SERPL-MCNC: 148 MG/DL — SIGNIFICANT CHANGE UP
CO2 BLDA-SCNC: 48 MMOL/L — CRITICAL HIGH (ref 19–24)
CO2 SERPL-SCNC: 40 MMOL/L — HIGH (ref 22–31)
CREAT SERPL-MCNC: 1.13 MG/DL — SIGNIFICANT CHANGE UP (ref 0.5–1.3)
EGFR: 48 ML/MIN/1.73M2 — LOW
EGFR: 48 ML/MIN/1.73M2 — LOW
FLUAV AG NPH QL: SIGNIFICANT CHANGE UP
FLUBV AG NPH QL: SIGNIFICANT CHANGE UP
GAS PNL BLDA: SIGNIFICANT CHANGE UP
GAS PNL BLDV: SIGNIFICANT CHANGE UP
GAS PNL BLDV: SIGNIFICANT CHANGE UP
GLUCOSE SERPL-MCNC: 83 MG/DL — SIGNIFICANT CHANGE UP (ref 70–99)
HCO3 BLDA-SCNC: 45 MMOL/L — CRITICAL HIGH (ref 21–28)
HCT VFR BLD CALC: 35 % — SIGNIFICANT CHANGE UP (ref 34.5–45)
HDLC SERPL-MCNC: 65 MG/DL — SIGNIFICANT CHANGE UP
HGB BLD-MCNC: 9.9 G/DL — LOW (ref 11.5–15.5)
HOROWITZ INDEX BLDA+IHG-RTO: 40 — SIGNIFICANT CHANGE UP
IMMATURE PLATELET FRACTION #: 2.3 K/UL — LOW (ref 4.7–11.1)
IMMATURE PLATELET FRACTION %: 2.8 % — SIGNIFICANT CHANGE UP (ref 1.6–4.9)
LDLC SERPL-MCNC: 74 MG/DL — SIGNIFICANT CHANGE UP
LIPID PNL WITH DIRECT LDL SERPL: 74 MG/DL — SIGNIFICANT CHANGE UP
MAGNESIUM SERPL-MCNC: 2.5 MG/DL — SIGNIFICANT CHANGE UP (ref 1.6–2.6)
MCHC RBC-ENTMCNC: 26.7 PG — LOW (ref 27–34)
MCHC RBC-ENTMCNC: 28.3 G/DL — LOW (ref 32–36)
MCV RBC AUTO: 94.3 FL — SIGNIFICANT CHANGE UP (ref 80–100)
NONHDLC SERPL-MCNC: 83 MG/DL — SIGNIFICANT CHANGE UP
NRBC # BLD AUTO: 0 K/UL — SIGNIFICANT CHANGE UP (ref 0–0)
NRBC # FLD: 0 K/UL — SIGNIFICANT CHANGE UP (ref 0–0)
NRBC BLD AUTO-RTO: 0 /100 WBCS — SIGNIFICANT CHANGE UP (ref 0–0)
PCO2 BLDA: 86 MMHG — CRITICAL HIGH (ref 32–45)
PH BLDA: 7.33 — LOW (ref 7.35–7.45)
PLATELET # BLD AUTO: 81 K/UL — LOW (ref 150–400)
PMV BLD: 10.9 FL — SIGNIFICANT CHANGE UP (ref 7–13)
PO2 BLDA: 79 MMHG — LOW (ref 83–108)
POTASSIUM SERPL-MCNC: 5.1 MMOL/L — SIGNIFICANT CHANGE UP (ref 3.5–5.3)
POTASSIUM SERPL-SCNC: 5.1 MMOL/L — SIGNIFICANT CHANGE UP (ref 3.5–5.3)
PROT SERPL-MCNC: 6.9 G/DL — SIGNIFICANT CHANGE UP (ref 6–8.3)
RBC # BLD: 3.71 M/UL — LOW (ref 3.8–5.2)
RBC # FLD: 15.5 % — HIGH (ref 10.3–14.5)
RSV RNA NPH QL NAA+NON-PROBE: SIGNIFICANT CHANGE UP
SAO2 % BLDA: 98.1 % — HIGH (ref 94–98)
SARS-COV-2 RNA SPEC QL NAA+PROBE: SIGNIFICANT CHANGE UP
SODIUM SERPL-SCNC: 143 MMOL/L — SIGNIFICANT CHANGE UP (ref 135–145)
SOURCE RESPIRATORY: SIGNIFICANT CHANGE UP
TRIGL SERPL-MCNC: 39 MG/DL — SIGNIFICANT CHANGE UP
WBC # BLD: 4.07 K/UL — SIGNIFICANT CHANGE UP (ref 3.8–10.5)
WBC # FLD AUTO: 4.07 K/UL — SIGNIFICANT CHANGE UP (ref 3.8–10.5)

## 2025-08-07 PROCEDURE — 85018 HEMOGLOBIN: CPT

## 2025-08-07 PROCEDURE — 80053 COMPREHEN METABOLIC PANEL: CPT

## 2025-08-07 PROCEDURE — 84132 ASSAY OF SERUM POTASSIUM: CPT

## 2025-08-07 PROCEDURE — 82803 BLOOD GASES ANY COMBINATION: CPT

## 2025-08-07 PROCEDURE — 85027 COMPLETE CBC AUTOMATED: CPT

## 2025-08-07 PROCEDURE — 84484 ASSAY OF TROPONIN QUANT: CPT

## 2025-08-07 PROCEDURE — 71045 X-RAY EXAM CHEST 1 VIEW: CPT

## 2025-08-07 PROCEDURE — 80048 BASIC METABOLIC PNL TOTAL CA: CPT

## 2025-08-07 PROCEDURE — 83605 ASSAY OF LACTIC ACID: CPT

## 2025-08-07 PROCEDURE — 84295 ASSAY OF SERUM SODIUM: CPT

## 2025-08-07 PROCEDURE — 36415 COLL VENOUS BLD VENIPUNCTURE: CPT

## 2025-08-07 PROCEDURE — 85025 COMPLETE CBC W/AUTO DIFF WBC: CPT

## 2025-08-07 PROCEDURE — 94640 AIRWAY INHALATION TREATMENT: CPT

## 2025-08-07 PROCEDURE — 82947 ASSAY GLUCOSE BLOOD QUANT: CPT

## 2025-08-07 PROCEDURE — 87637 SARSCOV2&INF A&B&RSV AMP PRB: CPT

## 2025-08-07 PROCEDURE — 83880 ASSAY OF NATRIURETIC PEPTIDE: CPT

## 2025-08-07 PROCEDURE — 80061 LIPID PANEL: CPT

## 2025-08-07 PROCEDURE — 82435 ASSAY OF BLOOD CHLORIDE: CPT

## 2025-08-07 PROCEDURE — 82330 ASSAY OF CALCIUM: CPT

## 2025-08-07 PROCEDURE — 85014 HEMATOCRIT: CPT

## 2025-08-07 PROCEDURE — 36600 WITHDRAWAL OF ARTERIAL BLOOD: CPT

## 2025-08-07 PROCEDURE — 94660 CPAP INITIATION&MGMT: CPT

## 2025-08-07 PROCEDURE — 83735 ASSAY OF MAGNESIUM: CPT

## 2025-08-07 RX ORDER — IPRATROPIUM BROMIDE AND ALBUTEROL SULFATE .5; 2.5 MG/3ML; MG/3ML
3 SOLUTION RESPIRATORY (INHALATION) EVERY 6 HOURS
Refills: 0 | Status: DISCONTINUED | OUTPATIENT
Start: 2025-08-07 | End: 2025-08-14

## 2025-08-07 RX ORDER — BUDESONIDE 0.25 MG/2ML
0.5 SUSPENSION RESPIRATORY (INHALATION) EVERY 12 HOURS
Refills: 0 | Status: DISCONTINUED | OUTPATIENT
Start: 2025-08-07 | End: 2025-08-14

## 2025-08-07 RX ORDER — FUROSEMIDE 10 MG/ML
20 INJECTION INTRAMUSCULAR; INTRAVENOUS ONCE
Refills: 0 | Status: COMPLETED | OUTPATIENT
Start: 2025-08-07 | End: 2025-08-07

## 2025-08-07 RX ORDER — B1/B2/B3/B5/B6/B12/VIT C/FOLIC 500-0.5 MG
1 TABLET ORAL DAILY
Refills: 0 | Status: DISCONTINUED | OUTPATIENT
Start: 2025-08-07 | End: 2025-08-14

## 2025-08-07 RX ADMIN — BUDESONIDE 0.5 MILLIGRAM(S): 0.25 SUSPENSION RESPIRATORY (INHALATION) at 18:05

## 2025-08-07 RX ADMIN — FUROSEMIDE 40 MILLIGRAM(S): 10 INJECTION INTRAMUSCULAR; INTRAVENOUS at 05:25

## 2025-08-07 RX ADMIN — FUROSEMIDE 20 MILLIGRAM(S): 10 INJECTION INTRAMUSCULAR; INTRAVENOUS at 21:08

## 2025-08-07 RX ADMIN — IPRATROPIUM BROMIDE AND ALBUTEROL SULFATE 3 MILLILITER(S): .5; 2.5 SOLUTION RESPIRATORY (INHALATION) at 23:19

## 2025-08-07 RX ADMIN — APIXABAN 2.5 MILLIGRAM(S): 5 TABLET, FILM COATED ORAL at 18:30

## 2025-08-07 RX ADMIN — SACUBITRIL AND VALSARTAN 1 TABLET(S): 6; 6 PELLET ORAL at 18:30

## 2025-08-07 RX ADMIN — CARVEDILOL 3.12 MILLIGRAM(S): 3.12 TABLET, FILM COATED ORAL at 05:25

## 2025-08-07 RX ADMIN — APIXABAN 2.5 MILLIGRAM(S): 5 TABLET, FILM COATED ORAL at 05:25

## 2025-08-07 RX ADMIN — IPRATROPIUM BROMIDE AND ALBUTEROL SULFATE 3 MILLILITER(S): .5; 2.5 SOLUTION RESPIRATORY (INHALATION) at 18:03

## 2025-08-07 RX ADMIN — SACUBITRIL AND VALSARTAN 1 TABLET(S): 6; 6 PELLET ORAL at 05:25

## 2025-08-08 ENCOUNTER — RESULT REVIEW (OUTPATIENT)
Age: 85
End: 2025-08-08

## 2025-08-08 DIAGNOSIS — J44.9 CHRONIC OBSTRUCTIVE PULMONARY DISEASE, UNSPECIFIED: ICD-10-CM

## 2025-08-08 LAB
ANION GAP SERPL CALC-SCNC: 10 MMOL/L — SIGNIFICANT CHANGE UP (ref 5–17)
BASE EXCESS BLDA CALC-SCNC: 15.9 MMOL/L — HIGH (ref -2–3)
BUN SERPL-MCNC: 50 MG/DL — HIGH (ref 7–23)
CALCIUM SERPL-MCNC: 9.4 MG/DL — SIGNIFICANT CHANGE UP (ref 8.4–10.5)
CHLORIDE SERPL-SCNC: 94 MMOL/L — LOW (ref 96–108)
CO2 BLDA-SCNC: 48 MMOL/L — CRITICAL HIGH (ref 19–24)
CO2 SERPL-SCNC: 39 MMOL/L — HIGH (ref 22–31)
CREAT SERPL-MCNC: 1.16 MG/DL — SIGNIFICANT CHANGE UP (ref 0.5–1.3)
EGFR: 46 ML/MIN/1.73M2 — LOW
EGFR: 46 ML/MIN/1.73M2 — LOW
GAS PNL BLDA: SIGNIFICANT CHANGE UP
GLUCOSE SERPL-MCNC: 85 MG/DL — SIGNIFICANT CHANGE UP (ref 70–99)
HCO3 BLDA-SCNC: 46 MMOL/L — CRITICAL HIGH (ref 21–28)
HCT VFR BLD CALC: 35 % — SIGNIFICANT CHANGE UP (ref 34.5–45)
HGB BLD-MCNC: 10 G/DL — LOW (ref 11.5–15.5)
IMMATURE PLATELET FRACTION #: 2.5 K/UL — LOW (ref 4.7–11.1)
IMMATURE PLATELET FRACTION %: 3.1 % — SIGNIFICANT CHANGE UP (ref 1.6–4.9)
MAGNESIUM SERPL-MCNC: 2.4 MG/DL — SIGNIFICANT CHANGE UP (ref 1.6–2.6)
MCHC RBC-ENTMCNC: 26.8 PG — LOW (ref 27–34)
MCHC RBC-ENTMCNC: 28.6 G/DL — LOW (ref 32–36)
MCV RBC AUTO: 93.8 FL — SIGNIFICANT CHANGE UP (ref 80–100)
NRBC # BLD AUTO: 0 K/UL — SIGNIFICANT CHANGE UP (ref 0–0)
NRBC # FLD: 0 K/UL — SIGNIFICANT CHANGE UP (ref 0–0)
NRBC BLD AUTO-RTO: 0 /100 WBCS — SIGNIFICANT CHANGE UP (ref 0–0)
PCO2 BLDA: 83 MMHG — CRITICAL HIGH (ref 32–45)
PH BLDA: 7.35 — SIGNIFICANT CHANGE UP (ref 7.35–7.45)
PLATELET # BLD AUTO: 81 K/UL — LOW (ref 150–400)
PMV BLD: 10.5 FL — SIGNIFICANT CHANGE UP (ref 7–13)
PO2 BLDA: 89 MMHG — SIGNIFICANT CHANGE UP (ref 83–108)
POTASSIUM SERPL-MCNC: 5 MMOL/L — SIGNIFICANT CHANGE UP (ref 3.5–5.3)
POTASSIUM SERPL-SCNC: 5 MMOL/L — SIGNIFICANT CHANGE UP (ref 3.5–5.3)
RAPID RVP RESULT: SIGNIFICANT CHANGE UP
RBC # BLD: 3.73 M/UL — LOW (ref 3.8–5.2)
RBC # FLD: 15.4 % — HIGH (ref 10.3–14.5)
SAO2 % BLDA: 98.5 % — HIGH (ref 94–98)
SARS-COV-2 RNA SPEC QL NAA+PROBE: SIGNIFICANT CHANGE UP
SODIUM SERPL-SCNC: 143 MMOL/L — SIGNIFICANT CHANGE UP (ref 135–145)
WBC # BLD: 6.32 K/UL — SIGNIFICANT CHANGE UP (ref 3.8–10.5)
WBC # FLD AUTO: 6.32 K/UL — SIGNIFICANT CHANGE UP (ref 3.8–10.5)

## 2025-08-08 PROCEDURE — 93306 TTE W/DOPPLER COMPLETE: CPT | Mod: 26

## 2025-08-08 PROCEDURE — 71250 CT THORAX DX C-: CPT | Mod: 26

## 2025-08-08 RX ORDER — CEFTRIAXONE 500 MG/1
1000 INJECTION, POWDER, FOR SOLUTION INTRAMUSCULAR; INTRAVENOUS EVERY 24 HOURS
Refills: 0 | Status: COMPLETED | OUTPATIENT
Start: 2025-08-09 | End: 2025-08-13

## 2025-08-08 RX ORDER — CEFTRIAXONE 500 MG/1
INJECTION, POWDER, FOR SOLUTION INTRAMUSCULAR; INTRAVENOUS
Refills: 0 | Status: COMPLETED | OUTPATIENT
Start: 2025-08-08 | End: 2025-08-14

## 2025-08-08 RX ORDER — CEFTRIAXONE 500 MG/1
1000 INJECTION, POWDER, FOR SOLUTION INTRAMUSCULAR; INTRAVENOUS ONCE
Refills: 0 | Status: COMPLETED | OUTPATIENT
Start: 2025-08-08 | End: 2025-08-08

## 2025-08-08 RX ADMIN — IPRATROPIUM BROMIDE AND ALBUTEROL SULFATE 3 MILLILITER(S): .5; 2.5 SOLUTION RESPIRATORY (INHALATION) at 11:01

## 2025-08-08 RX ADMIN — IPRATROPIUM BROMIDE AND ALBUTEROL SULFATE 3 MILLILITER(S): .5; 2.5 SOLUTION RESPIRATORY (INHALATION) at 23:15

## 2025-08-08 RX ADMIN — BUDESONIDE 0.5 MILLIGRAM(S): 0.25 SUSPENSION RESPIRATORY (INHALATION) at 05:16

## 2025-08-08 RX ADMIN — FUROSEMIDE 40 MILLIGRAM(S): 10 INJECTION INTRAMUSCULAR; INTRAVENOUS at 08:23

## 2025-08-08 RX ADMIN — Medication 1 TABLET(S): at 12:17

## 2025-08-08 RX ADMIN — IPRATROPIUM BROMIDE AND ALBUTEROL SULFATE 3 MILLILITER(S): .5; 2.5 SOLUTION RESPIRATORY (INHALATION) at 18:03

## 2025-08-08 RX ADMIN — IPRATROPIUM BROMIDE AND ALBUTEROL SULFATE 3 MILLILITER(S): .5; 2.5 SOLUTION RESPIRATORY (INHALATION) at 05:16

## 2025-08-08 RX ADMIN — APIXABAN 2.5 MILLIGRAM(S): 5 TABLET, FILM COATED ORAL at 18:02

## 2025-08-08 RX ADMIN — CARVEDILOL 3.12 MILLIGRAM(S): 3.12 TABLET, FILM COATED ORAL at 08:23

## 2025-08-08 RX ADMIN — CEFTRIAXONE 100 MILLIGRAM(S): 500 INJECTION, POWDER, FOR SOLUTION INTRAMUSCULAR; INTRAVENOUS at 19:30

## 2025-08-08 RX ADMIN — Medication 500 MILLIGRAM(S): at 12:17

## 2025-08-08 RX ADMIN — FUROSEMIDE 40 MILLIGRAM(S): 10 INJECTION INTRAMUSCULAR; INTRAVENOUS at 14:21

## 2025-08-08 RX ADMIN — APIXABAN 2.5 MILLIGRAM(S): 5 TABLET, FILM COATED ORAL at 05:19

## 2025-08-08 RX ADMIN — BUDESONIDE 0.5 MILLIGRAM(S): 0.25 SUSPENSION RESPIRATORY (INHALATION) at 18:03

## 2025-08-08 RX ADMIN — SACUBITRIL AND VALSARTAN 1 TABLET(S): 6; 6 PELLET ORAL at 08:23

## 2025-08-09 DIAGNOSIS — J18.9 PNEUMONIA, UNSPECIFIED ORGANISM: ICD-10-CM

## 2025-08-09 DIAGNOSIS — R13.10 DYSPHAGIA, UNSPECIFIED: ICD-10-CM

## 2025-08-09 LAB
ANION GAP SERPL CALC-SCNC: 7 MMOL/L — SIGNIFICANT CHANGE UP (ref 5–17)
BASE EXCESS BLDA CALC-SCNC: 22.9 MMOL/L — HIGH (ref -2–3)
BASOPHILS # BLD AUTO: 0.01 K/UL — SIGNIFICANT CHANGE UP (ref 0–0.2)
BASOPHILS # BLD MANUAL: 0 K/UL — SIGNIFICANT CHANGE UP (ref 0–0.2)
BASOPHILS NFR BLD AUTO: 0.1 % — SIGNIFICANT CHANGE UP (ref 0–2)
BASOPHILS NFR BLD MANUAL: 0 % — SIGNIFICANT CHANGE UP (ref 0–2)
BUN SERPL-MCNC: 47 MG/DL — HIGH (ref 7–23)
CALCIUM SERPL-MCNC: 9.6 MG/DL — SIGNIFICANT CHANGE UP (ref 8.4–10.5)
CHLORIDE SERPL-SCNC: 96 MMOL/L — SIGNIFICANT CHANGE UP (ref 96–108)
CO2 BLDA-SCNC: 55 MMOL/L — CRITICAL HIGH (ref 19–24)
CO2 SERPL-SCNC: 44 MMOL/L — HIGH (ref 22–31)
CREAT SERPL-MCNC: 1.25 MG/DL — SIGNIFICANT CHANGE UP (ref 0.5–1.3)
EGFR: 42 ML/MIN/1.73M2 — LOW
EGFR: 42 ML/MIN/1.73M2 — LOW
EOSINOPHIL # BLD AUTO: 0.02 K/UL — SIGNIFICANT CHANGE UP (ref 0–0.5)
EOSINOPHIL # BLD MANUAL: 0 K/UL — SIGNIFICANT CHANGE UP (ref 0–0.5)
EOSINOPHIL NFR BLD AUTO: 0.3 % — SIGNIFICANT CHANGE UP (ref 0–6)
EOSINOPHIL NFR BLD MANUAL: 0 % — SIGNIFICANT CHANGE UP (ref 0–6)
GLUCOSE SERPL-MCNC: 87 MG/DL — SIGNIFICANT CHANGE UP (ref 70–99)
HCO3 BLDA-SCNC: 52 MMOL/L — CRITICAL HIGH (ref 21–28)
HCT VFR BLD CALC: 34.6 % — SIGNIFICANT CHANGE UP (ref 34.5–45)
HGB BLD-MCNC: 9.7 G/DL — LOW (ref 11.5–15.5)
IMM GRANULOCYTES # BLD AUTO: 0.01 K/UL — SIGNIFICANT CHANGE UP (ref 0–0.07)
IMM GRANULOCYTES NFR BLD AUTO: 0.1 % — SIGNIFICANT CHANGE UP (ref 0–0.9)
IMMATURE PLATELET FRACTION #: 2.8 K/UL — LOW (ref 4.7–11.1)
IMMATURE PLATELET FRACTION %: 4 % — SIGNIFICANT CHANGE UP (ref 1.6–4.9)
LYMPHOCYTES # BLD AUTO: 0.44 K/UL — LOW (ref 1–3.3)
LYMPHOCYTES # BLD MANUAL: 0.24 K/UL — LOW (ref 1–3.3)
LYMPHOCYTES NFR BLD AUTO: 6.5 % — LOW (ref 13–44)
LYMPHOCYTES NFR BLD MANUAL: 3.5 % — LOW (ref 13–44)
MANUAL NEUTROPHIL BANDS #: 0.35 K/UL — SIGNIFICANT CHANGE UP (ref 0–0.84)
MANUAL REACTIVE LYMPHOCYTES #: 0.06 K/UL — SIGNIFICANT CHANGE UP (ref 0–0.63)
MCHC RBC-ENTMCNC: 26.5 PG — LOW (ref 27–34)
MCHC RBC-ENTMCNC: 28 G/DL — LOW (ref 32–36)
MCV RBC AUTO: 94.5 FL — SIGNIFICANT CHANGE UP (ref 80–100)
MONOCYTES # BLD AUTO: 0.33 K/UL — SIGNIFICANT CHANGE UP (ref 0–0.9)
MONOCYTES # BLD MANUAL: 0.29 K/UL — SIGNIFICANT CHANGE UP (ref 0–0.9)
MONOCYTES NFR BLD AUTO: 4.9 % — SIGNIFICANT CHANGE UP (ref 2–14)
MONOCYTES NFR BLD MANUAL: 4.3 % — SIGNIFICANT CHANGE UP (ref 2–14)
NEUTROPHILS # BLD AUTO: 5.97 K/UL — SIGNIFICANT CHANGE UP (ref 1.8–7.4)
NEUTROPHILS # BLD MANUAL: 5.84 K/UL — SIGNIFICANT CHANGE UP (ref 1.8–7.4)
NEUTROPHILS NFR BLD AUTO: 88.1 % — HIGH (ref 43–77)
NEUTROPHILS NFR BLD MANUAL: 86.1 % — HIGH (ref 43–77)
NEUTS BAND # BLD: 5.2 % — SIGNIFICANT CHANGE UP (ref 0–8)
NEUTS BAND NFR BLD: 5.2 % — SIGNIFICANT CHANGE UP (ref 0–8)
NRBC # BLD AUTO: 0 K/UL — SIGNIFICANT CHANGE UP (ref 0–0)
NRBC # FLD: 0 K/UL — SIGNIFICANT CHANGE UP (ref 0–0)
NRBC BLD AUTO-RTO: 0 /100 WBCS — SIGNIFICANT CHANGE UP (ref 0–0)
PCO2 BLDA: 81 MMHG — CRITICAL HIGH (ref 32–45)
PH BLDA: 7.42 — SIGNIFICANT CHANGE UP (ref 7.35–7.45)
PLAT MORPH BLD: NORMAL — SIGNIFICANT CHANGE UP
PLATELET # BLD AUTO: 71 K/UL — LOW (ref 150–400)
PLATELET COUNT - ESTIMATE: ABNORMAL
PMV BLD: 10.9 FL — SIGNIFICANT CHANGE UP (ref 7–13)
PO2 BLDA: 82 MMHG — LOW (ref 83–108)
POTASSIUM SERPL-MCNC: 4.6 MMOL/L — SIGNIFICANT CHANGE UP (ref 3.5–5.3)
POTASSIUM SERPL-SCNC: 4.6 MMOL/L — SIGNIFICANT CHANGE UP (ref 3.5–5.3)
RBC # BLD: 3.66 M/UL — LOW (ref 3.8–5.2)
RBC # FLD: 15.5 % — HIGH (ref 10.3–14.5)
RBC BLD AUTO: SIGNIFICANT CHANGE UP
SAO2 % BLDA: 97.8 % — SIGNIFICANT CHANGE UP (ref 94–98)
SODIUM SERPL-SCNC: 147 MMOL/L — HIGH (ref 135–145)
VARIANT LYMPHS # BLD: 0.9 % — SIGNIFICANT CHANGE UP (ref 0–6)
VARIANT LYMPHS NFR BLD MANUAL: 0.9 % — SIGNIFICANT CHANGE UP (ref 0–6)
WBC # BLD: 6.78 K/UL — SIGNIFICANT CHANGE UP (ref 3.8–10.5)
WBC # FLD AUTO: 6.78 K/UL — SIGNIFICANT CHANGE UP (ref 3.8–10.5)

## 2025-08-09 RX ORDER — ACETYLCYSTEINE 200 MG/ML
3 INHALANT RESPIRATORY (INHALATION) EVERY 6 HOURS
Refills: 0 | Status: COMPLETED | OUTPATIENT
Start: 2025-08-09 | End: 2025-08-12

## 2025-08-09 RX ORDER — LANOLIN/MINERAL OIL/PETROLATUM
1 OINTMENT (GRAM) OPHTHALMIC (EYE) THREE TIMES A DAY
Refills: 0 | Status: DISCONTINUED | OUTPATIENT
Start: 2025-08-09 | End: 2025-08-14

## 2025-08-09 RX ORDER — SODIUM CHLORIDE 9 G/1000ML
1000 INJECTION, SOLUTION INTRAVENOUS
Refills: 0 | Status: DISCONTINUED | OUTPATIENT
Start: 2025-08-09 | End: 2025-08-10

## 2025-08-09 RX ADMIN — APIXABAN 2.5 MILLIGRAM(S): 5 TABLET, FILM COATED ORAL at 17:24

## 2025-08-09 RX ADMIN — FUROSEMIDE 40 MILLIGRAM(S): 10 INJECTION INTRAMUSCULAR; INTRAVENOUS at 13:01

## 2025-08-09 RX ADMIN — ACETYLCYSTEINE 3 MILLILITER(S): 200 INHALANT RESPIRATORY (INHALATION) at 17:24

## 2025-08-09 RX ADMIN — IPRATROPIUM BROMIDE AND ALBUTEROL SULFATE 3 MILLILITER(S): .5; 2.5 SOLUTION RESPIRATORY (INHALATION) at 17:23

## 2025-08-09 RX ADMIN — IPRATROPIUM BROMIDE AND ALBUTEROL SULFATE 3 MILLILITER(S): .5; 2.5 SOLUTION RESPIRATORY (INHALATION) at 23:03

## 2025-08-09 RX ADMIN — Medication 1 TABLET(S): at 11:24

## 2025-08-09 RX ADMIN — BUDESONIDE 0.5 MILLIGRAM(S): 0.25 SUSPENSION RESPIRATORY (INHALATION) at 17:24

## 2025-08-09 RX ADMIN — CEFTRIAXONE 100 MILLIGRAM(S): 500 INJECTION, POWDER, FOR SOLUTION INTRAMUSCULAR; INTRAVENOUS at 17:23

## 2025-08-09 RX ADMIN — ACETYLCYSTEINE 3 MILLILITER(S): 200 INHALANT RESPIRATORY (INHALATION) at 11:52

## 2025-08-09 RX ADMIN — SODIUM CHLORIDE 50 MILLILITER(S): 9 INJECTION, SOLUTION INTRAVENOUS at 11:24

## 2025-08-09 RX ADMIN — FUROSEMIDE 40 MILLIGRAM(S): 10 INJECTION INTRAMUSCULAR; INTRAVENOUS at 05:35

## 2025-08-09 RX ADMIN — APIXABAN 2.5 MILLIGRAM(S): 5 TABLET, FILM COATED ORAL at 05:34

## 2025-08-09 RX ADMIN — SACUBITRIL AND VALSARTAN 1 TABLET(S): 6; 6 PELLET ORAL at 17:24

## 2025-08-09 RX ADMIN — BUDESONIDE 0.5 MILLIGRAM(S): 0.25 SUSPENSION RESPIRATORY (INHALATION) at 05:35

## 2025-08-09 RX ADMIN — ACETYLCYSTEINE 3 MILLILITER(S): 200 INHALANT RESPIRATORY (INHALATION) at 23:03

## 2025-08-09 RX ADMIN — IPRATROPIUM BROMIDE AND ALBUTEROL SULFATE 3 MILLILITER(S): .5; 2.5 SOLUTION RESPIRATORY (INHALATION) at 05:35

## 2025-08-09 RX ADMIN — CARVEDILOL 3.12 MILLIGRAM(S): 3.12 TABLET, FILM COATED ORAL at 05:34

## 2025-08-09 RX ADMIN — CARVEDILOL 3.12 MILLIGRAM(S): 3.12 TABLET, FILM COATED ORAL at 17:24

## 2025-08-09 RX ADMIN — Medication 500 MILLIGRAM(S): at 11:24

## 2025-08-09 RX ADMIN — IPRATROPIUM BROMIDE AND ALBUTEROL SULFATE 3 MILLILITER(S): .5; 2.5 SOLUTION RESPIRATORY (INHALATION) at 11:24

## 2025-08-09 RX ADMIN — SACUBITRIL AND VALSARTAN 1 TABLET(S): 6; 6 PELLET ORAL at 05:34

## 2025-08-10 LAB
ALBUMIN SERPL ELPH-MCNC: 3.5 G/DL — SIGNIFICANT CHANGE UP (ref 3.3–5)
ALP SERPL-CCNC: 59 U/L — SIGNIFICANT CHANGE UP (ref 40–120)
ALT FLD-CCNC: 11 U/L — SIGNIFICANT CHANGE UP (ref 10–45)
ANION GAP SERPL CALC-SCNC: 9 MMOL/L — SIGNIFICANT CHANGE UP (ref 5–17)
AST SERPL-CCNC: 12 U/L — SIGNIFICANT CHANGE UP (ref 10–40)
BASOPHILS # BLD AUTO: 0.01 K/UL — SIGNIFICANT CHANGE UP (ref 0–0.2)
BASOPHILS NFR BLD AUTO: 0.2 % — SIGNIFICANT CHANGE UP (ref 0–2)
BILIRUB SERPL-MCNC: 0.7 MG/DL — SIGNIFICANT CHANGE UP (ref 0.2–1.2)
BUN SERPL-MCNC: 45 MG/DL — HIGH (ref 7–23)
CALCIUM SERPL-MCNC: 9.6 MG/DL — SIGNIFICANT CHANGE UP (ref 8.4–10.5)
CHLORIDE SERPL-SCNC: 95 MMOL/L — LOW (ref 96–108)
CO2 SERPL-SCNC: 44 MMOL/L — HIGH (ref 22–31)
CREAT SERPL-MCNC: 1.11 MG/DL — SIGNIFICANT CHANGE UP (ref 0.5–1.3)
EGFR: 49 ML/MIN/1.73M2 — LOW
EGFR: 49 ML/MIN/1.73M2 — LOW
EOSINOPHIL # BLD AUTO: 0.14 K/UL — SIGNIFICANT CHANGE UP (ref 0–0.5)
EOSINOPHIL NFR BLD AUTO: 2.5 % — SIGNIFICANT CHANGE UP (ref 0–6)
GAS PNL BLDV: SIGNIFICANT CHANGE UP
GLUCOSE BLDC GLUCOMTR-MCNC: 119 MG/DL — HIGH (ref 70–99)
GLUCOSE SERPL-MCNC: 64 MG/DL — LOW (ref 70–99)
HCT VFR BLD CALC: 35.2 % — SIGNIFICANT CHANGE UP (ref 34.5–45)
HGB BLD-MCNC: 10 G/DL — LOW (ref 11.5–15.5)
IMM GRANULOCYTES # BLD AUTO: 0.02 K/UL — SIGNIFICANT CHANGE UP (ref 0–0.07)
IMM GRANULOCYTES NFR BLD AUTO: 0.4 % — SIGNIFICANT CHANGE UP (ref 0–0.9)
IMMATURE PLATELET FRACTION #: 2.6 K/UL — LOW (ref 4.7–11.1)
IMMATURE PLATELET FRACTION %: 4 % — SIGNIFICANT CHANGE UP (ref 1.6–4.9)
LEGIONELLA AG UR QL: NEGATIVE — SIGNIFICANT CHANGE UP
LYMPHOCYTES # BLD AUTO: 0.51 K/UL — LOW (ref 1–3.3)
LYMPHOCYTES NFR BLD AUTO: 9.1 % — LOW (ref 13–44)
MAGNESIUM SERPL-MCNC: 2.5 MG/DL — SIGNIFICANT CHANGE UP (ref 1.6–2.6)
MCHC RBC-ENTMCNC: 27 PG — SIGNIFICANT CHANGE UP (ref 27–34)
MCHC RBC-ENTMCNC: 28.4 G/DL — LOW (ref 32–36)
MCV RBC AUTO: 95.1 FL — SIGNIFICANT CHANGE UP (ref 80–100)
MONOCYTES # BLD AUTO: 0.37 K/UL — SIGNIFICANT CHANGE UP (ref 0–0.9)
MONOCYTES NFR BLD AUTO: 6.6 % — SIGNIFICANT CHANGE UP (ref 2–14)
NEUTROPHILS # BLD AUTO: 4.58 K/UL — SIGNIFICANT CHANGE UP (ref 1.8–7.4)
NEUTROPHILS NFR BLD AUTO: 81.2 % — HIGH (ref 43–77)
NRBC # BLD AUTO: 0 K/UL — SIGNIFICANT CHANGE UP (ref 0–0)
NRBC # FLD: 0 K/UL — SIGNIFICANT CHANGE UP (ref 0–0)
NRBC BLD AUTO-RTO: 0 /100 WBCS — SIGNIFICANT CHANGE UP (ref 0–0)
PHOSPHATE SERPL-MCNC: 2.8 MG/DL — SIGNIFICANT CHANGE UP (ref 2.5–4.5)
PLATELET # BLD AUTO: 66 K/UL — LOW (ref 150–400)
PMV BLD: 11.4 FL — SIGNIFICANT CHANGE UP (ref 7–13)
POTASSIUM SERPL-MCNC: 3.9 MMOL/L — SIGNIFICANT CHANGE UP (ref 3.5–5.3)
POTASSIUM SERPL-SCNC: 3.9 MMOL/L — SIGNIFICANT CHANGE UP (ref 3.5–5.3)
PROT SERPL-MCNC: 6.9 G/DL — SIGNIFICANT CHANGE UP (ref 6–8.3)
RBC # BLD: 3.7 M/UL — LOW (ref 3.8–5.2)
RBC # FLD: 15.4 % — HIGH (ref 10.3–14.5)
S PNEUM AG UR QL: NEGATIVE — SIGNIFICANT CHANGE UP
SODIUM SERPL-SCNC: 148 MMOL/L — HIGH (ref 135–145)
WBC # BLD: 5.63 K/UL — SIGNIFICANT CHANGE UP (ref 3.8–10.5)
WBC # FLD AUTO: 5.63 K/UL — SIGNIFICANT CHANGE UP (ref 3.8–10.5)

## 2025-08-10 RX ORDER — FUROSEMIDE 10 MG/ML
40 INJECTION INTRAMUSCULAR; INTRAVENOUS
Refills: 0 | Status: DISCONTINUED | OUTPATIENT
Start: 2025-08-10 | End: 2025-08-10

## 2025-08-10 RX ORDER — ACETAMINOPHEN 500 MG/5ML
1000 LIQUID (ML) ORAL ONCE
Refills: 0 | Status: COMPLETED | OUTPATIENT
Start: 2025-08-10 | End: 2025-08-10

## 2025-08-10 RX ORDER — DEXTROSE 50 % IN WATER 50 %
50 SYRINGE (ML) INTRAVENOUS ONCE
Refills: 0 | Status: DISCONTINUED | OUTPATIENT
Start: 2025-08-10 | End: 2025-08-14

## 2025-08-10 RX ORDER — FUROSEMIDE 10 MG/ML
40 INJECTION INTRAMUSCULAR; INTRAVENOUS
Refills: 0 | Status: DISCONTINUED | OUTPATIENT
Start: 2025-08-11 | End: 2025-08-11

## 2025-08-10 RX ORDER — SODIUM CHLORIDE 9 G/1000ML
1000 INJECTION, SOLUTION INTRAVENOUS
Refills: 0 | Status: DISCONTINUED | OUTPATIENT
Start: 2025-08-10 | End: 2025-08-14

## 2025-08-10 RX ORDER — LIDOCAINE HYDROCHLORIDE 20 MG/ML
1 JELLY TOPICAL DAILY
Refills: 0 | Status: DISCONTINUED | OUTPATIENT
Start: 2025-08-10 | End: 2025-08-14

## 2025-08-10 RX ADMIN — ACETYLCYSTEINE 3 MILLILITER(S): 200 INHALANT RESPIRATORY (INHALATION) at 11:20

## 2025-08-10 RX ADMIN — IPRATROPIUM BROMIDE AND ALBUTEROL SULFATE 3 MILLILITER(S): .5; 2.5 SOLUTION RESPIRATORY (INHALATION) at 06:20

## 2025-08-10 RX ADMIN — FUROSEMIDE 40 MILLIGRAM(S): 10 INJECTION INTRAMUSCULAR; INTRAVENOUS at 13:05

## 2025-08-10 RX ADMIN — ACETYLCYSTEINE 3 MILLILITER(S): 200 INHALANT RESPIRATORY (INHALATION) at 06:20

## 2025-08-10 RX ADMIN — Medication 400 MILLIGRAM(S): at 21:43

## 2025-08-10 RX ADMIN — IPRATROPIUM BROMIDE AND ALBUTEROL SULFATE 3 MILLILITER(S): .5; 2.5 SOLUTION RESPIRATORY (INHALATION) at 17:22

## 2025-08-10 RX ADMIN — IPRATROPIUM BROMIDE AND ALBUTEROL SULFATE 3 MILLILITER(S): .5; 2.5 SOLUTION RESPIRATORY (INHALATION) at 11:20

## 2025-08-10 RX ADMIN — ACETYLCYSTEINE 3 MILLILITER(S): 200 INHALANT RESPIRATORY (INHALATION) at 23:00

## 2025-08-10 RX ADMIN — BUDESONIDE 0.5 MILLIGRAM(S): 0.25 SUSPENSION RESPIRATORY (INHALATION) at 17:22

## 2025-08-10 RX ADMIN — Medication 1 TABLET(S): at 11:19

## 2025-08-10 RX ADMIN — LIDOCAINE HYDROCHLORIDE 1 PATCH: 20 JELLY TOPICAL at 17:49

## 2025-08-10 RX ADMIN — Medication 500 MILLIGRAM(S): at 11:20

## 2025-08-10 RX ADMIN — IPRATROPIUM BROMIDE AND ALBUTEROL SULFATE 3 MILLILITER(S): .5; 2.5 SOLUTION RESPIRATORY (INHALATION) at 23:00

## 2025-08-10 RX ADMIN — FUROSEMIDE 40 MILLIGRAM(S): 10 INJECTION INTRAMUSCULAR; INTRAVENOUS at 08:02

## 2025-08-10 RX ADMIN — SACUBITRIL AND VALSARTAN 1 TABLET(S): 6; 6 PELLET ORAL at 08:02

## 2025-08-10 RX ADMIN — CARVEDILOL 3.12 MILLIGRAM(S): 3.12 TABLET, FILM COATED ORAL at 17:23

## 2025-08-10 RX ADMIN — CARVEDILOL 3.12 MILLIGRAM(S): 3.12 TABLET, FILM COATED ORAL at 08:02

## 2025-08-10 RX ADMIN — APIXABAN 2.5 MILLIGRAM(S): 5 TABLET, FILM COATED ORAL at 17:23

## 2025-08-10 RX ADMIN — SODIUM CHLORIDE 50 MILLILITER(S): 9 INJECTION, SOLUTION INTRAVENOUS at 08:25

## 2025-08-10 RX ADMIN — APIXABAN 2.5 MILLIGRAM(S): 5 TABLET, FILM COATED ORAL at 06:20

## 2025-08-10 RX ADMIN — Medication 1000 MILLIGRAM(S): at 22:20

## 2025-08-10 RX ADMIN — LIDOCAINE HYDROCHLORIDE 1 PATCH: 20 JELLY TOPICAL at 20:05

## 2025-08-10 RX ADMIN — SACUBITRIL AND VALSARTAN 1 TABLET(S): 6; 6 PELLET ORAL at 17:23

## 2025-08-10 RX ADMIN — CEFTRIAXONE 100 MILLIGRAM(S): 500 INJECTION, POWDER, FOR SOLUTION INTRAMUSCULAR; INTRAVENOUS at 17:27

## 2025-08-10 RX ADMIN — ACETYLCYSTEINE 3 MILLILITER(S): 200 INHALANT RESPIRATORY (INHALATION) at 17:23

## 2025-08-10 RX ADMIN — BUDESONIDE 0.5 MILLIGRAM(S): 0.25 SUSPENSION RESPIRATORY (INHALATION) at 06:20

## 2025-08-11 LAB
ANION GAP SERPL CALC-SCNC: 8 MMOL/L — SIGNIFICANT CHANGE UP (ref 5–17)
BASE EXCESS BLDA CALC-SCNC: 25.1 MMOL/L — HIGH (ref -2–3)
BUN SERPL-MCNC: 41 MG/DL — HIGH (ref 7–23)
CALCIUM SERPL-MCNC: 9.4 MG/DL — SIGNIFICANT CHANGE UP (ref 8.4–10.5)
CHLORIDE SERPL-SCNC: 93 MMOL/L — LOW (ref 96–108)
CO2 BLDA-SCNC: 55 MMOL/L — CRITICAL HIGH (ref 19–24)
CO2 SERPL-SCNC: 44 MMOL/L — HIGH (ref 22–31)
CREAT SERPL-MCNC: 1.05 MG/DL — SIGNIFICANT CHANGE UP (ref 0.5–1.3)
EGFR: 52 ML/MIN/1.73M2 — LOW
EGFR: 52 ML/MIN/1.73M2 — LOW
GAS PNL BLDA: SIGNIFICANT CHANGE UP
GLUCOSE SERPL-MCNC: 82 MG/DL — SIGNIFICANT CHANGE UP (ref 70–99)
HCO3 BLDA-SCNC: 53 MMOL/L — CRITICAL HIGH (ref 21–28)
HCT VFR BLD CALC: 34.8 % — SIGNIFICANT CHANGE UP (ref 34.5–45)
HGB BLD-MCNC: 9.9 G/DL — LOW (ref 11.5–15.5)
IMMATURE PLATELET FRACTION #: 3.4 K/UL — LOW (ref 4.7–11.1)
IMMATURE PLATELET FRACTION %: 4.7 % — SIGNIFICANT CHANGE UP (ref 1.6–4.9)
MAGNESIUM SERPL-MCNC: 2.4 MG/DL — SIGNIFICANT CHANGE UP (ref 1.6–2.6)
MCHC RBC-ENTMCNC: 26.4 PG — LOW (ref 27–34)
MCHC RBC-ENTMCNC: 28.4 G/DL — LOW (ref 32–36)
MCV RBC AUTO: 92.8 FL — SIGNIFICANT CHANGE UP (ref 80–100)
NRBC # BLD AUTO: 0 K/UL — SIGNIFICANT CHANGE UP (ref 0–0)
NRBC # FLD: 0 K/UL — SIGNIFICANT CHANGE UP (ref 0–0)
NRBC BLD AUTO-RTO: 0 /100 WBCS — SIGNIFICANT CHANGE UP (ref 0–0)
PCO2 BLDA: 70 MMHG — CRITICAL HIGH (ref 32–45)
PH BLDA: 7.49 — HIGH (ref 7.35–7.45)
PLATELET # BLD AUTO: 72 K/UL — LOW (ref 150–400)
PMV BLD: 11.5 FL — SIGNIFICANT CHANGE UP (ref 7–13)
PO2 BLDA: 69 MMHG — LOW (ref 83–108)
POTASSIUM SERPL-MCNC: 3.5 MMOL/L — SIGNIFICANT CHANGE UP (ref 3.5–5.3)
POTASSIUM SERPL-SCNC: 3.5 MMOL/L — SIGNIFICANT CHANGE UP (ref 3.5–5.3)
RBC # BLD: 3.75 M/UL — LOW (ref 3.8–5.2)
RBC # FLD: 15.2 % — HIGH (ref 10.3–14.5)
SAO2 % BLDA: 95.9 % — SIGNIFICANT CHANGE UP (ref 94–98)
SODIUM SERPL-SCNC: 145 MMOL/L — SIGNIFICANT CHANGE UP (ref 135–145)
WBC # BLD: 4.33 K/UL — SIGNIFICANT CHANGE UP (ref 3.8–10.5)
WBC # FLD AUTO: 4.33 K/UL — SIGNIFICANT CHANGE UP (ref 3.8–10.5)

## 2025-08-11 PROCEDURE — 94010 BREATHING CAPACITY TEST: CPT | Mod: 26

## 2025-08-11 RX ORDER — FUROSEMIDE 10 MG/ML
40 INJECTION INTRAMUSCULAR; INTRAVENOUS
Refills: 0 | Status: DISCONTINUED | OUTPATIENT
Start: 2025-08-11 | End: 2025-08-12

## 2025-08-11 RX ADMIN — BUDESONIDE 0.5 MILLIGRAM(S): 0.25 SUSPENSION RESPIRATORY (INHALATION) at 17:09

## 2025-08-11 RX ADMIN — ACETYLCYSTEINE 3 MILLILITER(S): 200 INHALANT RESPIRATORY (INHALATION) at 17:09

## 2025-08-11 RX ADMIN — IPRATROPIUM BROMIDE AND ALBUTEROL SULFATE 3 MILLILITER(S): .5; 2.5 SOLUTION RESPIRATORY (INHALATION) at 12:32

## 2025-08-11 RX ADMIN — LIDOCAINE HYDROCHLORIDE 1 PATCH: 20 JELLY TOPICAL at 06:50

## 2025-08-11 RX ADMIN — ACETYLCYSTEINE 3 MILLILITER(S): 200 INHALANT RESPIRATORY (INHALATION) at 05:19

## 2025-08-11 RX ADMIN — IPRATROPIUM BROMIDE AND ALBUTEROL SULFATE 3 MILLILITER(S): .5; 2.5 SOLUTION RESPIRATORY (INHALATION) at 05:19

## 2025-08-11 RX ADMIN — APIXABAN 2.5 MILLIGRAM(S): 5 TABLET, FILM COATED ORAL at 06:23

## 2025-08-11 RX ADMIN — ACETYLCYSTEINE 3 MILLILITER(S): 200 INHALANT RESPIRATORY (INHALATION) at 12:33

## 2025-08-11 RX ADMIN — LIDOCAINE HYDROCHLORIDE 1 PATCH: 20 JELLY TOPICAL at 12:33

## 2025-08-11 RX ADMIN — BUDESONIDE 0.5 MILLIGRAM(S): 0.25 SUSPENSION RESPIRATORY (INHALATION) at 05:18

## 2025-08-11 RX ADMIN — CEFTRIAXONE 100 MILLIGRAM(S): 500 INJECTION, POWDER, FOR SOLUTION INTRAMUSCULAR; INTRAVENOUS at 17:08

## 2025-08-11 RX ADMIN — IPRATROPIUM BROMIDE AND ALBUTEROL SULFATE 3 MILLILITER(S): .5; 2.5 SOLUTION RESPIRATORY (INHALATION) at 17:09

## 2025-08-11 RX ADMIN — FUROSEMIDE 40 MILLIGRAM(S): 10 INJECTION INTRAMUSCULAR; INTRAVENOUS at 06:22

## 2025-08-11 RX ADMIN — CARVEDILOL 3.12 MILLIGRAM(S): 3.12 TABLET, FILM COATED ORAL at 06:22

## 2025-08-11 RX ADMIN — LIDOCAINE HYDROCHLORIDE 1 PATCH: 20 JELLY TOPICAL at 20:04

## 2025-08-11 RX ADMIN — SACUBITRIL AND VALSARTAN 1 TABLET(S): 6; 6 PELLET ORAL at 06:22

## 2025-08-12 LAB
ANION GAP SERPL CALC-SCNC: 11 MMOL/L — SIGNIFICANT CHANGE UP (ref 5–17)
BUN SERPL-MCNC: 34 MG/DL — HIGH (ref 7–23)
CALCIUM SERPL-MCNC: 9.5 MG/DL — SIGNIFICANT CHANGE UP (ref 8.4–10.5)
CHLORIDE SERPL-SCNC: 94 MMOL/L — LOW (ref 96–108)
CO2 SERPL-SCNC: 41 MMOL/L — HIGH (ref 22–31)
CREAT SERPL-MCNC: 1 MG/DL — SIGNIFICANT CHANGE UP (ref 0.5–1.3)
EGFR: 56 ML/MIN/1.73M2 — LOW
EGFR: 56 ML/MIN/1.73M2 — LOW
GLUCOSE SERPL-MCNC: 67 MG/DL — LOW (ref 70–99)
HCT VFR BLD CALC: 35.7 % — SIGNIFICANT CHANGE UP (ref 34.5–45)
HGB BLD-MCNC: 10.5 G/DL — LOW (ref 11.5–15.5)
IMMATURE PLATELET FRACTION #: 3.3 K/UL — LOW (ref 4.7–11.1)
IMMATURE PLATELET FRACTION %: 4.4 % — SIGNIFICANT CHANGE UP (ref 1.6–4.9)
MAGNESIUM SERPL-MCNC: 2.4 MG/DL — SIGNIFICANT CHANGE UP (ref 1.6–2.6)
MCHC RBC-ENTMCNC: 27.4 PG — SIGNIFICANT CHANGE UP (ref 27–34)
MCHC RBC-ENTMCNC: 29.4 G/DL — LOW (ref 32–36)
MCV RBC AUTO: 93.2 FL — SIGNIFICANT CHANGE UP (ref 80–100)
NRBC # BLD AUTO: 0 K/UL — SIGNIFICANT CHANGE UP (ref 0–0)
NRBC # FLD: 0 K/UL — SIGNIFICANT CHANGE UP (ref 0–0)
NRBC BLD AUTO-RTO: 0 /100 WBCS — SIGNIFICANT CHANGE UP (ref 0–0)
PLATELET # BLD AUTO: 74 K/UL — LOW (ref 150–400)
PMV BLD: 10.2 FL — SIGNIFICANT CHANGE UP (ref 7–13)
POTASSIUM SERPL-MCNC: 3.4 MMOL/L — LOW (ref 3.5–5.3)
POTASSIUM SERPL-SCNC: 3.4 MMOL/L — LOW (ref 3.5–5.3)
RBC # BLD: 3.83 M/UL — SIGNIFICANT CHANGE UP (ref 3.8–5.2)
RBC # FLD: 15 % — HIGH (ref 10.3–14.5)
SODIUM SERPL-SCNC: 146 MMOL/L — HIGH (ref 135–145)
WBC # BLD: 3.69 K/UL — LOW (ref 3.8–10.5)
WBC # FLD AUTO: 3.69 K/UL — LOW (ref 3.8–10.5)

## 2025-08-12 RX ORDER — FUROSEMIDE 10 MG/ML
20 INJECTION INTRAMUSCULAR; INTRAVENOUS
Refills: 0 | Status: DISCONTINUED | OUTPATIENT
Start: 2025-08-12 | End: 2025-08-13

## 2025-08-12 RX ORDER — FUROSEMIDE 10 MG/ML
40 INJECTION INTRAMUSCULAR; INTRAVENOUS
Refills: 0 | Status: DISCONTINUED | OUTPATIENT
Start: 2025-08-12 | End: 2025-08-13

## 2025-08-12 RX ORDER — SPIRONOLACTONE 25 MG
25 TABLET ORAL DAILY
Refills: 0 | Status: DISCONTINUED | OUTPATIENT
Start: 2025-08-13 | End: 2025-08-13

## 2025-08-12 RX ORDER — FUROSEMIDE 10 MG/ML
40 INJECTION INTRAMUSCULAR; INTRAVENOUS
Refills: 0 | Status: DISCONTINUED | OUTPATIENT
Start: 2025-08-12 | End: 2025-08-12

## 2025-08-12 RX ADMIN — Medication 100 MILLIEQUIVALENT(S): at 11:31

## 2025-08-12 RX ADMIN — CEFTRIAXONE 100 MILLIGRAM(S): 500 INJECTION, POWDER, FOR SOLUTION INTRAMUSCULAR; INTRAVENOUS at 17:07

## 2025-08-12 RX ADMIN — IPRATROPIUM BROMIDE AND ALBUTEROL SULFATE 3 MILLILITER(S): .5; 2.5 SOLUTION RESPIRATORY (INHALATION) at 00:46

## 2025-08-12 RX ADMIN — ACETYLCYSTEINE 3 MILLILITER(S): 200 INHALANT RESPIRATORY (INHALATION) at 00:46

## 2025-08-12 RX ADMIN — APIXABAN 2.5 MILLIGRAM(S): 5 TABLET, FILM COATED ORAL at 17:08

## 2025-08-12 RX ADMIN — CARVEDILOL 3.12 MILLIGRAM(S): 3.12 TABLET, FILM COATED ORAL at 17:09

## 2025-08-12 RX ADMIN — SACUBITRIL AND VALSARTAN 1 TABLET(S): 6; 6 PELLET ORAL at 17:08

## 2025-08-12 RX ADMIN — BUDESONIDE 0.5 MILLIGRAM(S): 0.25 SUSPENSION RESPIRATORY (INHALATION) at 05:38

## 2025-08-12 RX ADMIN — LIDOCAINE HYDROCHLORIDE 1 PATCH: 20 JELLY TOPICAL at 19:53

## 2025-08-12 RX ADMIN — Medication 500 MILLIGRAM(S): at 12:31

## 2025-08-12 RX ADMIN — Medication 100 MILLIEQUIVALENT(S): at 09:38

## 2025-08-12 RX ADMIN — FUROSEMIDE 20 MILLIGRAM(S): 10 INJECTION INTRAMUSCULAR; INTRAVENOUS at 18:52

## 2025-08-12 RX ADMIN — LIDOCAINE HYDROCHLORIDE 1 PATCH: 20 JELLY TOPICAL at 02:38

## 2025-08-12 RX ADMIN — IPRATROPIUM BROMIDE AND ALBUTEROL SULFATE 3 MILLILITER(S): .5; 2.5 SOLUTION RESPIRATORY (INHALATION) at 17:09

## 2025-08-12 RX ADMIN — IPRATROPIUM BROMIDE AND ALBUTEROL SULFATE 3 MILLILITER(S): .5; 2.5 SOLUTION RESPIRATORY (INHALATION) at 12:30

## 2025-08-12 RX ADMIN — FUROSEMIDE 40 MILLIGRAM(S): 10 INJECTION INTRAMUSCULAR; INTRAVENOUS at 13:46

## 2025-08-12 RX ADMIN — ACETYLCYSTEINE 3 MILLILITER(S): 200 INHALANT RESPIRATORY (INHALATION) at 05:40

## 2025-08-12 RX ADMIN — IPRATROPIUM BROMIDE AND ALBUTEROL SULFATE 3 MILLILITER(S): .5; 2.5 SOLUTION RESPIRATORY (INHALATION) at 05:38

## 2025-08-12 RX ADMIN — BUDESONIDE 0.5 MILLIGRAM(S): 0.25 SUSPENSION RESPIRATORY (INHALATION) at 17:08

## 2025-08-12 RX ADMIN — Medication 1 TABLET(S): at 12:31

## 2025-08-12 RX ADMIN — LIDOCAINE HYDROCHLORIDE 1 PATCH: 20 JELLY TOPICAL at 12:31

## 2025-08-12 RX ADMIN — FUROSEMIDE 40 MILLIGRAM(S): 10 INJECTION INTRAMUSCULAR; INTRAVENOUS at 05:39

## 2025-08-12 RX ADMIN — Medication 100 MILLIEQUIVALENT(S): at 10:22

## 2025-08-13 LAB
ANION GAP SERPL CALC-SCNC: 8 MMOL/L — SIGNIFICANT CHANGE UP (ref 5–17)
BASE EXCESS BLDA CALC-SCNC: 22.5 MMOL/L — HIGH (ref -2–3)
BUN SERPL-MCNC: 35 MG/DL — HIGH (ref 7–23)
CALCIUM SERPL-MCNC: 9.5 MG/DL — SIGNIFICANT CHANGE UP (ref 8.4–10.5)
CHLORIDE SERPL-SCNC: 93 MMOL/L — LOW (ref 96–108)
CO2 BLDA-SCNC: 54 MMOL/L — CRITICAL HIGH (ref 19–24)
CO2 SERPL-SCNC: 43 MMOL/L — HIGH (ref 22–31)
CREAT SERPL-MCNC: 0.99 MG/DL — SIGNIFICANT CHANGE UP (ref 0.5–1.3)
EGFR: 56 ML/MIN/1.73M2 — LOW
EGFR: 56 ML/MIN/1.73M2 — LOW
GAS PNL BLDA: SIGNIFICANT CHANGE UP
GLUCOSE SERPL-MCNC: 79 MG/DL — SIGNIFICANT CHANGE UP (ref 70–99)
HCO3 BLDA-SCNC: 52 MMOL/L — CRITICAL HIGH (ref 21–28)
HCT VFR BLD CALC: 34.5 % — SIGNIFICANT CHANGE UP (ref 34.5–45)
HGB BLD-MCNC: 10 G/DL — LOW (ref 11.5–15.5)
HOROWITZ INDEX BLDA+IHG-RTO: 32 — SIGNIFICANT CHANGE UP
IMMATURE PLATELET FRACTION #: 3.5 K/UL — LOW (ref 4.7–11.1)
IMMATURE PLATELET FRACTION %: 4.9 % — SIGNIFICANT CHANGE UP (ref 1.6–4.9)
MAGNESIUM SERPL-MCNC: 2.2 MG/DL — SIGNIFICANT CHANGE UP (ref 1.6–2.6)
MCHC RBC-ENTMCNC: 26.7 PG — LOW (ref 27–34)
MCHC RBC-ENTMCNC: 29 G/DL — LOW (ref 32–36)
MCV RBC AUTO: 92.2 FL — SIGNIFICANT CHANGE UP (ref 80–100)
NRBC # BLD AUTO: 0 K/UL — SIGNIFICANT CHANGE UP (ref 0–0)
NRBC # FLD: 0 K/UL — SIGNIFICANT CHANGE UP (ref 0–0)
NRBC BLD AUTO-RTO: 0 /100 WBCS — SIGNIFICANT CHANGE UP (ref 0–0)
PCO2 BLDA: 78 MMHG — CRITICAL HIGH (ref 32–45)
PH BLDA: 7.43 — SIGNIFICANT CHANGE UP (ref 7.35–7.45)
PLATELET # BLD AUTO: 71 K/UL — LOW (ref 150–400)
PMV BLD: 11.2 FL — SIGNIFICANT CHANGE UP (ref 7–13)
PO2 BLDA: 67 MMHG — LOW (ref 83–108)
POTASSIUM SERPL-MCNC: 3.3 MMOL/L — LOW (ref 3.5–5.3)
POTASSIUM SERPL-SCNC: 3.3 MMOL/L — LOW (ref 3.5–5.3)
RBC # BLD: 3.74 M/UL — LOW (ref 3.8–5.2)
RBC # FLD: 14.8 % — HIGH (ref 10.3–14.5)
SAO2 % BLDA: 95 % — SIGNIFICANT CHANGE UP (ref 94–98)
SODIUM SERPL-SCNC: 144 MMOL/L — SIGNIFICANT CHANGE UP (ref 135–145)
WBC # BLD: 4.07 K/UL — SIGNIFICANT CHANGE UP (ref 3.8–10.5)
WBC # FLD AUTO: 4.07 K/UL — SIGNIFICANT CHANGE UP (ref 3.8–10.5)

## 2025-08-13 RX ORDER — ACETAZOLAMIDE 250 MG/1
250 TABLET ORAL EVERY 12 HOURS
Refills: 0 | Status: COMPLETED | OUTPATIENT
Start: 2025-08-13 | End: 2025-08-14

## 2025-08-13 RX ORDER — FUROSEMIDE 10 MG/ML
40 INJECTION INTRAMUSCULAR; INTRAVENOUS DAILY
Refills: 0 | Status: DISCONTINUED | OUTPATIENT
Start: 2025-08-14 | End: 2025-08-14

## 2025-08-13 RX ADMIN — SACUBITRIL AND VALSARTAN 1 TABLET(S): 6; 6 PELLET ORAL at 17:48

## 2025-08-13 RX ADMIN — IPRATROPIUM BROMIDE AND ALBUTEROL SULFATE 3 MILLILITER(S): .5; 2.5 SOLUTION RESPIRATORY (INHALATION) at 01:27

## 2025-08-13 RX ADMIN — Medication 1 TABLET(S): at 11:14

## 2025-08-13 RX ADMIN — BUDESONIDE 0.5 MILLIGRAM(S): 0.25 SUSPENSION RESPIRATORY (INHALATION) at 17:48

## 2025-08-13 RX ADMIN — Medication 500 MILLIGRAM(S): at 11:14

## 2025-08-13 RX ADMIN — IPRATROPIUM BROMIDE AND ALBUTEROL SULFATE 3 MILLILITER(S): .5; 2.5 SOLUTION RESPIRATORY (INHALATION) at 17:48

## 2025-08-13 RX ADMIN — Medication 25 MILLIGRAM(S): at 05:59

## 2025-08-13 RX ADMIN — LIDOCAINE HYDROCHLORIDE 1 PATCH: 20 JELLY TOPICAL at 00:00

## 2025-08-13 RX ADMIN — IPRATROPIUM BROMIDE AND ALBUTEROL SULFATE 3 MILLILITER(S): .5; 2.5 SOLUTION RESPIRATORY (INHALATION) at 07:33

## 2025-08-13 RX ADMIN — Medication 100 MILLIEQUIVALENT(S): at 09:21

## 2025-08-13 RX ADMIN — ACETAZOLAMIDE 250 MILLIGRAM(S): 250 TABLET ORAL at 17:48

## 2025-08-13 RX ADMIN — LIDOCAINE HYDROCHLORIDE 1 PATCH: 20 JELLY TOPICAL at 19:35

## 2025-08-13 RX ADMIN — LIDOCAINE HYDROCHLORIDE 1 PATCH: 20 JELLY TOPICAL at 11:13

## 2025-08-13 RX ADMIN — Medication 100 MILLIEQUIVALENT(S): at 10:16

## 2025-08-13 RX ADMIN — LIDOCAINE HYDROCHLORIDE 1 PATCH: 20 JELLY TOPICAL at 01:00

## 2025-08-13 RX ADMIN — CARVEDILOL 3.12 MILLIGRAM(S): 3.12 TABLET, FILM COATED ORAL at 05:52

## 2025-08-13 RX ADMIN — Medication 100 MILLIEQUIVALENT(S): at 11:13

## 2025-08-13 RX ADMIN — SACUBITRIL AND VALSARTAN 1 TABLET(S): 6; 6 PELLET ORAL at 05:52

## 2025-08-13 RX ADMIN — BUDESONIDE 0.5 MILLIGRAM(S): 0.25 SUSPENSION RESPIRATORY (INHALATION) at 05:53

## 2025-08-13 RX ADMIN — CARVEDILOL 3.12 MILLIGRAM(S): 3.12 TABLET, FILM COATED ORAL at 17:48

## 2025-08-13 RX ADMIN — APIXABAN 2.5 MILLIGRAM(S): 5 TABLET, FILM COATED ORAL at 05:53

## 2025-08-13 RX ADMIN — FUROSEMIDE 40 MILLIGRAM(S): 10 INJECTION INTRAMUSCULAR; INTRAVENOUS at 05:59

## 2025-08-13 RX ADMIN — IPRATROPIUM BROMIDE AND ALBUTEROL SULFATE 3 MILLILITER(S): .5; 2.5 SOLUTION RESPIRATORY (INHALATION) at 23:32

## 2025-08-13 RX ADMIN — IPRATROPIUM BROMIDE AND ALBUTEROL SULFATE 3 MILLILITER(S): .5; 2.5 SOLUTION RESPIRATORY (INHALATION) at 11:14

## 2025-08-13 RX ADMIN — APIXABAN 2.5 MILLIGRAM(S): 5 TABLET, FILM COATED ORAL at 17:47

## 2025-08-13 RX ADMIN — CEFTRIAXONE 100 MILLIGRAM(S): 500 INJECTION, POWDER, FOR SOLUTION INTRAMUSCULAR; INTRAVENOUS at 17:47

## 2025-08-14 ENCOUNTER — TRANSCRIPTION ENCOUNTER (OUTPATIENT)
Age: 85
End: 2025-08-14

## 2025-08-14 VITALS
RESPIRATION RATE: 18 BRPM | TEMPERATURE: 98 F | DIASTOLIC BLOOD PRESSURE: 63 MMHG | OXYGEN SATURATION: 90 % | SYSTOLIC BLOOD PRESSURE: 105 MMHG | HEART RATE: 51 BPM

## 2025-08-14 LAB
ANION GAP SERPL CALC-SCNC: 10 MMOL/L — SIGNIFICANT CHANGE UP (ref 5–17)
BUN SERPL-MCNC: 37 MG/DL — HIGH (ref 7–23)
CALCIUM SERPL-MCNC: 9.7 MG/DL — SIGNIFICANT CHANGE UP (ref 8.4–10.5)
CHLORIDE SERPL-SCNC: 95 MMOL/L — LOW (ref 96–108)
CO2 SERPL-SCNC: 39 MMOL/L — HIGH (ref 22–31)
CREAT SERPL-MCNC: 1.09 MG/DL — SIGNIFICANT CHANGE UP (ref 0.5–1.3)
EGFR: 50 ML/MIN/1.73M2 — LOW
EGFR: 50 ML/MIN/1.73M2 — LOW
GAS PNL BLDA: SIGNIFICANT CHANGE UP
GLUCOSE SERPL-MCNC: 76 MG/DL — SIGNIFICANT CHANGE UP (ref 70–99)
HCT VFR BLD CALC: 34.8 % — SIGNIFICANT CHANGE UP (ref 34.5–45)
HGB BLD-MCNC: 10.1 G/DL — LOW (ref 11.5–15.5)
IMMATURE PLATELET FRACTION #: 4.4 K/UL — LOW (ref 4.7–11.1)
IMMATURE PLATELET FRACTION %: 5.8 % — HIGH (ref 1.6–4.9)
MAGNESIUM SERPL-MCNC: 2.4 MG/DL — SIGNIFICANT CHANGE UP (ref 1.6–2.6)
MCHC RBC-ENTMCNC: 26.7 PG — LOW (ref 27–34)
MCHC RBC-ENTMCNC: 29 G/DL — LOW (ref 32–36)
MCV RBC AUTO: 92.1 FL — SIGNIFICANT CHANGE UP (ref 80–100)
NRBC # BLD AUTO: 0 K/UL — SIGNIFICANT CHANGE UP (ref 0–0)
NRBC # FLD: 0 K/UL — SIGNIFICANT CHANGE UP (ref 0–0)
NRBC BLD AUTO-RTO: 0 /100 WBCS — SIGNIFICANT CHANGE UP (ref 0–0)
PLATELET # BLD AUTO: 75 K/UL — LOW (ref 150–400)
PMV BLD: 11.1 FL — SIGNIFICANT CHANGE UP (ref 7–13)
POTASSIUM SERPL-MCNC: 3.5 MMOL/L — SIGNIFICANT CHANGE UP (ref 3.5–5.3)
POTASSIUM SERPL-SCNC: 3.5 MMOL/L — SIGNIFICANT CHANGE UP (ref 3.5–5.3)
RBC # BLD: 3.78 M/UL — LOW (ref 3.8–5.2)
RBC # FLD: 14.8 % — HIGH (ref 10.3–14.5)
SODIUM SERPL-SCNC: 144 MMOL/L — SIGNIFICANT CHANGE UP (ref 135–145)
WBC # BLD: 4.68 K/UL — SIGNIFICANT CHANGE UP (ref 3.8–10.5)
WBC # FLD AUTO: 4.68 K/UL — SIGNIFICANT CHANGE UP (ref 3.8–10.5)

## 2025-08-14 PROCEDURE — 85027 COMPLETE CBC AUTOMATED: CPT

## 2025-08-14 PROCEDURE — 80053 COMPREHEN METABOLIC PANEL: CPT

## 2025-08-14 PROCEDURE — 97161 PT EVAL LOW COMPLEX 20 MIN: CPT

## 2025-08-14 PROCEDURE — 36415 COLL VENOUS BLD VENIPUNCTURE: CPT

## 2025-08-14 PROCEDURE — 0225U NFCT DS DNA&RNA 21 SARSCOV2: CPT

## 2025-08-14 PROCEDURE — 82947 ASSAY GLUCOSE BLOOD QUANT: CPT

## 2025-08-14 PROCEDURE — 80061 LIPID PANEL: CPT

## 2025-08-14 PROCEDURE — 84132 ASSAY OF SERUM POTASSIUM: CPT

## 2025-08-14 PROCEDURE — 94660 CPAP INITIATION&MGMT: CPT

## 2025-08-14 PROCEDURE — 36600 WITHDRAWAL OF ARTERIAL BLOOD: CPT

## 2025-08-14 PROCEDURE — 87899 AGENT NOS ASSAY W/OPTIC: CPT

## 2025-08-14 PROCEDURE — 94010 BREATHING CAPACITY TEST: CPT

## 2025-08-14 PROCEDURE — 92610 EVALUATE SWALLOWING FUNCTION: CPT

## 2025-08-14 PROCEDURE — 71045 X-RAY EXAM CHEST 1 VIEW: CPT

## 2025-08-14 PROCEDURE — 84484 ASSAY OF TROPONIN QUANT: CPT

## 2025-08-14 PROCEDURE — 80048 BASIC METABOLIC PNL TOTAL CA: CPT

## 2025-08-14 PROCEDURE — 85025 COMPLETE CBC W/AUTO DIFF WBC: CPT

## 2025-08-14 PROCEDURE — 84100 ASSAY OF PHOSPHORUS: CPT

## 2025-08-14 PROCEDURE — 71250 CT THORAX DX C-: CPT

## 2025-08-14 PROCEDURE — 84295 ASSAY OF SERUM SODIUM: CPT

## 2025-08-14 PROCEDURE — 82962 GLUCOSE BLOOD TEST: CPT

## 2025-08-14 PROCEDURE — 82803 BLOOD GASES ANY COMBINATION: CPT

## 2025-08-14 PROCEDURE — 96374 THER/PROPH/DIAG INJ IV PUSH: CPT

## 2025-08-14 PROCEDURE — 83605 ASSAY OF LACTIC ACID: CPT

## 2025-08-14 PROCEDURE — C8929: CPT

## 2025-08-14 PROCEDURE — 94640 AIRWAY INHALATION TREATMENT: CPT

## 2025-08-14 PROCEDURE — 83880 ASSAY OF NATRIURETIC PEPTIDE: CPT

## 2025-08-14 PROCEDURE — 93005 ELECTROCARDIOGRAM TRACING: CPT

## 2025-08-14 PROCEDURE — 83735 ASSAY OF MAGNESIUM: CPT

## 2025-08-14 PROCEDURE — 85014 HEMATOCRIT: CPT

## 2025-08-14 PROCEDURE — 92612 ENDOSCOPY SWALLOW (FEES) VID: CPT

## 2025-08-14 PROCEDURE — 85018 HEMOGLOBIN: CPT

## 2025-08-14 PROCEDURE — 99285 EMERGENCY DEPT VISIT HI MDM: CPT | Mod: 25

## 2025-08-14 PROCEDURE — 87637 SARSCOV2&INF A&B&RSV AMP PRB: CPT

## 2025-08-14 PROCEDURE — 82330 ASSAY OF CALCIUM: CPT

## 2025-08-14 PROCEDURE — 82435 ASSAY OF BLOOD CHLORIDE: CPT

## 2025-08-14 RX ORDER — FUROSEMIDE 10 MG/ML
1 INJECTION INTRAMUSCULAR; INTRAVENOUS
Qty: 0 | Refills: 0 | DISCHARGE
Start: 2025-08-14

## 2025-08-14 RX ORDER — IPRATROPIUM BROMIDE AND ALBUTEROL SULFATE .5; 2.5 MG/3ML; MG/3ML
3 SOLUTION RESPIRATORY (INHALATION)
Qty: 0 | Refills: 0 | DISCHARGE
Start: 2025-08-14

## 2025-08-14 RX ORDER — MAGNESIUM HYDROXIDE 400 MG/5ML
30 SUSPENSION ORAL DAILY
Refills: 0 | Status: DISCONTINUED | OUTPATIENT
Start: 2025-08-14 | End: 2025-08-14

## 2025-08-14 RX ORDER — B1/B2/B3/B5/B6/B12/VIT C/FOLIC 500-0.5 MG
1 TABLET ORAL
Qty: 0 | Refills: 0 | DISCHARGE
Start: 2025-08-14

## 2025-08-14 RX ORDER — SOD PHOS DI, MONO/K PHOS MONO 250 MG
1 TABLET ORAL EVERY 4 HOURS
Refills: 0 | Status: COMPLETED | OUTPATIENT
Start: 2025-08-14 | End: 2025-08-14

## 2025-08-14 RX ORDER — LANOLIN/MINERAL OIL/PETROLATUM
1 OINTMENT (GRAM) OPHTHALMIC (EYE)
Qty: 0 | Refills: 0 | DISCHARGE
Start: 2025-08-14

## 2025-08-14 RX ORDER — BUDESONIDE 0.25 MG/2ML
2 SUSPENSION RESPIRATORY (INHALATION)
Qty: 60 | Refills: 0
Start: 2025-08-14 | End: 2025-09-12

## 2025-08-14 RX ORDER — FUROSEMIDE 10 MG/ML
3 INJECTION INTRAMUSCULAR; INTRAVENOUS
Qty: 0 | Refills: 0 | DISCHARGE

## 2025-08-14 RX ADMIN — FUROSEMIDE 40 MILLIGRAM(S): 10 INJECTION INTRAMUSCULAR; INTRAVENOUS at 05:41

## 2025-08-14 RX ADMIN — MAGNESIUM HYDROXIDE 30 MILLILITER(S): 400 SUSPENSION ORAL at 08:53

## 2025-08-14 RX ADMIN — Medication 1 TABLET(S): at 12:08

## 2025-08-14 RX ADMIN — ACETAZOLAMIDE 250 MILLIGRAM(S): 250 TABLET ORAL at 08:57

## 2025-08-14 RX ADMIN — Medication 1 PACKET(S): at 12:09

## 2025-08-14 RX ADMIN — IPRATROPIUM BROMIDE AND ALBUTEROL SULFATE 3 MILLILITER(S): .5; 2.5 SOLUTION RESPIRATORY (INHALATION) at 05:41

## 2025-08-14 RX ADMIN — CARVEDILOL 3.12 MILLIGRAM(S): 3.12 TABLET, FILM COATED ORAL at 06:55

## 2025-08-14 RX ADMIN — Medication 1 PACKET(S): at 06:55

## 2025-08-14 RX ADMIN — IPRATROPIUM BROMIDE AND ALBUTEROL SULFATE 3 MILLILITER(S): .5; 2.5 SOLUTION RESPIRATORY (INHALATION) at 12:08

## 2025-08-14 RX ADMIN — APIXABAN 2.5 MILLIGRAM(S): 5 TABLET, FILM COATED ORAL at 05:41

## 2025-08-14 RX ADMIN — BUDESONIDE 0.5 MILLIGRAM(S): 0.25 SUSPENSION RESPIRATORY (INHALATION) at 05:40

## 2025-08-14 RX ADMIN — SACUBITRIL AND VALSARTAN 1 TABLET(S): 6; 6 PELLET ORAL at 05:42

## 2025-08-14 RX ADMIN — Medication 500 MILLIGRAM(S): at 12:08

## 2025-08-14 RX ADMIN — LIDOCAINE HYDROCHLORIDE 1 PATCH: 20 JELLY TOPICAL at 12:08

## 2025-08-28 ENCOUNTER — TRANSCRIPTION ENCOUNTER (OUTPATIENT)
Age: 85
End: 2025-08-28

## 2025-08-28 ENCOUNTER — RX RENEWAL (OUTPATIENT)
Age: 85
End: 2025-08-28

## 2025-09-04 ENCOUNTER — RESULT REVIEW (OUTPATIENT)
Age: 85
End: 2025-09-04

## 2025-09-08 ENCOUNTER — TRANSCRIPTION ENCOUNTER (OUTPATIENT)
Age: 85
End: 2025-09-08

## 2025-09-11 ENCOUNTER — NON-APPOINTMENT (OUTPATIENT)
Age: 85
End: 2025-09-11

## 2025-09-12 ENCOUNTER — TRANSCRIPTION ENCOUNTER (OUTPATIENT)
Age: 85
End: 2025-09-12